# Patient Record
Sex: MALE | Race: AMERICAN INDIAN OR ALASKA NATIVE | NOT HISPANIC OR LATINO
[De-identification: names, ages, dates, MRNs, and addresses within clinical notes are randomized per-mention and may not be internally consistent; named-entity substitution may affect disease eponyms.]

---

## 2019-11-07 ENCOUNTER — TRANSCRIPTION ENCOUNTER (OUTPATIENT)
Age: 24
End: 2019-11-07

## 2020-01-07 ENCOUNTER — TRANSCRIPTION ENCOUNTER (OUTPATIENT)
Age: 25
End: 2020-01-07

## 2020-04-26 ENCOUNTER — MESSAGE (OUTPATIENT)
Age: 25
End: 2020-04-26

## 2020-05-03 ENCOUNTER — APPOINTMENT (OUTPATIENT)
Dept: DISASTER EMERGENCY | Facility: HOSPITAL | Age: 25
End: 2020-05-03

## 2020-05-04 LAB
SARS-COV-2 IGG SERPL IA-ACNC: 0.2 INDEX
SARS-COV-2 IGG SERPL QL IA: NEGATIVE

## 2020-08-24 ENCOUNTER — TRANSCRIPTION ENCOUNTER (OUTPATIENT)
Age: 25
End: 2020-08-24

## 2020-12-01 ENCOUNTER — TRANSCRIPTION ENCOUNTER (OUTPATIENT)
Age: 25
End: 2020-12-01

## 2021-07-09 ENCOUNTER — EMERGENCY (EMERGENCY)
Facility: HOSPITAL | Age: 26
LOS: 0 days | Discharge: HOME | End: 2021-07-09
Attending: EMERGENCY MEDICINE | Admitting: EMERGENCY MEDICINE
Payer: COMMERCIAL

## 2021-07-09 VITALS
WEIGHT: 186.07 LBS | HEART RATE: 82 BPM | DIASTOLIC BLOOD PRESSURE: 79 MMHG | SYSTOLIC BLOOD PRESSURE: 148 MMHG | HEIGHT: 71 IN | RESPIRATION RATE: 18 BRPM | TEMPERATURE: 99 F | OXYGEN SATURATION: 99 %

## 2021-07-09 DIAGNOSIS — R68.83 CHILLS (WITHOUT FEVER): ICD-10-CM

## 2021-07-09 DIAGNOSIS — R11.2 NAUSEA WITH VOMITING, UNSPECIFIED: ICD-10-CM

## 2021-07-09 DIAGNOSIS — R10.9 UNSPECIFIED ABDOMINAL PAIN: ICD-10-CM

## 2021-07-09 LAB
ALBUMIN SERPL ELPH-MCNC: 4.8 G/DL — SIGNIFICANT CHANGE UP (ref 3.5–5.2)
ALP SERPL-CCNC: 110 U/L — SIGNIFICANT CHANGE UP (ref 30–115)
ALT FLD-CCNC: 16 U/L — SIGNIFICANT CHANGE UP (ref 0–41)
ANION GAP SERPL CALC-SCNC: 11 MMOL/L — SIGNIFICANT CHANGE UP (ref 7–14)
APPEARANCE UR: CLEAR — SIGNIFICANT CHANGE UP
AST SERPL-CCNC: 23 U/L — SIGNIFICANT CHANGE UP (ref 0–41)
BASOPHILS # BLD AUTO: 0.06 K/UL — SIGNIFICANT CHANGE UP (ref 0–0.2)
BASOPHILS NFR BLD AUTO: 0.6 % — SIGNIFICANT CHANGE UP (ref 0–1)
BILIRUB SERPL-MCNC: 0.2 MG/DL — SIGNIFICANT CHANGE UP (ref 0.2–1.2)
BILIRUB UR-MCNC: NEGATIVE — SIGNIFICANT CHANGE UP
BUN SERPL-MCNC: 18 MG/DL — SIGNIFICANT CHANGE UP (ref 10–20)
CALCIUM SERPL-MCNC: 9.3 MG/DL — SIGNIFICANT CHANGE UP (ref 8.5–10.1)
CHLORIDE SERPL-SCNC: 102 MMOL/L — SIGNIFICANT CHANGE UP (ref 98–110)
CO2 SERPL-SCNC: 28 MMOL/L — SIGNIFICANT CHANGE UP (ref 17–32)
COLOR SPEC: COLORLESS — SIGNIFICANT CHANGE UP
CREAT SERPL-MCNC: 1.1 MG/DL — SIGNIFICANT CHANGE UP (ref 0.7–1.5)
DIFF PNL FLD: NEGATIVE — SIGNIFICANT CHANGE UP
EOSINOPHIL # BLD AUTO: 0.2 K/UL — SIGNIFICANT CHANGE UP (ref 0–0.7)
EOSINOPHIL NFR BLD AUTO: 1.9 % — SIGNIFICANT CHANGE UP (ref 0–8)
GLUCOSE SERPL-MCNC: 93 MG/DL — SIGNIFICANT CHANGE UP (ref 70–99)
GLUCOSE UR QL: NEGATIVE — SIGNIFICANT CHANGE UP
HCT VFR BLD CALC: 45.7 % — SIGNIFICANT CHANGE UP (ref 42–52)
HGB BLD-MCNC: 14.8 G/DL — SIGNIFICANT CHANGE UP (ref 14–18)
IMM GRANULOCYTES NFR BLD AUTO: 0.3 % — SIGNIFICANT CHANGE UP (ref 0.1–0.3)
KETONES UR-MCNC: NEGATIVE — SIGNIFICANT CHANGE UP
LACTATE SERPL-SCNC: 1.4 MMOL/L — SIGNIFICANT CHANGE UP (ref 0.7–2)
LEUKOCYTE ESTERASE UR-ACNC: NEGATIVE — SIGNIFICANT CHANGE UP
LIDOCAIN IGE QN: 39 U/L — SIGNIFICANT CHANGE UP (ref 7–60)
LYMPHOCYTES # BLD AUTO: 2.46 K/UL — SIGNIFICANT CHANGE UP (ref 1.2–3.4)
LYMPHOCYTES # BLD AUTO: 23.6 % — SIGNIFICANT CHANGE UP (ref 20.5–51.1)
MCHC RBC-ENTMCNC: 28.2 PG — SIGNIFICANT CHANGE UP (ref 27–31)
MCHC RBC-ENTMCNC: 32.4 G/DL — SIGNIFICANT CHANGE UP (ref 32–37)
MCV RBC AUTO: 87 FL — SIGNIFICANT CHANGE UP (ref 80–94)
MONOCYTES # BLD AUTO: 0.86 K/UL — HIGH (ref 0.1–0.6)
MONOCYTES NFR BLD AUTO: 8.2 % — SIGNIFICANT CHANGE UP (ref 1.7–9.3)
NEUTROPHILS # BLD AUTO: 6.83 K/UL — HIGH (ref 1.4–6.5)
NEUTROPHILS NFR BLD AUTO: 65.4 % — SIGNIFICANT CHANGE UP (ref 42.2–75.2)
NITRITE UR-MCNC: NEGATIVE — SIGNIFICANT CHANGE UP
NRBC # BLD: 0 /100 WBCS — SIGNIFICANT CHANGE UP (ref 0–0)
PH UR: 6.5 — SIGNIFICANT CHANGE UP (ref 5–8)
PLATELET # BLD AUTO: 248 K/UL — SIGNIFICANT CHANGE UP (ref 130–400)
POTASSIUM SERPL-MCNC: 4.4 MMOL/L — SIGNIFICANT CHANGE UP (ref 3.5–5)
POTASSIUM SERPL-SCNC: 4.4 MMOL/L — SIGNIFICANT CHANGE UP (ref 3.5–5)
PROT SERPL-MCNC: 7 G/DL — SIGNIFICANT CHANGE UP (ref 6–8)
PROT UR-MCNC: NEGATIVE — SIGNIFICANT CHANGE UP
RBC # BLD: 5.25 M/UL — SIGNIFICANT CHANGE UP (ref 4.7–6.1)
RBC # FLD: 12.7 % — SIGNIFICANT CHANGE UP (ref 11.5–14.5)
SODIUM SERPL-SCNC: 141 MMOL/L — SIGNIFICANT CHANGE UP (ref 135–146)
SP GR SPEC: 1 — LOW (ref 1.01–1.03)
UROBILINOGEN FLD QL: SIGNIFICANT CHANGE UP
WBC # BLD: 10.44 K/UL — SIGNIFICANT CHANGE UP (ref 4.8–10.8)
WBC # FLD AUTO: 10.44 K/UL — SIGNIFICANT CHANGE UP (ref 4.8–10.8)

## 2021-07-09 PROCEDURE — 99285 EMERGENCY DEPT VISIT HI MDM: CPT

## 2021-07-09 PROCEDURE — 74177 CT ABD & PELVIS W/CONTRAST: CPT | Mod: 26,MA

## 2021-07-09 RX ORDER — SODIUM CHLORIDE 9 MG/ML
2600 INJECTION, SOLUTION INTRAVENOUS ONCE
Refills: 0 | Status: COMPLETED | OUTPATIENT
Start: 2021-07-09 | End: 2021-07-09

## 2021-07-09 RX ADMIN — SODIUM CHLORIDE 2600 MILLILITER(S): 9 INJECTION, SOLUTION INTRAVENOUS at 03:37

## 2021-07-09 NOTE — ED PROVIDER NOTE - CARE PROVIDER_API CALL
Kamille Breen)  Urology  [address] [place] [address] [address] [place] [id number]  Phone: [phone]  Fax: [phone]  Established Patient  Follow Up Time: 1-3 Days

## 2021-07-09 NOTE — ED PROVIDER NOTE - PROGRESS NOTE DETAILS
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

## 2021-07-09 NOTE — ED PROVIDER NOTE - OBJECTIVE STATEMENT
25 yold male to Ed with no signif medical hx presents to Ed with Left flank pain described as dull constant started initially 1 week ago worse x 3 days; tonight pt with n/v x2, chills/cold sweats; pt denies urinary sx - freq, urgency, dysuria, stranguria, hematuria; pt denies prior hx of kidney stones but father had uric acid stones started in 20s; pt admits to using creatine for workouts;

## 2021-07-09 NOTE — ED PROVIDER NOTE - PATIENT PORTAL LINK FT
You can access the FollowMyHealth Patient Portal offered by [facility] by registering at the following website: [email or web address]. By joining [employer]’s FollowMyHealth portal, you will also be able to view your health information using other applications (apps) compatible with our system.

## 2021-07-09 NOTE — ED ADULT NURSE NOTE - NSIMPLEMENTINTERV_GEN_ALL_ED
Implemented All Universal Safety Interventions:  [place] to call system. Call bell, personal items and telephone within reach. Instruct patient to call for assistance. Room bathroom lighting operational. Non-slip footwear when patient is off stretcher. Physically safe environment: no spills, clutter or unnecessary equipment. Stretcher in lowest position, wheels locked, appropriate side rails in place.

## 2021-07-10 LAB
CULTURE RESULTS: NO GROWTH — SIGNIFICANT CHANGE UP
SPECIMEN SOURCE: SIGNIFICANT CHANGE UP

## 2021-07-27 ENCOUNTER — APPOINTMENT (OUTPATIENT)
Dept: HEMATOLOGY ONCOLOGY | Facility: CLINIC | Age: 26
End: 2021-07-27
Payer: COMMERCIAL

## 2021-07-27 ENCOUNTER — RESULT REVIEW (OUTPATIENT)
Age: 26
End: 2021-07-27

## 2021-07-27 ENCOUNTER — APPOINTMENT (OUTPATIENT)
Dept: PULMONOLOGY | Facility: CLINIC | Age: 26
End: 2021-07-27
Payer: COMMERCIAL

## 2021-07-27 VITALS
OXYGEN SATURATION: 99 % | HEART RATE: 84 BPM | BODY MASS INDEX: 27.86 KG/M2 | RESPIRATION RATE: 18 BRPM | TEMPERATURE: 98.7 F | HEIGHT: 70 IN | WEIGHT: 194.6 LBS | DIASTOLIC BLOOD PRESSURE: 80 MMHG | SYSTOLIC BLOOD PRESSURE: 124 MMHG

## 2021-07-27 DIAGNOSIS — Z00.00 ENCOUNTER FOR GENERAL ADULT MEDICAL EXAMINATION W/OUT ABNORMAL FINDINGS: ICD-10-CM

## 2021-07-27 DIAGNOSIS — R93.5 ABNORMAL FINDINGS ON DIAGNOSTIC IMAGING OF OTHER ABDOMINAL REGIONS, INCLUDING RETROPERITONEUM: ICD-10-CM

## 2021-07-27 DIAGNOSIS — Z72.89 OTHER PROBLEMS RELATED TO LIFESTYLE: ICD-10-CM

## 2021-07-27 PROCEDURE — 94060 EVALUATION OF WHEEZING: CPT

## 2021-07-27 PROCEDURE — 99072 ADDL SUPL MATRL&STAF TM PHE: CPT

## 2021-07-27 PROCEDURE — 99205 OFFICE O/P NEW HI 60 MIN: CPT

## 2021-07-27 PROCEDURE — 94727 GAS DIL/WSHOT DETER LNG VOL: CPT

## 2021-07-27 PROCEDURE — 94729 DIFFUSING CAPACITY: CPT

## 2021-07-27 NOTE — HISTORY OF PRESENT ILLNESS
[de-identified] : Mr. 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 Borrego), no adjuvant therapy given. He was non-compliant with surveillance. He presents to clinic today with large periaortic lymphadenopathy concerning for seminoma recurrent seminoma (3 years out). Here  today to discuss about further management. Reportedly BHCG, AFP and LDH nl [place] [facility] [place] \par \par ONC Hx:\par Mr. Noyola was diagnosed with Stage IB (pT2pNx) Seminoma in 2018 and underwent Left inguinal radical orchiectomy with Dr. Borrego on 7/6/2018 at [facility]. He presented to the ED at [facility] on 7/9/21 with Left flank pain x 1 week associated with nausea and vomiting. He had CT AP performed on 7/9/21, results showing two enlarged left periaortic soft tissue lymph nodes measuring up to 4.1 x 2.9 x 2.8 cm surrounding the proximal ROSSANA. Findings concerning for metastatic lymphadenopathy.\par \par Had cryptorchid testes, never a smoking. \par \par 6/28/2018 MRI Visceral Pelvis\par Enlargement and replacement of the left testis by an enhancing mass 5.8x4.0x3.2cm, consistent with a testicular malignancy.  \par \par 7/3/2018 CT AP at [facility]\par 1.Splenic hypodensity. Etiology uncertain. \par 2.There are 2 midly enlarged upper mesenteric lymph nodes. Significance uncertain.\par 3.Evidence of chronic pyelonephritis left kidney. Right renal cyst.\par 4.Enlarged and midly fatty liver\par 5.No significant adenopathy in the para-aortic region particularly in the region of the renal lolita. No inguinla lymph nodes\par \par 7/6/2018 Left inguinal radical orchiectomy. meidum size Coloplast 16ml testicular implant placement and plastic closure of inguinial incision with Tenzin Escobar at [facility]\par Pathology report\par -Left testicle and spermatic cord: Seminoma, 6.5cm\par -Specimen laterality : Left\par -Tumor focality:Multifocal\par -Tumor size\par Greatest dimension of main tumor mass : [id number]\par Greatest dimensions of additional tumor nodules: 1.1cm & 1cm\par -Intratubular Germ Cell Neoplasia : Intratubular seminoma \par -Seminoma :Seminoma \par -Timor extension\par Tumor invades rete testis\par Tumor invades into tunica albuginea very close to tunica vaginalis, involvement of vaginalis cannot be excluded\par -Spermatic cord margin : Uninvolved by tumor\par -Lymphovascular invasion : Present\par -Regional Lymph nodes : no lymph nodes submitted or found\par -Pathologic stage : pT2pNx\par \par 7/9/21 CT AP at [facility]\par Two enlarged left periaortic soft tissue lymph nodes measuring up to 4.1 x 2.9 x 2.8 cm surrounding the proximal ROSSANA. Findings concerning for metastatic lymphadenopathy.\par Nonspecific 1.6 cm splenic hypodensity.\par Left renal mid pole 3.5 cm cyst contains enhancing septations. Consider further evaluation with MRI as clinically warranted. [de-identified] : No nausea/vomiting, flank pain, nausea/vomiting, hematuria., fever, chills, night sweats, shortness of breath, cough, headache/dizziness/vision changes. Had been actively losing weight unintentionally; 21 lbs (205 --> 186 lb).

## 2021-07-27 NOTE — ASSESSMENT
[FreeTextEntry1] : Mr. 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 Borrego), no adjuvant therapy given. He was non-compliant with surveillance. He presents to clinic today with large periaortic lymphadenopathy concerning for seminoma recurrent seminoma (3 years out). Here  today to discuss about further management. Reportedly BHCG, AFP and LDH nl (obtain reports from [facility] [place]). \par \par 6/28/2018 MRI Visceral Pelvis\par Enlargement and replacement of the left testis by an enhancing mass 5.8x4.0x3.2cm, consistent with a testicular malignancy.  \par \par 7/3/2018 CT AP at [facility]\par 1.Splenic hypodensity. Etiology uncertain. \par 2.There are 2 midly enlarged upper mesenteric lymph nodes. Significance uncertain.\par 3.Evidence of chronic pyelonephritis left kidney. Right renal cyst.\par 4.Enlarged and midly fatty liver\par 5.No significant adenopathy in the para-aortic region particularly in the region of the renal lolita. No inguinla lymph nodes\par \par 7/6/2018 Left inguinal radical orchiectomy. meidum size Coloplast 16ml testicular implant placement and plastic closure of inguinal incision with Tenzin Escobar at [facility]\par Pathology report\par -Left testicle and spermatic cord: Seminoma, 6.5cm\par -Specimen laterality : Left\par -Tumor focality:Multifocal\par -Tumor size\par Greatest dimension of main tumor mass : 6.0h6e1js\par Greatest dimensions of additional tumor nodules: 1.1cm & 1cm\par -Intratubular Germ Cell Neoplasia : Intratubular seminoma \par -Seminoma :Seminoma \par -Timor extension\par Tumor invades rete testis\par Tumor invades into tunica albuginea very close to tunica vaginalis, involvement of vaginalis cannot be excluded\par -Spermatic cord margin : Uninvolved by tumor\par -Lymphovascular invasion : Present\par -Regional Lymph nodes : no lymph nodes submitted or found\par -Pathologic stage : pT2pNx\par \par 7/9/21 CT AP at [facility]\par Two enlarged left periaortic soft tissue lymph nodes measuring up to 4.1 x 2.9 x 2.8 cm surrounding the proximal ROSSANA. Findings concerning for metastatic lymphadenopathy.\par Nonspecific 1.6 cm splenic hypodensity.\par Left renal mid pole 3.5 cm cyst contains enhancing septations. Consider further evaluation with MRI as clinically warranted.\par \par #) Suspected recurrent seminoma/retroperitoneal LN/abnl scans\par - Was diagnosed in 07/2018, no adjuvant therapy given, was not compliant with active surveillance. \par - CT A/P (07/09): Two enlarged left periaortic lymph nodes. Larger measures 4.1 x 2.9 x 2.8 cm. Pattern of spread is very consistent with recurrent seminoma. \par - Reportedly LDH, AFP, and Beta-HCG results from [facility] (07/19) were WNL. ctDNA and FoundationOne Liquid Bx to be done today. With repeat LDH, AFP and B-HCG. \par - CT Chest with contrast, PET scan \par - PFT testing in preparation for bleomycin therapy. \par - Will need to go to sperm bank for fertility preservation \par -Patient to go to [facility] [place] for a second opinion due to rarity of disease and toxicity of chemotherapy treatment\par -will risk stratify once w/u complete\par - COVID vaccine completed x 2 (Pfizer), will send COVID antibody \par - Had Urology appointment who said he was not a surgical candidate (Dr. Schoenfeld from [facility])\par \par Follow-up in 2 weeks\par

## 2021-07-28 LAB
AFP-TM SERPL-MCNC: <1.8 NG/ML
ALBUMIN SERPL ELPH-MCNC: 4.8 G/DL
ALP BLD-CCNC: 103 U/L
ALT SERPL-CCNC: 14 U/L
ANION GAP SERPL CALC-SCNC: 14 MMOL/L
APTT BLD: 34.1 SEC
AST SERPL-CCNC: 22 U/L
BASOPHILS # BLD AUTO: 0.04 K/UL
BASOPHILS NFR BLD AUTO: 0.5 %
BILIRUB SERPL-MCNC: 0.3 MG/DL
BUN SERPL-MCNC: 14 MG/DL
CALCIUM SERPL-MCNC: 9.9 MG/DL
CHLORIDE SERPL-SCNC: 101 MMOL/L
CO2 SERPL-SCNC: 25 MMOL/L
COVID-19 SPIKE DOMAIN ANTIBODY INTERPRETATION: POSITIVE
CREAT SERPL-MCNC: 1.02 MG/DL
EOSINOPHIL # BLD AUTO: 0.08 K/UL
EOSINOPHIL NFR BLD AUTO: 1.1 %
GLUCOSE SERPL-MCNC: 67 MG/DL
HCG SERPL-MCNC: 1 MIU/ML
HCT VFR BLD CALC: 49 %
HGB BLD-MCNC: 16.1 G/DL
IMM GRANULOCYTES NFR BLD AUTO: 0.4 %
INR PPP: 0.97 RATIO
LDH SERPL-CCNC: 327 U/L
LYMPHOCYTES # BLD AUTO: 1.39 K/UL
LYMPHOCYTES NFR BLD AUTO: 18.8 %
MAN DIFF?: NORMAL
MCHC RBC-ENTMCNC: 29.2 PG
MCHC RBC-ENTMCNC: 32.9 GM/DL
MCV RBC AUTO: 88.9 FL
MONOCYTES # BLD AUTO: 0.65 K/UL
MONOCYTES NFR BLD AUTO: 8.8 %
NEUTROPHILS # BLD AUTO: 5.22 K/UL
NEUTROPHILS NFR BLD AUTO: 70.4 %
PLATELET # BLD AUTO: 240 K/UL
POTASSIUM SERPL-SCNC: 4.4 MMOL/L
PROT SERPL-MCNC: 7.8 G/DL
PT BLD: 11.5 SEC
RBC # BLD: 5.51 M/UL
RBC # FLD: 13 %
SARS-COV-2 AB SERPL IA-ACNC: >250 U/ML
SODIUM SERPL-SCNC: 139 MMOL/L
WBC # FLD AUTO: 7.41 K/UL

## 2021-07-30 ENCOUNTER — RESULT REVIEW (OUTPATIENT)
Age: 26
End: 2021-07-30

## 2021-07-30 ENCOUNTER — OUTPATIENT (OUTPATIENT)
Dept: OUTPATIENT SERVICES | Facility: HOSPITAL | Age: 26
LOS: 1 days | End: 2021-07-30
Payer: COMMERCIAL

## 2021-07-30 ENCOUNTER — TRANSCRIPTION ENCOUNTER (OUTPATIENT)
Age: 26
End: 2021-07-30

## 2021-07-30 ENCOUNTER — APPOINTMENT (OUTPATIENT)
Dept: CT IMAGING | Facility: HOSPITAL | Age: 26
End: 2021-07-30
Payer: COMMERCIAL

## 2021-07-30 LAB
GLUCOSE BLDC GLUCOMTR-MCNC: 94 MG/DL — SIGNIFICANT CHANGE UP (ref 70–99)
TESTOST BND SERPL-MCNC: 19 PG/ML
TESTOSTERONE TOTAL S: 615 NG/DL

## 2021-07-30 PROCEDURE — 71260 CT THORAX DX C+: CPT

## 2021-07-30 PROCEDURE — 78815 PET IMAGE W/CT SKULL-THIGH: CPT

## 2021-07-30 PROCEDURE — 71260 CT THORAX DX C+: CPT | Mod: 26

## 2021-07-30 PROCEDURE — 78815 PET IMAGE W/CT SKULL-THIGH: CPT | Mod: 26

## 2021-07-30 PROCEDURE — A9552: CPT

## 2021-07-30 PROCEDURE — 82962 GLUCOSE BLOOD TEST: CPT

## 2021-08-06 ENCOUNTER — APPOINTMENT (OUTPATIENT)
Dept: HEMATOLOGY ONCOLOGY | Facility: CLINIC | Age: 26
End: 2021-08-06

## 2021-08-07 ENCOUNTER — LABORATORY RESULT (OUTPATIENT)
Age: 26
End: 2021-08-07

## 2021-08-10 ENCOUNTER — APPOINTMENT (OUTPATIENT)
Dept: HEMATOLOGY ONCOLOGY | Facility: CLINIC | Age: 26
End: 2021-08-10
Payer: COMMERCIAL

## 2021-08-10 VITALS
BODY MASS INDEX: 27.77 KG/M2 | SYSTOLIC BLOOD PRESSURE: 115 MMHG | RESPIRATION RATE: 18 BRPM | DIASTOLIC BLOOD PRESSURE: 62 MMHG | HEIGHT: 70 IN | OXYGEN SATURATION: 98 % | WEIGHT: 194 LBS | TEMPERATURE: 98.7 F | HEART RATE: 70 BPM

## 2021-08-10 PROCEDURE — 99214 OFFICE O/P EST MOD 30 MIN: CPT

## 2021-08-11 ENCOUNTER — OUTPATIENT (OUTPATIENT)
Dept: OUTPATIENT SERVICES | Facility: HOSPITAL | Age: 26
LOS: 1 days | Discharge: HOME | End: 2021-08-11

## 2021-08-11 DIAGNOSIS — C62.90 MALIGNANT NEOPLASM OF UNSPECIFIED TESTIS, UNSPECIFIED WHETHER DESCENDED OR UNDESCENDED: ICD-10-CM

## 2021-08-11 NOTE — ASSESSMENT
[FreeTextEntry1] : Mr. 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 Borrego), no adjuvant therapy given. He was non-compliant with surveillance. He presents to clinic today with large periaortic lymphadenopathy concerning for seminoma recurrent seminoma (3 years out). Here  today to discuss about further management. \par \par 6/28/2018 MRI Visceral Pelvis\par Enlargement and replacement of the left testis by an enhancing mass 5.8x4.0x3.2cm, consistent with a testicular malignancy.  \par \par 7/3/2018 CT AP at [facility]\par 1.Splenic hypodensity. Etiology uncertain. \par 2.There are 2 midly enlarged upper mesenteric lymph nodes. Significance uncertain.\par 3.Evidence of chronic pyelonephritis left kidney. Right renal cyst.\par 4.Enlarged and midly fatty liver\par 5.No significant adenopathy in the para-aortic region particularly in the region of the renal lolita. No inguinla lymph nodes\par \par 7/6/2018 Left inguinal radical orchiectomy. meidum size Coloplast 16ml testicular implant placement and plastic closure of inguinal incision with Tenzin Escobar at [facility]\par Pathology report\par -Left testicle and spermatic cord: Seminoma, 6.5cm\par -Specimen laterality : Left\par -Tumor focality:Multifocal\par -Tumor size\par Greatest dimension of main tumor mass : 6.3o2p6db\par Greatest dimensions of additional tumor nodules: 1.1cm & 1cm\par -Intratubular Germ Cell Neoplasia : Intratubular seminoma \par -Seminoma :Seminoma \par -Timor extension\par Tumor invades rete testis\par Tumor invades into tunica albuginea very close to tunica vaginalis, involvement of vaginalis cannot be excluded\par -Spermatic cord margin : Uninvolved by tumor\par -Lymphovascular invasion : Present\par -Regional Lymph nodes : no lymph nodes submitted or found\par -Pathologic stage : pT2pNx\par \par 7/9/21 CT AP at [facility]\par Two enlarged left periaortic soft tissue lymph nodes measuring up to 4.1 x 2.9 x 2.8 cm surrounding the proximal ROSSANA. Findings concerning for metastatic lymphadenopathy.\par Nonspecific 1.6 cm splenic hypodensity.\par Left renal mid pole 3.5 cm cyst contains enhancing septations. Consider further evaluation with MRI as clinically warranted.\par \par 7/30/2021 CT CHEST:\par 1.  No thoracic adenopathy or metastases.\par 2.  Unchanged splenic hypodensity, not FDG avid on concurrent PET/CT, indeterminate.\par 3.  Incidental probable few left renal calyceal diverticuli. Urogram could be helpful for confirmation.\par \par 7/30/2021 PET/CT:\par - Two enlarged FDG avid para-aortic lymph nodes are suspicious for shane metastasis.\par - No additional sites of pathologic FDG uptake to suggest biologic tumor activity.\par \par #) Suspected recurrent seminoma/retroperitoneal LN/abnl scans\par - Was diagnosed in 07/2018, no adjuvant therapy given, was not compliant with active surveillance. \par - CT A/P (07/09/21): Two enlarged left periaortic lymph nodes. Larger measures 4.1 x 2.9 x 2.8 cm. Pattern of spread is very consistent with recurrent seminoma. These LNs are PET-avid. \par -ctDNA pending since 7/27/21 and will need to resend FoundationOne Liquid Bx d/t insufficient cell free DNA.  With LDH elevated at 372, AFP -ve, lab performed wrong HCG (will repeat HCG-TM). \par - CT Chest with contrast-no LN, PET scan as above\par - PFT testing in preparation for bleomycin therapy (done, nl 7/27/21). \par - Had consult with sperm bank for fertility preservation \par -Patient to seek a second opinion due to rarity of disease and toxicity of chemotherapy treatment\par -will risk stratify as w/u complete, good risk (no organ involvement and LDH is < 2.5 times ULN)\par - COVID vaccine completed x 2 (Pfizer)\par - Had Urology appointment who said he was not a surgical candidate (Dr. Schoenfeld from [facility] [place])\par -FMLA and disability forms to be filled\par -Will give 3 cycles of BEP for recurrent seminoma (await liq bx results)\par -Bleomycin, etoposide, and cisplatin — For men with good-risk GCTs, suggest three cycles of standard BEP (bleomycin 30 units on days 1, 8, and 15; etoposide 100 mg/m2 on days 1 to 5; cisplatin 20 mg/m2 on days 1 to 5) administered on a 21-day cycle. This regimen of BEP is associated with an excellent survival outcome with limited toxicity\par -He was provided with patient educational materials. Risks, benefits and alternatives were discussed. He was also provided with the opportunity to asks questions. \par -Requests starting after labor day, will need a portacath\par -Liq biopsy inconclusive, will schedule a CT guided bx of LN\par Follow-up 9/7/21\par

## 2021-08-11 NOTE — HISTORY OF PRESENT ILLNESS
[de-identified] : Mr. 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 Borrego), no adjuvant therapy given. He was non-compliant with surveillance. He presents to clinic today with large periaortic lymphadenopathy concerning for seminoma recurrent seminoma (3 years out). Here  today to discuss about further management.\par \par ONC Hx:\par Mr. Noyola was diagnosed with Stage IB (pT2pNx) Seminoma in 2018 and underwent Left inguinal radical orchiectomy with Dr. Borrego on 7/6/2018 at [facility]. He presented to the ED at [facility] on 7/9/21 with Left flank pain x 1 week associated with nausea and vomiting. He had CT AP performed on 7/9/21, results showing two enlarged left periaortic soft tissue lymph nodes measuring up to 4.1 x 2.9 x 2.8 cm surrounding the proximal ROSSANA. Findings concerning for metastatic lymphadenopathy.\par \par Had cryptorchid testes, never a smoking. \par \par 6/28/2018 MRI Visceral Pelvis\par Enlargement and replacement of the left testis by an enhancing mass 5.8x4.0x3.2cm, consistent with a testicular malignancy.  \par \par 7/3/2018 CT AP at [facility]\par 1.Splenic hypodensity. Etiology uncertain. \par 2.There are 2 midly enlarged upper mesenteric lymph nodes. Significance uncertain.\par 3.Evidence of chronic pyelonephritis left kidney. Right renal cyst.\par 4.Enlarged and midly fatty liver\par 5.No significant adenopathy in the para-aortic region particularly in the region of the renal lolita. No inguinla lymph nodes\par \par 7/6/2018 Left inguinal radical orchiectomy. meidum size Coloplast 16ml testicular implant placement and plastic closure of inguinial incision with Tenzin Escobar at [facility]\par Pathology report\par -Left testicle and spermatic cord: Seminoma, 6.5cm\par -Specimen laterality : Left\par -Tumor focality:Multifocal\par -Tumor size\par Greatest dimension of main tumor mass : 6.7g7u5yd\par Greatest dimensions of additional tumor nodules: 1.1cm & 1cm\par -Intratubular Germ Cell Neoplasia : Intratubular seminoma \par -Seminoma :Seminoma \par -Timor extension\par Tumor invades rete testis\par Tumor invades into tunica albuginea very close to tunica vaginalis, involvement of vaginalis cannot be excluded\par -Spermatic cord margin : Uninvolved by tumor\par -Lymphovascular invasion : Present\par -Regional Lymph nodes : no lymph nodes submitted or found\par -Pathologic stage : pT2pNx\par \par 7/9/21 CT AP at [facility]\par Two enlarged left periaortic soft tissue lymph nodes measuring up to 4.1 x 2.9 x 2.8 cm surrounding the proximal ROSSANA. Findings concerning for metastatic lymphadenopathy.\par Nonspecific 1.6 cm splenic hypodensity.\par Left renal mid pole 3.5 cm cyst contains enhancing septations. Consider further evaluation with MRI as clinically warranted.\par \par 7/30/2021 CT CHEST:\par 1.  No thoracic adenopathy or metastases.\par 2.  Unchanged splenic hypodensity, not FDG avid on concurrent PET/CT, indeterminate.\par 3.  Incidental probable few left renal calyceal diverticuli. Urogram could be helpful for confirmation.\par \par 7/30/2021 PET/CT:\par - Two enlarged FDG avid para-aortic lymph nodes are suspicious for shane metastasis.\par - No additional sites of pathologic FDG uptake to suggest biologic tumor activity.\par  [de-identified] : No nausea/vomiting, flank pain, nausea/vomiting, hematuria., fever, chills, night sweats, shortness of breath, cough, headache/dizziness/vision changes. Had been actively losing weight unintentionally; 21 lbs (205 --> 186 lb).

## 2021-08-12 ENCOUNTER — APPOINTMENT (OUTPATIENT)
Dept: HEMATOLOGY ONCOLOGY | Facility: CLINIC | Age: 26
End: 2021-08-12
Payer: COMMERCIAL

## 2021-08-12 DIAGNOSIS — Z80.8 FAMILY HISTORY OF MALIGNANT NEOPLASM OF OTHER ORGANS OR SYSTEMS: ICD-10-CM

## 2021-08-12 PROCEDURE — 99215 OFFICE O/P EST HI 40 MIN: CPT | Mod: 95

## 2021-08-12 NOTE — HISTORY OF PRESENT ILLNESS
[de-identified] : Mr. 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 Borrego), no adjuvant therapy given. He was non-compliant with surveillance. He presents to clinic today with large periaortic lymphadenopathy concerning for seminoma recurrent seminoma (3 years out). Here  today to discuss about further management.\par \par ONC Hx:\par Mr. Noyola was diagnosed with Stage IB (pT2pNx) Seminoma in 2018 and underwent Left inguinal radical orchiectomy with Dr. Borrego on 7/6/2018 at [facility]. He presented to the ED at [facility] on 7/9/21 with Left flank pain x 1 week associated with nausea and vomiting. He had CT AP performed on 7/9/21, results showing two enlarged left periaortic soft tissue lymph nodes measuring up to 4.1 x 2.9 x 2.8 cm surrounding the proximal ROSSANA. 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 \par \par 6/28/2018 MRI Visceral Pelvis\par Enlargement and replacement of the left testis by an enhancing mass 5.8x4.0x3.2cm, consistent with a testicular malignancy.  \par \par 7/3/2018 CT AP at [facility]\par 1.Splenic hypodensity. Etiology uncertain. \par 2.There are 2 midly enlarged upper mesenteric lymph nodes. Significance uncertain.\par 3.Evidence of chronic pyelonephritis left kidney. Right renal cyst.\par 4.Enlarged and midly fatty liver\par 5.No significant adenopathy in the para-aortic region particularly in the region of the renal lolita. No inguinla lymph nodes\par \par 7/6/2018 Left inguinal radical orchiectomy. meidum size Coloplast 16ml testicular implant placement and plastic closure of inguinial incision with Tenzin Escobar at [facility]\par Pathology report\par -Left testicle and spermatic cord: Seminoma, 6.5cm\par -Specimen laterality : Left\par -Tumor focality:Multifocal\par -Tumor size\par Greatest dimension of main tumor mass : 6.0u3j8uy\par Greatest dimensions of additional tumor nodules: 1.1cm & 1cm\par -Intratubular Germ Cell Neoplasia : Intratubular seminoma \par -Seminoma :Seminoma \par -Timor extension\par Tumor invades rete testis\par Tumor invades into tunica albuginea very close to tunica vaginalis, involvement of vaginalis cannot be excluded\par -Spermatic cord margin : Uninvolved by tumor\par -Lymphovascular invasion : Present\par -Regional Lymph nodes : no lymph nodes submitted or found\par -Pathologic stage : pT2pNx\par \par 7/9/21 CT AP at [facility]\par Two enlarged left periaortic soft tissue lymph nodes measuring up to 4.1 x 2.9 x 2.8 cm surrounding the proximal ROSSANA. Findings concerning for metastatic lymphadenopathy.\par Nonspecific 1.6 cm splenic hypodensity.\par Left renal mid pole 3.5 cm cyst contains enhancing septations. Consider further evaluation with MRI as clinically warranted.\par \par 7/30/2021 CT CHEST:\par 1.  No thoracic adenopathy or metastases.\par 2.  Unchanged splenic hypodensity, not FDG avid on concurrent PET/CT, indeterminate.\par 3.  Incidental probable few left renal calyceal diverticuli. Urogram could be helpful for confirmation.\par \par 7/30/2021 PET/CT:\par - Two enlarged FDG avid para-aortic lymph nodes are suspicious for shane metastasis.\par - No additional sites of pathologic FDG uptake to suggest biologic tumor activity.\par  [de-identified] : Had an episode of food poisoning and went to the ER, had a CT scan, revealed RP LAD. No back pains, no cough no FELIX, no fatigue. Libido is normal, Erections are full. APpetite is good, had some intentional weight loss since February, dropped 25 pounds. Gained about 10 pounds back recently out of anxiety. Goes to the gym daily. 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 \par \par

## 2021-08-12 NOTE — REASON FOR VISIT
[Initial Consultation] : an initial consultation [Home] : at home, [unfilled] , at the time of the visit. [Medical Office: ([facility])___] : at the medical office located in  [Verbal consent obtained from patient] : the patient, [unfilled] [FreeTextEntry2] : recurrent seminoma

## 2021-08-12 NOTE — REVIEW OF SYSTEMS
[Negative] : Genitourinary [Abdominal Pain] : no abdominal pain [Vomiting] : no vomiting [Constipation] : no constipation [Diarrhea] : no diarrhea [Joint Pain] : no joint pain [Joint Stiffness] : no joint stiffness [Muscle Pain] : no muscle pain [Skin Rash] : no skin rash [Dizziness] : no dizziness [Anxiety] : no anxiety [Depression] : no depression [Muscle Weakness] : no muscle weakness [Easy Bleeding] : no tendency for easy bleeding [Easy Bruising] : no tendency for easy bruising [FreeTextEntry9] : no cramps [de-identified] : No HA,no paresthesias

## 2021-08-12 NOTE — ASSESSMENT
[Palliative Care Plan] : not applicable at this time [FreeTextEntry1] : Hanna Noyola is seen via telehealth services today as a second opinion consultation.  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  He says that he had his initial CT scan and there was nothing present.  He then admits to not following up.  Ironically, he works as a urology PA.  He had an episode of apparent food poisoning and he went to the emergency room.  A CT scan was performed and this revealed the presence of periaortic adenopathy consistent with recurrence of his seminoma.  He was having some left flank pain at that time.  He denies any back pain or flank pain at this time.  He has a history of a cryptorchid testicle which was not operated upon.\par \par His appetite has been good.  He says he lost considerable weight recently, consisting of 25 pound decrease since February.  He is going to the gym on a daily basis and he was intentionally losing weight.  The stress from his recent diagnosis of recurrence has resulted in him eating a lot of food and gaining about 10 pounds back as a result of the anxiety.  He has no back pains at this time.  There is no cough or shortness of breath.  There is no fatigue.  His libido is normal and erections are full.\par \par A comprehensive history was obtained.  He appears well and in no acute distress.  Documents were reviewed.  His most recent imaging studies were reviewed as well.\par \par Dr. Castillo is in favor of performing a biopsy of the retroperitoneal adenopathy.  He was seen in consultation at [facility] by Bahman Dalal who apparently did not recommend a biopsy but suggested that he receive systemic therapy.  I spoke with Dr. Castillo shortly after his visit today, and we discussed the need for a biopsy.  I think her argument in favor of a biopsy is reasonable and the believe that he should proceed with this.  It is set up for next week.\par \par He is already started to cryopreserved sperm.  He is unmarried and has no children.  He dropped off a specimen today.\par \par We discussed chemotherapy for good risk recurrent disease.  His prognosis is excellent.  We discussed the management of stage I seminoma's.  The traditional estimated risk of recurrence is 15 to 20%.  And a large database published in 2014 in the Journal of Clinical Oncology, more than 1300 patients were reported upon, with a recurrence rate of 13%.  Of the patients are recurred, most went on to receive chemotherapy and some went on to receive radiation.  There were 7 recurrences amongst the group after treatment.  One occurred in the chemotherapy group, and 6 occurred within the radiation therapy group, outside of the radiation field.  All patients did well with the exception of one patient who  of a chemotherapy related complication.  Other patients who  in this cohort were unrelated to the underlying diagnosis.\par \par As he is a urology physicians assistant, I sent him a copy of this article by email.  He returned the email to me later telling me that he read the article and he was thankful for sharing this information that he felt somewhat more comfortable.\par \par We discussed the adverse effects that occur with BEP chemotherapy in detail as well as the logistics of treatment.  He is a good risk patient and would have the alternative of receiving 4 cycles of EP, but this gives him further exposure to etoposide and I think will be  better for him to receive the shorter course of therapy with BEP for 3 cycles.\par \par All questions were answered to the best of my ability and to his apparent satisfaction.  He will go forward with the biopsy and he will receive his treatment at [facility] under the guidance of Dr. Castillo.\par \par We also have a clinical trial for a new marker in testicular cancer, and this is micro .  This trial is sponsored by the [employer] through the [employer] [place].  This is a validation of previously presented material where this marker has approximately 90% sensitivity and specificity for cell tumor relapse versus the 40 to 50% sensitivity of the current markers utilized.  Of note, it also appears that micro  may also be predictive for patients on surveillance who may have a higher risk of recurrence and even for patients with teratoma whereas markers are ineffective in follow-up.  His LDH was elevated, but this is not an important marker in the case of seminoma.  His beta-hCG was 1, and seminomas are known to have the possibility of beta-hCG elevations.  The alpha-fetoprotein was normal.

## 2021-08-16 ENCOUNTER — NON-APPOINTMENT (OUTPATIENT)
Age: 26
End: 2021-08-16

## 2021-08-16 ENCOUNTER — APPOINTMENT (OUTPATIENT)
Dept: HEMATOLOGY ONCOLOGY | Facility: CLINIC | Age: 26
End: 2021-08-16

## 2021-08-17 ENCOUNTER — OUTPATIENT (OUTPATIENT)
Dept: OUTPATIENT SERVICES | Facility: HOSPITAL | Age: 26
LOS: 1 days | End: 2021-08-17

## 2021-08-17 LAB
ALBUMIN SERPL ELPH-MCNC: 4.6 G/DL
ALP BLD-CCNC: 90 U/L
ALT SERPL-CCNC: 14 U/L
ANION GAP SERPL CALC-SCNC: 22 MMOL/L
AST SERPL-CCNC: 24 U/L
BASOPHILS # BLD AUTO: 0.03 K/UL
BASOPHILS NFR BLD AUTO: 0.5 %
BILIRUB SERPL-MCNC: 0.4 MG/DL
BUN SERPL-MCNC: 10 MG/DL
CALCIUM SERPL-MCNC: 9.8 MG/DL
CHLORIDE SERPL-SCNC: 101 MMOL/L
CO2 SERPL-SCNC: 20 MMOL/L
CREAT SERPL-MCNC: 0.93 MG/DL
EOSINOPHIL # BLD AUTO: 0.1 K/UL
EOSINOPHIL NFR BLD AUTO: 1.8 %
GLUCOSE SERPL-MCNC: 47 MG/DL
HCG-TM SERPL-MCNC: 1 MIU/ML
HCT VFR BLD CALC: 48.4 %
HGB BLD-MCNC: 15 G/DL
IMM GRANULOCYTES NFR BLD AUTO: 0.4 %
LDH SERPL-CCNC: 297 U/L
LYMPHOCYTES # BLD AUTO: 1.11 K/UL
LYMPHOCYTES NFR BLD AUTO: 19.9 %
MAN DIFF?: NORMAL
MCHC RBC-ENTMCNC: 29 PG
MCHC RBC-ENTMCNC: 31 GM/DL
MCV RBC AUTO: 93.6 FL
MONOCYTES # BLD AUTO: 0.57 K/UL
MONOCYTES NFR BLD AUTO: 10.2 %
NEUTROPHILS # BLD AUTO: 3.74 K/UL
NEUTROPHILS NFR BLD AUTO: 67.2 %
PLATELET # BLD AUTO: 211 K/UL
POTASSIUM SERPL-SCNC: 4 MMOL/L
PROT SERPL-MCNC: 7.6 G/DL
RBC # BLD: 5.17 M/UL
RBC # FLD: 13.2 %
SODIUM SERPL-SCNC: 144 MMOL/L
WBC # FLD AUTO: 5.57 K/UL

## 2021-08-18 ENCOUNTER — RESULT REVIEW (OUTPATIENT)
Age: 26
End: 2021-08-18

## 2021-08-18 ENCOUNTER — APPOINTMENT (OUTPATIENT)
Dept: INTERVENTIONAL RADIOLOGY/VASCULAR | Facility: HOSPITAL | Age: 26
End: 2021-08-18

## 2021-08-18 ENCOUNTER — OUTPATIENT (OUTPATIENT)
Dept: OUTPATIENT SERVICES | Facility: HOSPITAL | Age: 26
LOS: 1 days | End: 2021-08-18
Payer: COMMERCIAL

## 2021-08-18 ENCOUNTER — APPOINTMENT (OUTPATIENT)
Dept: CT IMAGING | Facility: HOSPITAL | Age: 26
End: 2021-08-18

## 2021-08-18 PROCEDURE — 88305 TISSUE EXAM BY PATHOLOGIST: CPT | Mod: 26,59

## 2021-08-18 PROCEDURE — 77012 CT SCAN FOR NEEDLE BIOPSY: CPT

## 2021-08-18 PROCEDURE — 88173 CYTOPATH EVAL FNA REPORT: CPT | Mod: 26

## 2021-08-18 PROCEDURE — C1769: CPT

## 2021-08-18 PROCEDURE — 88173 CYTOPATH EVAL FNA REPORT: CPT

## 2021-08-18 PROCEDURE — 38505 NEEDLE BIOPSY LYMPH NODES: CPT

## 2021-08-18 PROCEDURE — 77012 CT SCAN FOR NEEDLE BIOPSY: CPT | Mod: 26

## 2021-08-18 PROCEDURE — 88305 TISSUE EXAM BY PATHOLOGIST: CPT

## 2021-08-19 DIAGNOSIS — C62.90 MALIGNANT NEOPLASM OF UNSPECIFIED TESTIS, UNSPECIFIED WHETHER DESCENDED OR UNDESCENDED: ICD-10-CM

## 2021-08-20 LAB — SURGICAL PATHOLOGY STUDY: SIGNIFICANT CHANGE UP

## 2021-08-23 ENCOUNTER — NON-APPOINTMENT (OUTPATIENT)
Age: 26
End: 2021-08-23

## 2021-08-27 ENCOUNTER — NON-APPOINTMENT (OUTPATIENT)
Age: 26
End: 2021-08-27

## 2021-09-02 ENCOUNTER — OUTPATIENT (OUTPATIENT)
Dept: OUTPATIENT SERVICES | Facility: HOSPITAL | Age: 26
LOS: 1 days | End: 2021-09-02
Payer: COMMERCIAL

## 2021-09-02 ENCOUNTER — APPOINTMENT (OUTPATIENT)
Dept: INTERVENTIONAL RADIOLOGY/VASCULAR | Facility: HOSPITAL | Age: 26
End: 2021-09-02

## 2021-09-02 ENCOUNTER — RESULT REVIEW (OUTPATIENT)
Age: 26
End: 2021-09-02

## 2021-09-02 PROCEDURE — 76937 US GUIDE VASCULAR ACCESS: CPT | Mod: 26

## 2021-09-02 PROCEDURE — 36561 INSERT TUNNELED CV CATH: CPT

## 2021-09-02 PROCEDURE — 99152 MOD SED SAME PHYS/QHP 5/>YRS: CPT

## 2021-09-03 RX ORDER — SODIUM CHLORIDE 9 MG/ML
1000 INJECTION INTRAMUSCULAR; INTRAVENOUS; SUBCUTANEOUS ONCE
Refills: 0 | Status: COMPLETED | OUTPATIENT
Start: 2021-09-07 | End: 2021-09-07

## 2021-09-03 RX ORDER — FOSAPREPITANT DIMEGLUMINE 150 MG/5ML
150 INJECTION, POWDER, LYOPHILIZED, FOR SOLUTION INTRAVENOUS ONCE
Refills: 0 | Status: COMPLETED | OUTPATIENT
Start: 2021-09-07 | End: 2021-09-07

## 2021-09-03 RX ORDER — SODIUM CHLORIDE 9 MG/ML
1000 INJECTION, SOLUTION INTRAVENOUS
Refills: 0 | Status: COMPLETED | OUTPATIENT
Start: 2021-09-08 | End: 2021-09-08

## 2021-09-03 RX ORDER — ONDANSETRON 8 MG/1
16 TABLET, FILM COATED ORAL ONCE
Refills: 0 | Status: COMPLETED | OUTPATIENT
Start: 2021-09-07 | End: 2021-09-07

## 2021-09-03 RX ORDER — DEXAMETHASONE 0.5 MG/5ML
10 ELIXIR ORAL ONCE
Refills: 0 | Status: COMPLETED | OUTPATIENT
Start: 2021-09-07 | End: 2021-09-07

## 2021-09-03 RX ORDER — SODIUM CHLORIDE 9 MG/ML
1000 INJECTION, SOLUTION INTRAVENOUS
Refills: 0 | Status: COMPLETED | OUTPATIENT
Start: 2021-09-13 | End: 2021-09-13

## 2021-09-03 RX ORDER — SODIUM CHLORIDE 9 MG/ML
1000 INJECTION INTRAMUSCULAR; INTRAVENOUS; SUBCUTANEOUS ONCE
Refills: 0 | Status: COMPLETED | OUTPATIENT
Start: 2021-09-08 | End: 2021-09-08

## 2021-09-03 RX ORDER — SODIUM CHLORIDE 9 MG/ML
1000 INJECTION, SOLUTION INTRAVENOUS
Refills: 0 | Status: COMPLETED | OUTPATIENT
Start: 2021-09-07 | End: 2021-09-07

## 2021-09-04 PROCEDURE — 76937 US GUIDE VASCULAR ACCESS: CPT

## 2021-09-04 PROCEDURE — 36561 INSERT TUNNELED CV CATH: CPT

## 2021-09-04 PROCEDURE — C1769: CPT

## 2021-09-04 PROCEDURE — C1788: CPT

## 2021-09-04 PROCEDURE — 99152 MOD SED SAME PHYS/QHP 5/>YRS: CPT

## 2021-09-04 PROCEDURE — 99153 MOD SED SAME PHYS/QHP EA: CPT | Mod: 59

## 2021-09-04 PROCEDURE — 36415 COLL VENOUS BLD VENIPUNCTURE: CPT

## 2021-09-04 PROCEDURE — 80053 COMPREHEN METABOLIC PANEL: CPT

## 2021-09-07 ENCOUNTER — APPOINTMENT (OUTPATIENT)
Dept: HEMATOLOGY ONCOLOGY | Facility: CLINIC | Age: 26
End: 2021-09-07
Payer: COMMERCIAL

## 2021-09-07 ENCOUNTER — APPOINTMENT (OUTPATIENT)
Age: 26
End: 2021-09-07

## 2021-09-07 ENCOUNTER — OUTPATIENT (OUTPATIENT)
Dept: OUTPATIENT SERVICES | Facility: HOSPITAL | Age: 26
LOS: 1 days | End: 2021-09-07
Payer: COMMERCIAL

## 2021-09-07 VITALS
TEMPERATURE: 98 F | DIASTOLIC BLOOD PRESSURE: 74 MMHG | OXYGEN SATURATION: 99 % | RESPIRATION RATE: 20 BRPM | HEART RATE: 78 BPM | SYSTOLIC BLOOD PRESSURE: 110 MMHG

## 2021-09-07 VITALS
RESPIRATION RATE: 18 BRPM | BODY MASS INDEX: 27.92 KG/M2 | OXYGEN SATURATION: 98 % | DIASTOLIC BLOOD PRESSURE: 75 MMHG | SYSTOLIC BLOOD PRESSURE: 118 MMHG | HEIGHT: 70 IN | HEART RATE: 76 BPM | WEIGHT: 195 LBS | TEMPERATURE: 98.2 F

## 2021-09-07 DIAGNOSIS — C62.90 MALIGNANT NEOPLASM OF UNSPECIFIED TESTIS, UNSPECIFIED WHETHER DESCENDED OR UNDESCENDED: ICD-10-CM

## 2021-09-07 PROCEDURE — 99214 OFFICE O/P EST MOD 30 MIN: CPT

## 2021-09-07 RX ORDER — ETOPOSIDE 20 MG/ML
200 VIAL (ML) INTRAVENOUS ONCE
Refills: 0 | Status: COMPLETED | OUTPATIENT
Start: 2021-09-07 | End: 2021-09-07

## 2021-09-07 RX ORDER — CISPLATIN 1 MG/ML
40 INJECTION, SOLUTION INTRAVENOUS ONCE
Refills: 0 | Status: COMPLETED | OUTPATIENT
Start: 2021-09-07 | End: 2021-09-07

## 2021-09-07 RX ORDER — BLEOMYCIN SULFATE 30 UNIT
30 VIAL (EA) INJECTION ONCE
Refills: 0 | Status: COMPLETED | OUTPATIENT
Start: 2021-09-07 | End: 2021-09-07

## 2021-09-07 RX ADMIN — Medication 30 UNIT(S): at 13:40

## 2021-09-07 RX ADMIN — CISPLATIN 40 MILLIGRAM(S): 1 INJECTION, SOLUTION INTRAVENOUS at 15:40

## 2021-09-07 RX ADMIN — SODIUM CHLORIDE 1000 MILLILITER(S): 9 INJECTION INTRAMUSCULAR; INTRAVENOUS; SUBCUTANEOUS at 12:25

## 2021-09-07 RX ADMIN — Medication 10 MILLIGRAM(S): at 12:40

## 2021-09-07 RX ADMIN — Medication 510 MILLIGRAM(S): at 13:40

## 2021-09-07 RX ADMIN — FOSAPREPITANT DIMEGLUMINE 310 MILLIGRAM(S): 150 INJECTION, POWDER, LYOPHILIZED, FOR SOLUTION INTRAVENOUS at 12:40

## 2021-09-07 RX ADMIN — CISPLATIN 290 MILLIGRAM(S): 1 INJECTION, SOLUTION INTRAVENOUS at 14:40

## 2021-09-07 RX ADMIN — FOSAPREPITANT DIMEGLUMINE 150 MILLIGRAM(S): 150 INJECTION, POWDER, LYOPHILIZED, FOR SOLUTION INTRAVENOUS at 13:10

## 2021-09-07 RX ADMIN — SODIUM CHLORIDE 1000 MILLILITER(S): 9 INJECTION, SOLUTION INTRAVENOUS at 17:35

## 2021-09-07 RX ADMIN — Medication 200 MILLIGRAM(S): at 14:40

## 2021-09-07 RX ADMIN — Medication 440 UNIT(S): at 13:25

## 2021-09-07 RX ADMIN — SODIUM CHLORIDE 1000 MILLILITER(S): 9 INJECTION INTRAMUSCULAR; INTRAVENOUS; SUBCUTANEOUS at 11:25

## 2021-09-07 RX ADMIN — SODIUM CHLORIDE 500 MILLILITER(S): 9 INJECTION, SOLUTION INTRAVENOUS at 17:34

## 2021-09-07 RX ADMIN — ONDANSETRON 232 MILLIGRAM(S): 8 TABLET, FILM COATED ORAL at 13:10

## 2021-09-07 RX ADMIN — ONDANSETRON 16 MILLIGRAM(S): 8 TABLET, FILM COATED ORAL at 13:25

## 2021-09-07 RX ADMIN — Medication 204 MILLIGRAM(S): at 12:25

## 2021-09-07 NOTE — HISTORY OF PRESENT ILLNESS
[de-identified] : Mr. 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 Borrego), no adjuvant therapy given. He was non-compliant with surveillance. He presents to clinic today with large periaortic lymphadenopathy bx c/w recurrent seminoma (3 years out). Here  today for C1 BEP chemotherapy. \par \par ONC Hx:\par Mr. Noyola was diagnosed with Stage IB (pT2pNx) Seminoma in 2018 and underwent Left inguinal radical orchiectomy with Dr. Borrego on 7/6/2018 at [facility]. He presented to the ED at [facility] on 7/9/21 with Left flank pain x 1 week associated with nausea and vomiting. He had CT AP performed on 7/9/21, results showing two enlarged left periaortic soft tissue lymph nodes measuring up to 4.1 x 2.9 x 2.8 cm surrounding the proximal ROSSANA. Findings concerning for metastatic lymphadenopathy.\par \par Had cryptorchid testes, never a smoking. \par \par 6/28/2018 MRI Visceral Pelvis\par Enlargement and replacement of the left testis by an enhancing mass 5.8x4.0x3.2cm, consistent with a testicular malignancy.  \par \par 7/3/2018 CT AP at [facility]\par 1.Splenic hypodensity. Etiology uncertain. \par 2.There are 2 midly enlarged upper mesenteric lymph nodes. Significance uncertain.\par 3.Evidence of chronic pyelonephritis left kidney. Right renal cyst.\par 4.Enlarged and midly fatty liver\par 5.No significant adenopathy in the para-aortic region particularly in the region of the renal lolita. No inguinla lymph nodes\par \par 7/6/2018 Left inguinal radical orchiectomy. meidum size Coloplast 16ml testicular implant placement and plastic closure of inguinial incision with Tenzin Escobar at [facility]\par Pathology report\par -Left testicle and spermatic cord: Seminoma, 6.5cm\par -Specimen laterality : Left\par -Tumor focality:Multifocal\par -Tumor size\par Greatest dimension of main tumor mass : [id number]\par Greatest dimensions of additional tumor nodules: 1.1cm & 1cm\par -Intratubular Germ Cell Neoplasia : Intratubular seminoma \par -Seminoma :Seminoma \par -Timor extension\par Tumor invades rete testis\par Tumor invades into tunica albuginea very close to tunica vaginalis, involvement of vaginalis cannot be excluded\par -Spermatic cord margin : Uninvolved by tumor\par -Lymphovascular invasion : Present\par -Regional Lymph nodes : no lymph nodes submitted or found\par -Pathologic stage : pT2pNx\par \par 7/9/21 CT AP at [facility]\par Two enlarged left periaortic soft tissue lymph nodes measuring up to 4.1 x 2.9 x 2.8 cm surrounding the proximal ROSSANA. Findings concerning for metastatic lymphadenopathy.\par Nonspecific 1.6 cm splenic hypodensity.\par Left renal mid pole 3.5 cm cyst contains enhancing septations. Consider further evaluation with MRI as clinically warranted.\par \par 7/30/2021 CT CHEST:\par 1.  No thoracic adenopathy or metastases.\par 2.  Unchanged splenic hypodensity, not FDG avid on concurrent PET/CT, indeterminate.\par 3.  Incidental probable few left renal calyceal diverticuli. Urogram could be helpful for confirmation.\par \par 7/30/2021 PET/CT:\par - Two enlarged FDG avid para-aortic lymph nodes are suspicious for shane metastasis.\par - No additional sites of pathologic FDG uptake to suggest biologic tumor activity.\par \par 8/18/2021 Final Diagnosis\par Left retroperitoneal lymph node, core biopsy:\par - Fragments of lymph node, with minute foci suspicious for metastatic seminoma (see note).\par Addendum\par Immunostains on FNA cell block confirm Metastatic seminoma, highlighting abundant cells positive for  and  OCT 3/4, in background of lymphocytes.\par  [de-identified] : He presents to clinic today for C1 chemotherapy. Feels good and gained some weight. No new symptoms voiced. No nausea/vomiting, flank pain, nausea/vomiting, hematuria., fever, chills, night sweats, shortness of breath, cough, headache/dizziness/vision changes.

## 2021-09-07 NOTE — ASSESSMENT
[FreeTextEntry1] : Mr. 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 Borrego), no adjuvant therapy given. He was non-compliant with surveillance. He presents to clinic today with large periaortic lymphadenopathy bx c/w recurrent seminoma (3 years out). Here  today for C1 BEP chemotherapy.\par \par 6/28/2018 MRI Visceral Pelvis\par Enlargement and replacement of the left testis by an enhancing mass 5.8x4.0x3.2cm, consistent with a testicular malignancy.  \par \par 7/3/2018 CT AP at [facility]\par 1.Splenic hypodensity. Etiology uncertain. \par 2.There are 2 midly enlarged upper mesenteric lymph nodes. Significance uncertain.\par 3.Evidence of chronic pyelonephritis left kidney. Right renal cyst.\par 4.Enlarged and midly fatty liver\par 5.No significant adenopathy in the para-aortic region particularly in the region of the renal lolita. No inguinla lymph nodes\par \par 7/6/2018 Left inguinal radical orchiectomy. meidum size Coloplast 16ml testicular implant placement and plastic closure of inguinal incision with Tenzin Escobar at [facility]\par Pathology report\par -Left testicle and spermatic cord: Seminoma, 6.5cm\par -Specimen laterality : Left\par -Tumor focality:Multifocal\par -Tumor size\par Greatest dimension of main tumor mass : 6.5f9p3mm\par Greatest dimensions of additional tumor nodules: 1.1cm & 1cm\par -Intratubular Germ Cell Neoplasia : Intratubular seminoma \par -Seminoma :Seminoma \par -Timor extension\par Tumor invades rete testis\par Tumor invades into tunica albuginea very close to tunica vaginalis, involvement of vaginalis cannot be excluded\par -Spermatic cord margin : Uninvolved by tumor\par -Lymphovascular invasion : Present\par -Regional Lymph nodes : no lymph nodes submitted or found\par -Pathologic stage : pT2pNx\par \par 7/9/21 CT AP at [facility]\par Two enlarged left periaortic soft tissue lymph nodes measuring up to 4.1 x 2.9 x 2.8 cm surrounding the proximal ROSSANA. Findings concerning for metastatic lymphadenopathy.\par Nonspecific 1.6 cm splenic hypodensity.\par Left renal mid pole 3.5 cm cyst contains enhancing septations. Consider further evaluation with MRI as clinically warranted.\par \par 7/30/2021 CT CHEST:\par 1.  No thoracic adenopathy or metastases.\par 2.  Unchanged splenic hypodensity, not FDG avid on concurrent PET/CT, indeterminate.\par 3.  Incidental probable few left renal calyceal diverticuli. Urogram could be helpful for confirmation.\par \par 7/30/2021 PET/CT:\par - Two enlarged FDG avid para-aortic lymph nodes are suspicious for shane metastasis.\par - No additional sites of pathologic FDG uptake to suggest biologic tumor activity.\par \par 8/18/2021 Final Diagnosis\par Left retroperitoneal lymph node, core biopsy:\par - Fragments of lymph node, with minute foci suspicious for metastatic seminoma (see note).\par Addendum\par Immunostains on FNA cell block confirm Metastatic seminoma, highlighting abundant cells positive for  and  OCT 3/4, in background of lymphocytes.\par \par #) Suspected recurrent seminoma/retroperitoneal LN/abnl scans\par - Was diagnosed in 07/2018, no adjuvant therapy given, was not compliant with active surveillance. \par - CT A/P (07/09/21): Two enlarged left periaortic lymph nodes. Larger measures 4.1 x 2.9 x 2.8 cm. Pattern of spread is very consistent with recurrent seminoma. These LNs are PET-avid. \par -ctDNA pending since 7/27/21 and canceled [employer] Liquid Bx d/t insufficient cell free DNA.  With LDH elevated at 372, AFP -ve, HCG-TM 1 on 8/16/21 \par - CT Chest with contrast-no LN, PET scan as above\par - PFT testing in preparation for bleomycin therapy (done, nl 7/27/21). \par - Sperm banking for fertility preservation completed\par -Patient to seek a second opinion due to rarity of disease and toxicity of chemotherapy treatment\par -will risk stratify as w/u complete, good risk (no organ involvement and LDH is < 2.5 times ULN)\par - COVID vaccine completed x 2 (Pfizer),  booster dose taken last week\par - Had Urology appointment who said he was not a surgical candidate [place] [facility] [place] -Will give 3 cycles of BEP for recurrent seminoma (await liq bx results)\par -Bleomycin, etoposide, and cisplatin — For men with good-risk GCTs, suggest three cycles of standard BEP (bleomycin 30 units on days 1, 8, and 15; etoposide 100 mg/m2 on days 1 to 5; cisplatin 20 mg/m2 on days 1 to 5) administered on a 21-day cycle. This regimen of BEP is associated with an excellent survival outcome with limited toxicity\par -Will proceed with C1 BEP today. \par -Liq biopsy inconclusive, CT guided bx of LN on 8/18/21 with suspicious for metastatic seminoma\par -He was provided with patient educational materials. Risks, benefits and alternatives were discussed. He was also provided with the opportunity to asks questions. He signed consent for the treatment.\par \par \par Follow-up 9/7/21\par

## 2021-09-08 ENCOUNTER — APPOINTMENT (OUTPATIENT)
Age: 26
End: 2021-09-08

## 2021-09-08 ENCOUNTER — OUTPATIENT (OUTPATIENT)
Dept: OUTPATIENT SERVICES | Facility: HOSPITAL | Age: 26
LOS: 1 days | End: 2021-09-08
Payer: COMMERCIAL

## 2021-09-08 VITALS
DIASTOLIC BLOOD PRESSURE: 68 MMHG | HEART RATE: 76 BPM | OXYGEN SATURATION: 98 % | TEMPERATURE: 97 F | RESPIRATION RATE: 18 BRPM | SYSTOLIC BLOOD PRESSURE: 107 MMHG

## 2021-09-08 VITALS
HEIGHT: 70 IN | HEART RATE: 75 BPM | SYSTOLIC BLOOD PRESSURE: 122 MMHG | RESPIRATION RATE: 18 BRPM | TEMPERATURE: 98 F | OXYGEN SATURATION: 100 % | WEIGHT: 193.79 LBS | DIASTOLIC BLOOD PRESSURE: 73 MMHG

## 2021-09-08 DIAGNOSIS — C62.90 MALIGNANT NEOPLASM OF UNSPECIFIED TESTIS, UNSPECIFIED WHETHER DESCENDED OR UNDESCENDED: ICD-10-CM

## 2021-09-08 PROCEDURE — 96375 TX/PRO/DX INJ NEW DRUG ADDON: CPT

## 2021-09-08 PROCEDURE — 96361 HYDRATE IV INFUSION ADD-ON: CPT

## 2021-09-08 PROCEDURE — 96413 CHEMO IV INFUSION 1 HR: CPT

## 2021-09-08 PROCEDURE — 96417 CHEMO IV INFUS EACH ADDL SEQ: CPT

## 2021-09-08 RX ORDER — DEXAMETHASONE 0.5 MG/5ML
10 ELIXIR ORAL ONCE
Refills: 0 | Status: COMPLETED | OUTPATIENT
Start: 2021-09-08 | End: 2021-09-08

## 2021-09-08 RX ORDER — CISPLATIN 1 MG/ML
40 INJECTION, SOLUTION INTRAVENOUS ONCE
Refills: 0 | Status: COMPLETED | OUTPATIENT
Start: 2021-09-08 | End: 2021-09-08

## 2021-09-08 RX ORDER — ONDANSETRON 8 MG/1
16 TABLET, FILM COATED ORAL ONCE
Refills: 0 | Status: COMPLETED | OUTPATIENT
Start: 2021-09-08 | End: 2021-09-08

## 2021-09-08 RX ORDER — ETOPOSIDE 20 MG/ML
200 VIAL (ML) INTRAVENOUS ONCE
Refills: 0 | Status: COMPLETED | OUTPATIENT
Start: 2021-09-08 | End: 2021-09-08

## 2021-09-08 RX ADMIN — ONDANSETRON 232 MILLIGRAM(S): 8 TABLET, FILM COATED ORAL at 10:05

## 2021-09-08 RX ADMIN — Medication 200 MILLIGRAM(S): at 13:15

## 2021-09-08 RX ADMIN — SODIUM CHLORIDE 1000 MILLILITER(S): 9 INJECTION INTRAMUSCULAR; INTRAVENOUS; SUBCUTANEOUS at 14:00

## 2021-09-08 RX ADMIN — Medication 204 MILLIGRAM(S): at 09:46

## 2021-09-08 RX ADMIN — SODIUM CHLORIDE 1000 MILLILITER(S): 9 INJECTION, SOLUTION INTRAVENOUS at 15:36

## 2021-09-08 RX ADMIN — Medication 510 MILLIGRAM(S): at 12:15

## 2021-09-08 RX ADMIN — SODIUM CHLORIDE 500 MILLILITER(S): 9 INJECTION, SOLUTION INTRAVENOUS at 16:35

## 2021-09-08 RX ADMIN — Medication 10 MILLIGRAM(S): at 10:05

## 2021-09-08 RX ADMIN — CISPLATIN 290 MILLIGRAM(S): 1 INJECTION, SOLUTION INTRAVENOUS at 11:05

## 2021-09-08 RX ADMIN — CISPLATIN 40 MILLIGRAM(S): 1 INJECTION, SOLUTION INTRAVENOUS at 16:29

## 2021-09-08 RX ADMIN — ONDANSETRON 16 MILLIGRAM(S): 8 TABLET, FILM COATED ORAL at 10:20

## 2021-09-08 RX ADMIN — SODIUM CHLORIDE 1000 MILLILITER(S): 9 INJECTION INTRAMUSCULAR; INTRAVENOUS; SUBCUTANEOUS at 09:00

## 2021-09-09 ENCOUNTER — APPOINTMENT (OUTPATIENT)
Dept: INTERVENTIONAL RADIOLOGY/VASCULAR | Facility: HOSPITAL | Age: 26
End: 2021-09-09

## 2021-09-09 ENCOUNTER — OUTPATIENT (OUTPATIENT)
Dept: OUTPATIENT SERVICES | Facility: HOSPITAL | Age: 26
LOS: 1 days | End: 2021-09-09
Payer: COMMERCIAL

## 2021-09-09 ENCOUNTER — APPOINTMENT (OUTPATIENT)
Age: 26
End: 2021-09-09

## 2021-09-09 ENCOUNTER — NON-APPOINTMENT (OUTPATIENT)
Age: 26
End: 2021-09-09

## 2021-09-09 VITALS
RESPIRATION RATE: 18 BRPM | HEART RATE: 83 BPM | TEMPERATURE: 96 F | SYSTOLIC BLOOD PRESSURE: 121 MMHG | OXYGEN SATURATION: 99 % | DIASTOLIC BLOOD PRESSURE: 82 MMHG

## 2021-09-09 VITALS
OXYGEN SATURATION: 99 % | SYSTOLIC BLOOD PRESSURE: 121 MMHG | TEMPERATURE: 96 F | HEART RATE: 73 BPM | RESPIRATION RATE: 18 BRPM | DIASTOLIC BLOOD PRESSURE: 82 MMHG

## 2021-09-09 DIAGNOSIS — C62.90 MALIGNANT NEOPLASM OF UNSPECIFIED TESTIS, UNSPECIFIED WHETHER DESCENDED OR UNDESCENDED: ICD-10-CM

## 2021-09-09 DIAGNOSIS — R06.6 HICCOUGH: ICD-10-CM

## 2021-09-09 PROCEDURE — 96417 CHEMO IV INFUS EACH ADDL SEQ: CPT

## 2021-09-09 PROCEDURE — 96375 TX/PRO/DX INJ NEW DRUG ADDON: CPT

## 2021-09-09 PROCEDURE — 96413 CHEMO IV INFUSION 1 HR: CPT

## 2021-09-09 PROCEDURE — 96361 HYDRATE IV INFUSION ADD-ON: CPT

## 2021-09-09 RX ORDER — DEXAMETHASONE 0.5 MG/5ML
10 ELIXIR ORAL ONCE
Refills: 0 | Status: COMPLETED | OUTPATIENT
Start: 2021-09-09 | End: 2021-09-09

## 2021-09-09 RX ORDER — SODIUM CHLORIDE 9 MG/ML
1000 INJECTION INTRAMUSCULAR; INTRAVENOUS; SUBCUTANEOUS ONCE
Refills: 0 | Status: COMPLETED | OUTPATIENT
Start: 2021-09-09 | End: 2021-09-09

## 2021-09-09 RX ORDER — ONDANSETRON 8 MG/1
16 TABLET, FILM COATED ORAL ONCE
Refills: 0 | Status: COMPLETED | OUTPATIENT
Start: 2021-09-09 | End: 2021-09-09

## 2021-09-09 RX ORDER — ETOPOSIDE 20 MG/ML
200 VIAL (ML) INTRAVENOUS ONCE
Refills: 0 | Status: COMPLETED | OUTPATIENT
Start: 2021-09-09 | End: 2021-09-09

## 2021-09-09 RX ORDER — CISPLATIN 1 MG/ML
40 INJECTION, SOLUTION INTRAVENOUS ONCE
Refills: 0 | Status: COMPLETED | OUTPATIENT
Start: 2021-09-09 | End: 2021-09-09

## 2021-09-09 RX ORDER — SODIUM CHLORIDE 9 MG/ML
1000 INJECTION, SOLUTION INTRAVENOUS
Refills: 0 | Status: COMPLETED | OUTPATIENT
Start: 2021-09-09 | End: 2021-09-09

## 2021-09-09 RX ADMIN — CISPLATIN 40 MILLIGRAM(S): 1 INJECTION, SOLUTION INTRAVENOUS at 12:00

## 2021-09-09 RX ADMIN — CISPLATIN 290 MILLIGRAM(S): 1 INJECTION, SOLUTION INTRAVENOUS at 11:03

## 2021-09-09 RX ADMIN — Medication 204 MILLIGRAM(S): at 09:22

## 2021-09-09 RX ADMIN — SODIUM CHLORIDE 1000 MILLILITER(S): 9 INJECTION INTRAMUSCULAR; INTRAVENOUS; SUBCUTANEOUS at 09:00

## 2021-09-09 RX ADMIN — SODIUM CHLORIDE 1000 MILLILITER(S): 9 INJECTION, SOLUTION INTRAVENOUS at 14:05

## 2021-09-09 RX ADMIN — SODIUM CHLORIDE 506 MILLILITER(S): 9 INJECTION, SOLUTION INTRAVENOUS at 14:08

## 2021-09-09 RX ADMIN — Medication 510 MILLIGRAM(S): at 10:01

## 2021-09-09 RX ADMIN — Medication 10 MILLIGRAM(S): at 09:35

## 2021-09-09 RX ADMIN — ONDANSETRON 232 MILLIGRAM(S): 8 TABLET, FILM COATED ORAL at 09:35

## 2021-09-09 RX ADMIN — Medication 200 MILLIGRAM(S): at 11:00

## 2021-09-09 RX ADMIN — SODIUM CHLORIDE 1000 MILLILITER(S): 9 INJECTION INTRAMUSCULAR; INTRAVENOUS; SUBCUTANEOUS at 09:50

## 2021-09-09 RX ADMIN — ONDANSETRON 16 MILLIGRAM(S): 8 TABLET, FILM COATED ORAL at 09:45

## 2021-09-10 ENCOUNTER — OUTPATIENT (OUTPATIENT)
Dept: OUTPATIENT SERVICES | Facility: HOSPITAL | Age: 26
LOS: 1 days | End: 2021-09-10
Payer: COMMERCIAL

## 2021-09-10 ENCOUNTER — APPOINTMENT (OUTPATIENT)
Age: 26
End: 2021-09-10

## 2021-09-10 VITALS
HEART RATE: 65 BPM | RESPIRATION RATE: 18 BRPM | DIASTOLIC BLOOD PRESSURE: 74 MMHG | TEMPERATURE: 97 F | SYSTOLIC BLOOD PRESSURE: 126 MMHG | OXYGEN SATURATION: 98 %

## 2021-09-10 VITALS
DIASTOLIC BLOOD PRESSURE: 75 MMHG | RESPIRATION RATE: 18 BRPM | TEMPERATURE: 96 F | HEART RATE: 71 BPM | OXYGEN SATURATION: 99 % | SYSTOLIC BLOOD PRESSURE: 119 MMHG

## 2021-09-10 DIAGNOSIS — C62.90 MALIGNANT NEOPLASM OF UNSPECIFIED TESTIS, UNSPECIFIED WHETHER DESCENDED OR UNDESCENDED: ICD-10-CM

## 2021-09-10 LAB
BASOPHILS # BLD AUTO: 0.03 K/UL
BASOPHILS NFR BLD AUTO: 0.4 %
EOSINOPHIL # BLD AUTO: 0.24 K/UL
EOSINOPHIL NFR BLD AUTO: 3.4 %
HCT VFR BLD CALC: 47 %
HGB BLD-MCNC: 15.3 G/DL
IMM GRANULOCYTES NFR BLD AUTO: 0.3 %
LYMPHOCYTES # BLD AUTO: 1.48 K/UL
LYMPHOCYTES NFR BLD AUTO: 20.9 %
MAN DIFF?: NORMAL
MCHC RBC-ENTMCNC: 29.4 PG
MCHC RBC-ENTMCNC: 32.6 GM/DL
MCV RBC AUTO: 90.4 FL
MONOCYTES # BLD AUTO: 0.95 K/UL
MONOCYTES NFR BLD AUTO: 13.4 %
NEUTROPHILS # BLD AUTO: 4.37 K/UL
NEUTROPHILS NFR BLD AUTO: 61.6 %
PLATELET # BLD AUTO: 201 K/UL
RBC # BLD: 5.2 M/UL
RBC # FLD: 12.6 %
WBC # FLD AUTO: 7.09 K/UL

## 2021-09-10 PROCEDURE — 96413 CHEMO IV INFUSION 1 HR: CPT

## 2021-09-10 PROCEDURE — 96375 TX/PRO/DX INJ NEW DRUG ADDON: CPT

## 2021-09-10 PROCEDURE — 96417 CHEMO IV INFUS EACH ADDL SEQ: CPT

## 2021-09-10 PROCEDURE — 96361 HYDRATE IV INFUSION ADD-ON: CPT

## 2021-09-10 RX ORDER — SODIUM CHLORIDE 9 MG/ML
1000 INJECTION, SOLUTION INTRAVENOUS
Refills: 0 | Status: COMPLETED | OUTPATIENT
Start: 2021-09-10 | End: 2021-09-10

## 2021-09-10 RX ORDER — ONDANSETRON 8 MG/1
16 TABLET, FILM COATED ORAL ONCE
Refills: 0 | Status: COMPLETED | OUTPATIENT
Start: 2021-09-10 | End: 2021-09-10

## 2021-09-10 RX ORDER — SODIUM CHLORIDE 9 MG/ML
1000 INJECTION INTRAMUSCULAR; INTRAVENOUS; SUBCUTANEOUS ONCE
Refills: 0 | Status: COMPLETED | OUTPATIENT
Start: 2021-09-10 | End: 2021-09-10

## 2021-09-10 RX ORDER — ETOPOSIDE 20 MG/ML
200 VIAL (ML) INTRAVENOUS ONCE
Refills: 0 | Status: COMPLETED | OUTPATIENT
Start: 2021-09-10 | End: 2021-09-10

## 2021-09-10 RX ORDER — CISPLATIN 1 MG/ML
40 INJECTION, SOLUTION INTRAVENOUS ONCE
Refills: 0 | Status: COMPLETED | OUTPATIENT
Start: 2021-09-10 | End: 2021-09-10

## 2021-09-10 RX ORDER — DEXAMETHASONE 0.5 MG/5ML
10 ELIXIR ORAL ONCE
Refills: 0 | Status: COMPLETED | OUTPATIENT
Start: 2021-09-10 | End: 2021-09-10

## 2021-09-10 RX ADMIN — CISPLATIN 40 MILLIGRAM(S): 1 INJECTION, SOLUTION INTRAVENOUS at 11:40

## 2021-09-10 RX ADMIN — SODIUM CHLORIDE 1000 MILLILITER(S): 9 INJECTION INTRAMUSCULAR; INTRAVENOUS; SUBCUTANEOUS at 10:25

## 2021-09-10 RX ADMIN — Medication 510 MILLIGRAM(S): at 11:40

## 2021-09-10 RX ADMIN — ONDANSETRON 16 MILLIGRAM(S): 8 TABLET, FILM COATED ORAL at 09:50

## 2021-09-10 RX ADMIN — SODIUM CHLORIDE 1000 MILLILITER(S): 9 INJECTION INTRAMUSCULAR; INTRAVENOUS; SUBCUTANEOUS at 09:10

## 2021-09-10 RX ADMIN — SODIUM CHLORIDE 1000 MILLILITER(S): 9 INJECTION, SOLUTION INTRAVENOUS at 14:50

## 2021-09-10 RX ADMIN — Medication 204 MILLIGRAM(S): at 09:27

## 2021-09-10 RX ADMIN — Medication 200 MILLIGRAM(S): at 12:45

## 2021-09-10 RX ADMIN — CISPLATIN 290 MILLIGRAM(S): 1 INJECTION, SOLUTION INTRAVENOUS at 10:30

## 2021-09-10 RX ADMIN — Medication 10 MILLIGRAM(S): at 09:37

## 2021-09-10 RX ADMIN — ONDANSETRON 232 MILLIGRAM(S): 8 TABLET, FILM COATED ORAL at 09:40

## 2021-09-10 RX ADMIN — SODIUM CHLORIDE 506 MILLILITER(S): 9 INJECTION, SOLUTION INTRAVENOUS at 09:27

## 2021-09-13 ENCOUNTER — APPOINTMENT (OUTPATIENT)
Dept: HEMATOLOGY ONCOLOGY | Facility: CLINIC | Age: 26
End: 2021-09-13
Payer: COMMERCIAL

## 2021-09-13 ENCOUNTER — OUTPATIENT (OUTPATIENT)
Dept: OUTPATIENT SERVICES | Facility: HOSPITAL | Age: 26
LOS: 1 days | End: 2021-09-13
Payer: COMMERCIAL

## 2021-09-13 ENCOUNTER — APPOINTMENT (OUTPATIENT)
Age: 26
End: 2021-09-13

## 2021-09-13 VITALS
DIASTOLIC BLOOD PRESSURE: 75 MMHG | SYSTOLIC BLOOD PRESSURE: 123 MMHG | OXYGEN SATURATION: 99 % | HEART RATE: 64 BPM | TEMPERATURE: 96 F | RESPIRATION RATE: 18 BRPM

## 2021-09-13 VITALS
SYSTOLIC BLOOD PRESSURE: 114 MMHG | TEMPERATURE: 96 F | RESPIRATION RATE: 18 BRPM | DIASTOLIC BLOOD PRESSURE: 74 MMHG | OXYGEN SATURATION: 99 % | HEART RATE: 69 BPM

## 2021-09-13 DIAGNOSIS — C62.90 MALIGNANT NEOPLASM OF UNSPECIFIED TESTIS, UNSPECIFIED WHETHER DESCENDED OR UNDESCENDED: ICD-10-CM

## 2021-09-13 PROCEDURE — 96361 HYDRATE IV INFUSION ADD-ON: CPT

## 2021-09-13 PROCEDURE — 99214 OFFICE O/P EST MOD 30 MIN: CPT

## 2021-09-13 PROCEDURE — 96375 TX/PRO/DX INJ NEW DRUG ADDON: CPT

## 2021-09-13 PROCEDURE — 96417 CHEMO IV INFUS EACH ADDL SEQ: CPT

## 2021-09-13 PROCEDURE — 96413 CHEMO IV INFUSION 1 HR: CPT

## 2021-09-13 RX ORDER — ONDANSETRON 8 MG/1
16 TABLET, FILM COATED ORAL ONCE
Refills: 0 | Status: COMPLETED | OUTPATIENT
Start: 2021-09-13 | End: 2021-09-13

## 2021-09-13 RX ORDER — CISPLATIN 1 MG/ML
40 INJECTION, SOLUTION INTRAVENOUS ONCE
Refills: 0 | Status: COMPLETED | OUTPATIENT
Start: 2021-09-13 | End: 2021-09-13

## 2021-09-13 RX ORDER — ETOPOSIDE 20 MG/ML
200 VIAL (ML) INTRAVENOUS ONCE
Refills: 0 | Status: COMPLETED | OUTPATIENT
Start: 2021-09-13 | End: 2021-09-13

## 2021-09-13 RX ORDER — SODIUM CHLORIDE 9 MG/ML
1000 INJECTION INTRAMUSCULAR; INTRAVENOUS; SUBCUTANEOUS ONCE
Refills: 0 | Status: COMPLETED | OUTPATIENT
Start: 2021-09-13 | End: 2021-09-13

## 2021-09-13 RX ORDER — DEXAMETHASONE 0.5 MG/5ML
10 ELIXIR ORAL ONCE
Refills: 0 | Status: COMPLETED | OUTPATIENT
Start: 2021-09-13 | End: 2021-09-13

## 2021-09-13 RX ADMIN — ONDANSETRON 16 MILLIGRAM(S): 8 TABLET, FILM COATED ORAL at 09:45

## 2021-09-13 RX ADMIN — Medication 200 MILLIGRAM(S): at 12:15

## 2021-09-13 RX ADMIN — Medication 510 MILLIGRAM(S): at 11:10

## 2021-09-13 RX ADMIN — SODIUM CHLORIDE 1000 MILLILITER(S): 9 INJECTION, SOLUTION INTRAVENOUS at 15:25

## 2021-09-13 RX ADMIN — Medication 300 UNIT(S): at 15:27

## 2021-09-13 RX ADMIN — Medication 204 MILLIGRAM(S): at 09:10

## 2021-09-13 RX ADMIN — SODIUM CHLORIDE 1000 MILLILITER(S): 9 INJECTION INTRAMUSCULAR; INTRAVENOUS; SUBCUTANEOUS at 09:10

## 2021-09-13 RX ADMIN — CISPLATIN 290 MILLIGRAM(S): 1 INJECTION, SOLUTION INTRAVENOUS at 12:20

## 2021-09-13 RX ADMIN — ONDANSETRON 232 MILLIGRAM(S): 8 TABLET, FILM COATED ORAL at 09:25

## 2021-09-13 RX ADMIN — SODIUM CHLORIDE 500 MILLILITER(S): 9 INJECTION, SOLUTION INTRAVENOUS at 15:28

## 2021-09-13 RX ADMIN — Medication 10 MILLIGRAM(S): at 09:20

## 2021-09-13 RX ADMIN — CISPLATIN 40 MILLIGRAM(S): 1 INJECTION, SOLUTION INTRAVENOUS at 13:22

## 2021-09-13 RX ADMIN — SODIUM CHLORIDE 1000 MILLILITER(S): 9 INJECTION INTRAMUSCULAR; INTRAVENOUS; SUBCUTANEOUS at 10:44

## 2021-09-13 NOTE — ASSESSMENT
[FreeTextEntry1] : Mr. 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 Borrego), no adjuvant therapy given. He was non-compliant with surveillance. He presents to clinic today with large periaortic lymphadenopathy bx c/w recurrent seminoma (3 years out). Here  today for C1D5 BEP chemotherapy.\par \par 6/28/2018 MRI Visceral Pelvis\par Enlargement and replacement of the left testis by an enhancing mass 5.8x4.0x3.2cm, consistent with a testicular malignancy.  \par \par 7/3/2018 CT AP at [facility]\par 1.Splenic hypodensity. Etiology uncertain. \par 2.There are 2 midly enlarged upper mesenteric lymph nodes. Significance uncertain.\par 3.Evidence of chronic pyelonephritis left kidney. Right renal cyst.\par 4.Enlarged and midly fatty liver\par 5.No significant adenopathy in the para-aortic region particularly in the region of the renal lolita. No inguinla lymph nodes\par \par 7/6/2018 Left inguinal radical orchiectomy. meidum size Coloplast 16ml testicular implant placement and plastic closure of inguinal incision with Tenzin Escobar at [facility]\par Pathology report\par -Left testicle and spermatic cord: Seminoma, 6.5cm\par -Specimen laterality : Left\par -Tumor focality:Multifocal\par -Tumor size\par Greatest dimension of main tumor mass : 6.3j4z7hv\par Greatest dimensions of additional tumor nodules: 1.1cm & 1cm\par -Intratubular Germ Cell Neoplasia : Intratubular seminoma \par -Seminoma :Seminoma \par -Timor extension\par Tumor invades rete testis\par Tumor invades into tunica albuginea very close to tunica vaginalis, involvement of vaginalis cannot be excluded\par -Spermatic cord margin : Uninvolved by tumor\par -Lymphovascular invasion : Present\par -Regional Lymph nodes : no lymph nodes submitted or found\par -Pathologic stage : pT2pNx\par \par 7/9/21 CT AP at [facility]\par Two enlarged left periaortic soft tissue lymph nodes measuring up to 4.1 x 2.9 x 2.8 cm surrounding the proximal ROSSANA. Findings concerning for metastatic lymphadenopathy.\par Nonspecific 1.6 cm splenic hypodensity.\par Left renal mid pole 3.5 cm cyst contains enhancing septations. Consider further evaluation with MRI as clinically warranted.\par \par 7/30/2021 CT CHEST:\par 1.  No thoracic adenopathy or metastases.\par 2.  Unchanged splenic hypodensity, not FDG avid on concurrent PET/CT, indeterminate.\par 3.  Incidental probable few left renal calyceal diverticuli. Urogram could be helpful for confirmation.\par \par 7/30/2021 PET/CT:\par - Two enlarged FDG avid para-aortic lymph nodes are suspicious for shane metastasis.\par - No additional sites of pathologic FDG uptake to suggest biologic tumor activity.\par \par 8/18/2021 Final Diagnosis\par Left retroperitoneal lymph node, core biopsy:\par - Fragments of lymph node, with minute foci suspicious for metastatic seminoma (see note).\par Addendum\par Immunostains on FNA cell block confirm Metastatic seminoma, highlighting abundant cells positive for  and  OCT 3/4, in background of lymphocytes.\par \par #) Seminoma/retroperitoneal LN/abnl scans\par - Was diagnosed in 07/2018, no adjuvant therapy given, was not compliant with active surveillance. \par - CT A/P (07/09/21): Two enlarged left periaortic lymph nodes. Larger measures 4.1 x 2.9 x 2.8 cm. Pattern of spread is very consistent with recurrent seminoma. These LNs are PET-avid. \par -ctDNA 7/27/21 detected, and canceled FoundationOne Liquid Bx d/t insufficient cell free DNA.  With LDH elevated at 372, AFP -ve, HCG-TM 1 on 8/16/21 \par - CT Chest with contrast-no LN, PET scan as above\par - PFT testing in preparation for bleomycin therapy (done, nl 7/27/21). \par - Sperm banking for fertility preservation completed\par -Patient to seek a second opinion due to rarity of disease and toxicity of chemotherapy treatment\par -will risk stratify as w/u complete, good risk (no organ involvement and LDH is < 2.5 times ULN)\par - COVID vaccine completed x 2 (Pfizer),  booster dose taken last week\par - Had Urology appointment who said he was not a surgical candidate (Dr. Schoenfeld from [facility] [place]par -Will give 3 cycles of BEP for recurrent seminoma (await liq bx results)\par -Bleomycin, etoposide, and cisplatin — For men with good-risk GCTs, suggest three cycles of standard BEP (bleomycin 30 units on days 1, 8, and 15; etoposide 100 mg/m2 on days 1 to 5; cisplatin 20 mg/m2 on days 1 to 5) administered on a 21-day cycle. This regimen of BEP is associated with an excellent survival outcome with limited toxicity\par -Will proceed with C1D5 BEP today. \par -Liq biopsy inconclusive, CT guided bx of LN on 8/18/21 with suspicious for metastatic seminoma\par -Has nausea that responds to compazine/zofran\par -No neulasta unless ANC<1000\par \par Follow-up 1 week [place]\par

## 2021-09-13 NOTE — HISTORY OF PRESENT ILLNESS
[de-identified] : Mr. 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 Borrego), no adjuvant therapy given. He was non-compliant with surveillance. He presents to clinic today with large periaortic lymphadenopathy bx c/w recurrent seminoma (3 years out). Here  today for C1D5 BEP chemotherapy. \par \par ONC Hx:\par Mr. Noyola was diagnosed with Stage IB (pT2pNx) Seminoma in 2018 and underwent Left inguinal radical orchiectomy with Dr. Borrego on 7/6/2018 at [facility]. He presented to the ED at [facility] on 7/9/21 with Left flank pain x 1 week associated with nausea and vomiting. He had CT AP performed on 7/9/21, results showing two enlarged left periaortic soft tissue lymph nodes measuring up to 4.1 x 2.9 x 2.8 cm surrounding the proximal ROSSANA. Findings concerning for metastatic lymphadenopathy.\par \par Had cryptorchid testes, never a smoking. \par \par 6/28/2018 MRI Visceral Pelvis\par Enlargement and replacement of the left testis by an enhancing mass 5.8x4.0x3.2cm, consistent with a testicular malignancy.  \par \par 7/3/2018 CT AP at [facility]\par 1.Splenic hypodensity. Etiology uncertain. \par 2.There are 2 midly enlarged upper mesenteric lymph nodes. Significance uncertain.\par 3.Evidence of chronic pyelonephritis left kidney. Right renal cyst.\par 4.Enlarged and midly fatty liver\par 5.No significant adenopathy in the para-aortic region particularly in the region of the renal lolita. No inguinla lymph nodes\par \par 7/6/2018 Left inguinal radical orchiectomy. meidum size Coloplast 16ml testicular implant placement and plastic closure of inguinial incision with Tenzin Escobar at [facility]\par Pathology report\par -Left testicle and spermatic cord: Seminoma, 6.5cm\par -Specimen laterality : Left\par -Tumor focality:Multifocal\par -Tumor size\par Greatest dimension of main tumor mass : 6.7k1m2ki\par Greatest dimensions of additional tumor nodules: 1.1cm & 1cm\par -Intratubular Germ Cell Neoplasia : Intratubular seminoma \par -Seminoma :Seminoma \par -Timor extension\par Tumor invades rete testis\par Tumor invades into tunica albuginea very close to tunica vaginalis, involvement of vaginalis cannot be excluded\par -Spermatic cord margin : Uninvolved by tumor\par -Lymphovascular invasion : Present\par -Regional Lymph nodes : no lymph nodes submitted or found\par -Pathologic stage : pT2pNx\par \par 7/9/21 CT AP at [facility]\par Two enlarged left periaortic soft tissue lymph nodes measuring up to 4.1 x 2.9 x 2.8 cm surrounding the proximal ROSSANA. Findings concerning for metastatic lymphadenopathy.\par Nonspecific 1.6 cm splenic hypodensity.\par Left renal mid pole 3.5 cm cyst contains enhancing septations. Consider further evaluation with MRI as clinically warranted.\par \par 7/30/2021 CT CHEST:\par 1.  No thoracic adenopathy or metastases.\par 2.  Unchanged splenic hypodensity, not FDG avid on concurrent PET/CT, indeterminate.\par 3.  Incidental probable few left renal calyceal diverticuli. Urogram could be helpful for confirmation.\par \par 7/30/2021 PET/CT:\par - Two enlarged FDG avid para-aortic lymph nodes are suspicious for shane metastasis.\par - No additional sites of pathologic FDG uptake to suggest biologic tumor activity.\par \par 8/18/2021 Final Diagnosis\par Left retroperitoneal lymph node, core biopsy:\par - Fragments of lymph node, with minute foci suspicious for metastatic seminoma (see note).\par Addendum\par Immunostains on FNA cell block confirm Metastatic seminoma, highlighting abundant cells positive for  and  OCT 3/4, in background of lymphocytes.\par  [de-identified] : He presents to clinic today for C1D5 chemotherapy. Has nausea responds to compazine/zofran, no /vomiting, flank pain, nausea/vomiting, hematuria., fever, chills, night sweats, shortness of breath, cough, headache/dizziness/vision changes.

## 2021-09-14 ENCOUNTER — APPOINTMENT (OUTPATIENT)
Age: 26
End: 2021-09-14

## 2021-09-14 ENCOUNTER — APPOINTMENT (OUTPATIENT)
Dept: HEMATOLOGY ONCOLOGY | Facility: CLINIC | Age: 26
End: 2021-09-14
Payer: COMMERCIAL

## 2021-09-15 ENCOUNTER — APPOINTMENT (OUTPATIENT)
Age: 26
End: 2021-09-15

## 2021-09-16 ENCOUNTER — OUTPATIENT (OUTPATIENT)
Dept: OUTPATIENT SERVICES | Facility: HOSPITAL | Age: 26
LOS: 1 days | End: 2021-09-16
Payer: COMMERCIAL

## 2021-09-16 VITALS
RESPIRATION RATE: 17 BRPM | OXYGEN SATURATION: 100 % | WEIGHT: 186.07 LBS | HEART RATE: 68 BPM | HEIGHT: 71 IN | SYSTOLIC BLOOD PRESSURE: 120 MMHG | DIASTOLIC BLOOD PRESSURE: 80 MMHG | TEMPERATURE: 97 F

## 2021-09-16 VITALS
RESPIRATION RATE: 16 BRPM | HEART RATE: 70 BPM | DIASTOLIC BLOOD PRESSURE: 76 MMHG | SYSTOLIC BLOOD PRESSURE: 122 MMHG | TEMPERATURE: 98 F | OXYGEN SATURATION: 99 %

## 2021-09-16 DIAGNOSIS — C62.90 MALIGNANT NEOPLASM OF UNSPECIFIED TESTIS, UNSPECIFIED WHETHER DESCENDED OR UNDESCENDED: ICD-10-CM

## 2021-09-16 LAB
ALBUMIN SERPL ELPH-MCNC: 3.8 G/DL — SIGNIFICANT CHANGE UP (ref 3.3–5)
ALP SERPL-CCNC: 80 U/L — SIGNIFICANT CHANGE UP (ref 40–120)
ALT FLD-CCNC: 19 U/L — SIGNIFICANT CHANGE UP (ref 10–45)
ANION GAP SERPL CALC-SCNC: 5 MMOL/L — SIGNIFICANT CHANGE UP (ref 5–17)
AST SERPL-CCNC: 20 U/L — SIGNIFICANT CHANGE UP (ref 10–40)
BILIRUB SERPL-MCNC: 0.8 MG/DL — SIGNIFICANT CHANGE UP (ref 0.2–1.2)
BUN SERPL-MCNC: 17 MG/DL — SIGNIFICANT CHANGE UP (ref 7–23)
CALCIUM SERPL-MCNC: 9.4 MG/DL — SIGNIFICANT CHANGE UP (ref 8.4–10.5)
CHLORIDE SERPL-SCNC: 103 MMOL/L — SIGNIFICANT CHANGE UP (ref 96–108)
CO2 SERPL-SCNC: 28 MMOL/L — SIGNIFICANT CHANGE UP (ref 22–31)
CREAT SERPL-MCNC: 1.1 MG/DL — SIGNIFICANT CHANGE UP (ref 0.5–1.3)
GLUCOSE SERPL-MCNC: 106 MG/DL — HIGH (ref 70–99)
HCT VFR BLD CALC: 44.1 % — SIGNIFICANT CHANGE UP (ref 39–50)
HGB BLD-MCNC: 14.9 G/DL — SIGNIFICANT CHANGE UP (ref 13–17)
LDH SERPL L TO P-CCNC: SIGNIFICANT CHANGE UP (ref 50–242)
LYMPHOCYTES # BLD AUTO: 0.9 K/UL — LOW (ref 1–3.3)
LYMPHOCYTES # BLD AUTO: 22.4 % — SIGNIFICANT CHANGE UP (ref 13–44)
MCHC RBC-ENTMCNC: 30 PG — SIGNIFICANT CHANGE UP (ref 27–34)
MCHC RBC-ENTMCNC: 33.8 GM/DL — SIGNIFICANT CHANGE UP (ref 32–36)
MCV RBC AUTO: 88.9 FL — SIGNIFICANT CHANGE UP (ref 80–100)
NEUTROPHILS # BLD AUTO: 3.3 K/UL — SIGNIFICANT CHANGE UP (ref 1.8–7.4)
NEUTROPHILS NFR BLD AUTO: 76.7 % — SIGNIFICANT CHANGE UP (ref 43–77)
PLATELET # BLD AUTO: 153 K/UL — SIGNIFICANT CHANGE UP (ref 150–400)
POTASSIUM SERPL-MCNC: 4 MMOL/L — SIGNIFICANT CHANGE UP (ref 3.5–5.3)
POTASSIUM SERPL-SCNC: 4 MMOL/L — SIGNIFICANT CHANGE UP (ref 3.5–5.3)
PROT SERPL-MCNC: 6.9 G/DL — SIGNIFICANT CHANGE UP (ref 6–8.3)
RBC # BLD: 4.96 M/UL — SIGNIFICANT CHANGE UP (ref 4.2–5.8)
RBC # FLD: 12.4 % — SIGNIFICANT CHANGE UP (ref 10.3–14.5)
SODIUM SERPL-SCNC: 136 MMOL/L — SIGNIFICANT CHANGE UP (ref 135–145)
WBC # BLD: 4.2 K/UL — SIGNIFICANT CHANGE UP (ref 3.8–10.5)
WBC # FLD AUTO: 4.2 K/UL — SIGNIFICANT CHANGE UP (ref 3.8–10.5)

## 2021-09-16 PROCEDURE — 96409 CHEMO IV PUSH SNGL DRUG: CPT

## 2021-09-16 PROCEDURE — 85025 COMPLETE CBC W/AUTO DIFF WBC: CPT

## 2021-09-16 PROCEDURE — 36415 COLL VENOUS BLD VENIPUNCTURE: CPT

## 2021-09-16 PROCEDURE — 82105 ALPHA-FETOPROTEIN SERUM: CPT

## 2021-09-16 PROCEDURE — 83615 LACTATE (LD) (LDH) ENZYME: CPT

## 2021-09-16 PROCEDURE — 80053 COMPREHEN METABOLIC PANEL: CPT

## 2021-09-16 RX ORDER — BLEOMYCIN SULFATE 30 UNIT
30 VIAL (EA) INJECTION ONCE
Refills: 0 | Status: COMPLETED | OUTPATIENT
Start: 2021-09-16 | End: 2021-09-16

## 2021-09-16 RX ADMIN — Medication 440 UNIT(S): at 10:46

## 2021-09-16 RX ADMIN — Medication 30 UNIT(S): at 11:04

## 2021-09-17 ENCOUNTER — APPOINTMENT (OUTPATIENT)
Age: 26
End: 2021-09-17

## 2021-09-17 LAB — AFP-TM SERPL-MCNC: <1.8 NG/ML — SIGNIFICANT CHANGE UP

## 2021-09-23 ENCOUNTER — OUTPATIENT (OUTPATIENT)
Dept: OUTPATIENT SERVICES | Facility: HOSPITAL | Age: 26
LOS: 1 days | End: 2021-09-23
Payer: COMMERCIAL

## 2021-09-23 ENCOUNTER — APPOINTMENT (OUTPATIENT)
Age: 26
End: 2021-09-23

## 2021-09-23 VITALS
OXYGEN SATURATION: 98 % | SYSTOLIC BLOOD PRESSURE: 118 MMHG | HEART RATE: 90 BPM | RESPIRATION RATE: 18 BRPM | DIASTOLIC BLOOD PRESSURE: 73 MMHG | TEMPERATURE: 98 F

## 2021-09-23 DIAGNOSIS — T45.1X5A AGRANULOCYTOSIS SECONDARY TO CANCER CHEMOTHERAPY: ICD-10-CM

## 2021-09-23 DIAGNOSIS — D70.1 AGRANULOCYTOSIS SECONDARY TO CANCER CHEMOTHERAPY: ICD-10-CM

## 2021-09-23 DIAGNOSIS — C62.90 MALIGNANT NEOPLASM OF UNSPECIFIED TESTIS, UNSPECIFIED WHETHER DESCENDED OR UNDESCENDED: ICD-10-CM

## 2021-09-23 LAB
AFP-TM SERPL-MCNC: <1.8 NG/ML — SIGNIFICANT CHANGE UP
ALBUMIN SERPL ELPH-MCNC: 3.7 G/DL — SIGNIFICANT CHANGE UP (ref 3.3–5)
ALP SERPL-CCNC: 86 U/L — SIGNIFICANT CHANGE UP (ref 40–120)
ALT FLD-CCNC: 23 U/L — SIGNIFICANT CHANGE UP (ref 10–45)
ANION GAP SERPL CALC-SCNC: 11 MMOL/L — SIGNIFICANT CHANGE UP (ref 5–17)
AST SERPL-CCNC: 27 U/L — SIGNIFICANT CHANGE UP (ref 10–40)
BILIRUB SERPL-MCNC: 0.4 MG/DL — SIGNIFICANT CHANGE UP (ref 0.2–1.2)
BUN SERPL-MCNC: 12 MG/DL — SIGNIFICANT CHANGE UP (ref 7–23)
CALCIUM SERPL-MCNC: 9.9 MG/DL — SIGNIFICANT CHANGE UP (ref 8.4–10.5)
CHLORIDE SERPL-SCNC: 104 MMOL/L — SIGNIFICANT CHANGE UP (ref 96–108)
CO2 SERPL-SCNC: 27 MMOL/L — SIGNIFICANT CHANGE UP (ref 22–31)
CREAT SERPL-MCNC: 0.7 MG/DL — SIGNIFICANT CHANGE UP (ref 0.5–1.3)
GLUCOSE SERPL-MCNC: 121 MG/DL — HIGH (ref 70–99)
HCG-TM SERPL-ACNC: <1 MIU/ML — SIGNIFICANT CHANGE UP
HCT VFR BLD CALC: 42.5 % — SIGNIFICANT CHANGE UP (ref 39–50)
HGB BLD-MCNC: 14.3 G/DL — SIGNIFICANT CHANGE UP (ref 13–17)
LDH SERPL L TO P-CCNC: 184 U/L — SIGNIFICANT CHANGE UP (ref 50–242)
LYMPHOCYTES # BLD AUTO: 1.1 K/UL — SIGNIFICANT CHANGE UP (ref 1–3.3)
LYMPHOCYTES # BLD AUTO: 66.5 % — HIGH (ref 13–44)
MCHC RBC-ENTMCNC: 29.6 PG — SIGNIFICANT CHANGE UP (ref 27–34)
MCHC RBC-ENTMCNC: 33.6 GM/DL — SIGNIFICANT CHANGE UP (ref 32–36)
MCV RBC AUTO: 88 FL — SIGNIFICANT CHANGE UP (ref 80–100)
NEUTROPHILS # BLD AUTO: 0.3 K/UL — LOW (ref 1.8–7.4)
NEUTROPHILS NFR BLD AUTO: 17.5 % — LOW (ref 43–77)
PLATELET # BLD AUTO: 161 K/UL — SIGNIFICANT CHANGE UP (ref 150–400)
POTASSIUM SERPL-MCNC: 4.1 MMOL/L — SIGNIFICANT CHANGE UP (ref 3.5–5.3)
POTASSIUM SERPL-SCNC: 4.1 MMOL/L — SIGNIFICANT CHANGE UP (ref 3.5–5.3)
PROT SERPL-MCNC: 7.1 G/DL — SIGNIFICANT CHANGE UP (ref 6–8.3)
RBC # BLD: 4.83 M/UL — SIGNIFICANT CHANGE UP (ref 4.2–5.8)
RBC # FLD: 12.7 % — SIGNIFICANT CHANGE UP (ref 10.3–14.5)
SODIUM SERPL-SCNC: 142 MMOL/L — SIGNIFICANT CHANGE UP (ref 135–145)
WBC # BLD: 1.7 K/UL — LOW (ref 3.8–10.5)
WBC # FLD AUTO: 1.7 K/UL — LOW (ref 3.8–10.5)

## 2021-09-23 PROCEDURE — 36415 COLL VENOUS BLD VENIPUNCTURE: CPT

## 2021-09-23 PROCEDURE — 84704 HCG FREE BETACHAIN TEST: CPT

## 2021-09-23 PROCEDURE — 82105 ALPHA-FETOPROTEIN SERUM: CPT

## 2021-09-23 PROCEDURE — 85025 COMPLETE CBC W/AUTO DIFF WBC: CPT

## 2021-09-23 PROCEDURE — 80053 COMPREHEN METABOLIC PANEL: CPT

## 2021-09-23 PROCEDURE — 83615 LACTATE (LD) (LDH) ENZYME: CPT

## 2021-09-23 RX ORDER — PEGFILGRASTIM-CBQV 6 MG/.6ML
6 INJECTION, SOLUTION SUBCUTANEOUS ONCE
Refills: 0 | Status: DISCONTINUED | OUTPATIENT
Start: 2021-09-23 | End: 2021-09-23

## 2021-09-27 ENCOUNTER — LABORATORY RESULT (OUTPATIENT)
Age: 26
End: 2021-09-27

## 2021-09-27 ENCOUNTER — OUTPATIENT (OUTPATIENT)
Dept: OUTPATIENT SERVICES | Facility: HOSPITAL | Age: 26
LOS: 1 days | End: 2021-09-27
Payer: COMMERCIAL

## 2021-09-27 ENCOUNTER — APPOINTMENT (OUTPATIENT)
Age: 26
End: 2021-09-27

## 2021-09-27 ENCOUNTER — APPOINTMENT (OUTPATIENT)
Dept: HEMATOLOGY ONCOLOGY | Facility: CLINIC | Age: 26
End: 2021-09-27
Payer: COMMERCIAL

## 2021-09-27 VITALS
TEMPERATURE: 98 F | DIASTOLIC BLOOD PRESSURE: 72 MMHG | SYSTOLIC BLOOD PRESSURE: 116 MMHG | RESPIRATION RATE: 18 BRPM | HEART RATE: 76 BPM | OXYGEN SATURATION: 99 %

## 2021-09-27 DIAGNOSIS — C62.90 MALIGNANT NEOPLASM OF UNSPECIFIED TESTIS, UNSPECIFIED WHETHER DESCENDED OR UNDESCENDED: ICD-10-CM

## 2021-09-27 PROCEDURE — 36415 COLL VENOUS BLD VENIPUNCTURE: CPT

## 2021-09-27 PROCEDURE — 96409 CHEMO IV PUSH SNGL DRUG: CPT

## 2021-09-27 RX ORDER — ONDANSETRON 8 MG/1
16 TABLET, FILM COATED ORAL ONCE
Refills: 0 | Status: DISCONTINUED | OUTPATIENT
Start: 2021-09-27 | End: 2021-09-27

## 2021-09-27 RX ORDER — BLEOMYCIN SULFATE 30 UNIT
30 VIAL (EA) INJECTION ONCE
Refills: 0 | Status: COMPLETED | OUTPATIENT
Start: 2021-09-27 | End: 2021-09-27

## 2021-09-27 RX ORDER — DEXAMETHASONE 0.5 MG/5ML
10 ELIXIR ORAL ONCE
Refills: 0 | Status: DISCONTINUED | OUTPATIENT
Start: 2021-09-27 | End: 2021-09-27

## 2021-09-27 RX ORDER — FOSAPREPITANT DIMEGLUMINE 150 MG/5ML
150 INJECTION, POWDER, LYOPHILIZED, FOR SOLUTION INTRAVENOUS ONCE
Refills: 0 | Status: DISCONTINUED | OUTPATIENT
Start: 2021-09-27 | End: 2021-09-27

## 2021-09-27 RX ADMIN — Medication 440 UNIT(S): at 14:45

## 2021-09-27 RX ADMIN — Medication 30 UNIT(S): at 15:15

## 2021-09-30 ENCOUNTER — APPOINTMENT (OUTPATIENT)
Dept: PULMONOLOGY | Facility: CLINIC | Age: 26
End: 2021-09-30

## 2021-10-04 ENCOUNTER — OUTPATIENT (OUTPATIENT)
Dept: OUTPATIENT SERVICES | Facility: HOSPITAL | Age: 26
LOS: 1 days | End: 2021-10-04
Payer: COMMERCIAL

## 2021-10-04 ENCOUNTER — APPOINTMENT (OUTPATIENT)
Age: 26
End: 2021-10-04

## 2021-10-04 ENCOUNTER — APPOINTMENT (OUTPATIENT)
Dept: HEMATOLOGY ONCOLOGY | Facility: CLINIC | Age: 26
End: 2021-10-04
Payer: COMMERCIAL

## 2021-10-04 VITALS
SYSTOLIC BLOOD PRESSURE: 109 MMHG | DIASTOLIC BLOOD PRESSURE: 63 MMHG | HEIGHT: 71 IN | TEMPERATURE: 98 F | RESPIRATION RATE: 18 BRPM | HEART RATE: 88 BPM | WEIGHT: 194.01 LBS | OXYGEN SATURATION: 99 %

## 2021-10-04 VITALS
SYSTOLIC BLOOD PRESSURE: 109 MMHG | HEIGHT: 70 IN | RESPIRATION RATE: 18 BRPM | HEART RATE: 88 BPM | OXYGEN SATURATION: 99 % | WEIGHT: 192 LBS | DIASTOLIC BLOOD PRESSURE: 63 MMHG | TEMPERATURE: 98.2 F | BODY MASS INDEX: 27.49 KG/M2

## 2021-10-04 VITALS
SYSTOLIC BLOOD PRESSURE: 114 MMHG | OXYGEN SATURATION: 100 % | DIASTOLIC BLOOD PRESSURE: 72 MMHG | RESPIRATION RATE: 18 BRPM | TEMPERATURE: 98 F | HEART RATE: 74 BPM

## 2021-10-04 LAB
AFP-TM SERPL-MCNC: <1.8 NG/ML — SIGNIFICANT CHANGE UP
ALBUMIN SERPL ELPH-MCNC: 3.6 G/DL — SIGNIFICANT CHANGE UP (ref 3.3–5)
ALP SERPL-CCNC: 78 U/L — SIGNIFICANT CHANGE UP (ref 40–120)
ALT FLD-CCNC: 23 U/L — SIGNIFICANT CHANGE UP (ref 10–45)
ANION GAP SERPL CALC-SCNC: 8 MMOL/L — SIGNIFICANT CHANGE UP (ref 5–17)
AST SERPL-CCNC: 27 U/L — SIGNIFICANT CHANGE UP (ref 10–40)
BILIRUB SERPL-MCNC: 0.5 MG/DL — SIGNIFICANT CHANGE UP (ref 0.2–1.2)
BUN SERPL-MCNC: 13 MG/DL — SIGNIFICANT CHANGE UP (ref 7–23)
CALCIUM SERPL-MCNC: 9.4 MG/DL — SIGNIFICANT CHANGE UP (ref 8.4–10.5)
CHLORIDE SERPL-SCNC: 106 MMOL/L — SIGNIFICANT CHANGE UP (ref 96–108)
CO2 SERPL-SCNC: 26 MMOL/L — SIGNIFICANT CHANGE UP (ref 22–31)
CREAT SERPL-MCNC: 1 MG/DL — SIGNIFICANT CHANGE UP (ref 0.5–1.3)
GLUCOSE SERPL-MCNC: 106 MG/DL — HIGH (ref 70–99)
HCG-TM SERPL-ACNC: <1 MIU/ML — SIGNIFICANT CHANGE UP
HCT VFR BLD CALC: 36.3 % — LOW (ref 39–50)
HGB BLD-MCNC: 13.7 G/DL — SIGNIFICANT CHANGE UP (ref 13–17)
LDH SERPL L TO P-CCNC: 237 U/L — SIGNIFICANT CHANGE UP (ref 50–242)
LYMPHOCYTES # BLD AUTO: 1.6 K/UL — SIGNIFICANT CHANGE UP (ref 1–3.3)
LYMPHOCYTES # BLD AUTO: 24.5 % — SIGNIFICANT CHANGE UP (ref 13–44)
MCHC RBC-ENTMCNC: 33.1 PG — SIGNIFICANT CHANGE UP (ref 27–34)
MCHC RBC-ENTMCNC: 37.7 GM/DL — HIGH (ref 32–36)
MCV RBC AUTO: 87.7 FL — SIGNIFICANT CHANGE UP (ref 80–100)
NEUTROPHILS # BLD AUTO: 3.7 K/UL — SIGNIFICANT CHANGE UP (ref 1.8–7.4)
NEUTROPHILS NFR BLD AUTO: 58.6 % — SIGNIFICANT CHANGE UP (ref 43–77)
PLATELET # BLD AUTO: 185 K/UL — SIGNIFICANT CHANGE UP (ref 150–400)
POTASSIUM SERPL-MCNC: 3.9 MMOL/L — SIGNIFICANT CHANGE UP (ref 3.5–5.3)
POTASSIUM SERPL-SCNC: 3.9 MMOL/L — SIGNIFICANT CHANGE UP (ref 3.5–5.3)
PROT SERPL-MCNC: 6.9 G/DL — SIGNIFICANT CHANGE UP (ref 6–8.3)
RBC # BLD: 4.14 M/UL — LOW (ref 4.2–5.8)
RBC # FLD: 13.4 % — SIGNIFICANT CHANGE UP (ref 10.3–14.5)
SODIUM SERPL-SCNC: 140 MMOL/L — SIGNIFICANT CHANGE UP (ref 135–145)
WBC # BLD: 6.4 K/UL — SIGNIFICANT CHANGE UP (ref 3.8–10.5)
WBC # FLD AUTO: 6.4 K/UL — SIGNIFICANT CHANGE UP (ref 3.8–10.5)

## 2021-10-04 PROCEDURE — 80053 COMPREHEN METABOLIC PANEL: CPT

## 2021-10-04 PROCEDURE — 82105 ALPHA-FETOPROTEIN SERUM: CPT

## 2021-10-04 PROCEDURE — 36415 COLL VENOUS BLD VENIPUNCTURE: CPT

## 2021-10-04 PROCEDURE — 99214 OFFICE O/P EST MOD 30 MIN: CPT

## 2021-10-04 PROCEDURE — 85025 COMPLETE CBC W/AUTO DIFF WBC: CPT

## 2021-10-04 PROCEDURE — 84704 HCG FREE BETACHAIN TEST: CPT

## 2021-10-04 PROCEDURE — 96367 TX/PROPH/DG ADDL SEQ IV INF: CPT

## 2021-10-04 PROCEDURE — 96361 HYDRATE IV INFUSION ADD-ON: CPT

## 2021-10-04 PROCEDURE — 96411 CHEMO IV PUSH ADDL DRUG: CPT

## 2021-10-04 PROCEDURE — 96413 CHEMO IV INFUSION 1 HR: CPT

## 2021-10-04 PROCEDURE — 83615 LACTATE (LD) (LDH) ENZYME: CPT

## 2021-10-04 PROCEDURE — 96417 CHEMO IV INFUS EACH ADDL SEQ: CPT

## 2021-10-04 PROCEDURE — 96375 TX/PRO/DX INJ NEW DRUG ADDON: CPT

## 2021-10-04 RX ORDER — SODIUM CHLORIDE 9 MG/ML
1000 INJECTION, SOLUTION INTRAVENOUS
Refills: 0 | Status: COMPLETED | OUTPATIENT
Start: 2021-10-04 | End: 2021-10-04

## 2021-10-04 RX ORDER — SODIUM CHLORIDE 9 MG/ML
1000 INJECTION, SOLUTION INTRAVENOUS
Refills: 0 | Status: COMPLETED | OUTPATIENT
Start: 2021-10-05 | End: 2021-10-05

## 2021-10-04 RX ORDER — BLEOMYCIN SULFATE 30 UNIT
30 VIAL (EA) INJECTION ONCE
Refills: 0 | Status: COMPLETED | OUTPATIENT
Start: 2021-10-04 | End: 2021-10-04

## 2021-10-04 RX ORDER — FOSAPREPITANT DIMEGLUMINE 150 MG/5ML
150 INJECTION, POWDER, LYOPHILIZED, FOR SOLUTION INTRAVENOUS ONCE
Refills: 0 | Status: COMPLETED | OUTPATIENT
Start: 2021-10-04 | End: 2021-10-04

## 2021-10-04 RX ORDER — CISPLATIN 1 MG/ML
40 INJECTION, SOLUTION INTRAVENOUS ONCE
Refills: 0 | Status: COMPLETED | OUTPATIENT
Start: 2021-10-04 | End: 2021-10-04

## 2021-10-04 RX ORDER — DEXAMETHASONE 0.5 MG/5ML
10 ELIXIR ORAL ONCE
Refills: 0 | Status: COMPLETED | OUTPATIENT
Start: 2021-10-04 | End: 2021-10-04

## 2021-10-04 RX ORDER — SODIUM CHLORIDE 9 MG/ML
1000 INJECTION INTRAMUSCULAR; INTRAVENOUS; SUBCUTANEOUS ONCE
Refills: 0 | Status: COMPLETED | OUTPATIENT
Start: 2021-10-04 | End: 2021-10-04

## 2021-10-04 RX ORDER — ONDANSETRON 8 MG/1
16 TABLET, FILM COATED ORAL ONCE
Refills: 0 | Status: COMPLETED | OUTPATIENT
Start: 2021-10-04 | End: 2021-10-04

## 2021-10-04 RX ORDER — ETOPOSIDE 20 MG/ML
200 VIAL (ML) INTRAVENOUS ONCE
Refills: 0 | Status: COMPLETED | OUTPATIENT
Start: 2021-10-04 | End: 2021-10-04

## 2021-10-04 RX ADMIN — SODIUM CHLORIDE 1000 MILLILITER(S): 9 INJECTION INTRAMUSCULAR; INTRAVENOUS; SUBCUTANEOUS at 10:35

## 2021-10-04 RX ADMIN — CISPLATIN 290 MILLIGRAM(S): 1 INJECTION, SOLUTION INTRAVENOUS at 12:57

## 2021-10-04 RX ADMIN — FOSAPREPITANT DIMEGLUMINE 310 MILLIGRAM(S): 150 INJECTION, POWDER, LYOPHILIZED, FOR SOLUTION INTRAVENOUS at 11:01

## 2021-10-04 RX ADMIN — SODIUM CHLORIDE 506 MILLILITER(S): 9 INJECTION, SOLUTION INTRAVENOUS at 14:01

## 2021-10-04 RX ADMIN — FOSAPREPITANT DIMEGLUMINE 150 MILLIGRAM(S): 150 INJECTION, POWDER, LYOPHILIZED, FOR SOLUTION INTRAVENOUS at 11:32

## 2021-10-04 RX ADMIN — Medication 10 MILLIGRAM(S): at 10:45

## 2021-10-04 RX ADMIN — Medication 440 UNIT(S): at 11:40

## 2021-10-04 RX ADMIN — SODIUM CHLORIDE 1000 MILLILITER(S): 9 INJECTION, SOLUTION INTRAVENOUS at 15:53

## 2021-10-04 RX ADMIN — Medication 30 UNIT(S): at 11:52

## 2021-10-04 RX ADMIN — Medication 200 MILLIGRAM(S): at 12:55

## 2021-10-04 RX ADMIN — ONDANSETRON 16 MILLIGRAM(S): 8 TABLET, FILM COATED ORAL at 11:00

## 2021-10-04 RX ADMIN — SODIUM CHLORIDE 1000 MILLILITER(S): 9 INJECTION INTRAMUSCULAR; INTRAVENOUS; SUBCUTANEOUS at 11:34

## 2021-10-04 RX ADMIN — ONDANSETRON 232 MILLIGRAM(S): 8 TABLET, FILM COATED ORAL at 10:45

## 2021-10-04 RX ADMIN — Medication 204 MILLIGRAM(S): at 10:32

## 2021-10-04 RX ADMIN — Medication 510 MILLIGRAM(S): at 11:55

## 2021-10-04 RX ADMIN — CISPLATIN 40 MILLIGRAM(S): 1 INJECTION, SOLUTION INTRAVENOUS at 14:00

## 2021-10-04 NOTE — HISTORY OF PRESENT ILLNESS
[de-identified] : Mr. 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 Borrego), no adjuvant therapy given. He was non-compliant with surveillance. He presents to clinic today with large periaortic lymphadenopathy bx c/w recurrent seminoma (3 years out). Started C1 BEP on 9/7/21. C1D15 of bleomycin was delayed by 3-4 days d/t afebrile neutropenia. Today here for C2D1 BEP.\par \par ONC Hx:\par Mr. Noyola was diagnosed with Stage IB (pT2pNx) Seminoma in 2018 and underwent Left inguinal radical orchiectomy with Dr. Borrego on 7/6/2018 at [facility]. He presented to the ED at [facility] on 7/9/21 with Left flank pain x 1 week associated with nausea and vomiting. He had CT AP performed on 7/9/21, results showing two enlarged left periaortic soft tissue lymph nodes measuring up to 4.1 x 2.9 x 2.8 cm surrounding the proximal ROSSANA. Findings concerning for metastatic lymphadenopathy.\par \par Had cryptorchid testes, never a smoking. \par \par 6/28/2018 MRI Visceral Pelvis\par Enlargement and replacement of the left testis by an enhancing mass 5.8x4.0x3.2cm, consistent with a testicular malignancy.  \par \par 7/3/2018 CT AP at [facility]\par 1.Splenic hypodensity. Etiology uncertain. \par 2.There are 2 midly enlarged upper mesenteric lymph nodes. Significance uncertain.\par 3.Evidence of chronic pyelonephritis left kidney. Right renal cyst.\par 4.Enlarged and midly fatty liver\par 5.No significant adenopathy in the para-aortic region particularly in the region of the renal lolita. No inguinla lymph nodes\par \par 7/6/2018 Left inguinal radical orchiectomy. meidum size Coloplast 16ml testicular implant placement and plastic closure of inguinial incision with Tenzin Escobar at [facility]\par Pathology report\par -Left testicle and spermatic cord: Seminoma, 6.5cm\par -Specimen laterality : Left\par -Tumor focality:Multifocal\par -Tumor size\par Greatest dimension of main tumor mass : [id number]\par Greatest dimensions of additional tumor nodules: 1.1cm & 1cm\par -Intratubular Germ Cell Neoplasia : Intratubular seminoma \par -Seminoma :Seminoma \par -Timor extension\par Tumor invades rete testis\par Tumor invades into tunica albuginea very close to tunica vaginalis, involvement of vaginalis cannot be excluded\par -Spermatic cord margin : Uninvolved by tumor\par -Lymphovascular invasion : Present\par -Regional Lymph nodes : no lymph nodes submitted or found\par -Pathologic stage : pT2pNx\par \par 7/9/21 CT AP at [facility]\par Two enlarged left periaortic soft tissue lymph nodes measuring up to 4.1 x 2.9 x 2.8 cm surrounding the proximal ROSSANA. Findings concerning for metastatic lymphadenopathy.\par Nonspecific 1.6 cm splenic hypodensity.\par Left renal mid pole 3.5 cm cyst contains enhancing septations. Consider further evaluation with MRI as clinically warranted.\par \par 7/30/2021 CT CHEST:\par 1.  No thoracic adenopathy or metastases.\par 2.  Unchanged splenic hypodensity, not FDG avid on concurrent PET/CT, indeterminate.\par 3.  Incidental probable few left renal calyceal diverticuli. Urogram could be helpful for confirmation.\par \par 7/30/2021 PET/CT:\par - Two enlarged FDG avid para-aortic lymph nodes are suspicious for shane metastasis.\par - No additional sites of pathologic FDG uptake to suggest biologic tumor activity.\par \par 8/18/2021 Final Diagnosis\par Left retroperitoneal lymph node, core biopsy:\par - Fragments of lymph node, with minute foci suspicious for metastatic seminoma (see note).\par Addendum\par Immunostains on FNA cell block confirm Metastatic seminoma, highlighting abundant cells positive for  and  OCT 3/4, in background of lymphocytes.\par \par ct DNA 3.17(7/27/21) [de-identified] : He presents to clinic today for C2D1.  He missed PFT on 9/30 due to no covid test prior to procedure. He had some nausea/ vomiting after the Bleomycin which now resolved with compazine/zofran. Otherwise he feels fine. Denies fever, cough, SOB,  nausea/vomiting, hematuria, or headache/dizziness/vision changes.

## 2021-10-04 NOTE — REVIEW OF SYSTEMS
[Recent Change In Weight] : ~T recent weight change [Negative] : Gastrointestinal [Fever] : no fever [Chills] : no chills [Night Sweats] : no night sweats [Fatigue] : no fatigue [FreeTextEntry2] : 3 lbs weight loss since last visit

## 2021-10-04 NOTE — ASSESSMENT
[FreeTextEntry1] : Mr. 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 Borrego), no adjuvant therapy given. He was non-compliant with surveillance. He presents to clinic today with large periaortic lymphadenopathy bx c/w recurrent seminoma (3 years out). C1D15 of bleomycin was delayed d/t afebrile neutropenia. Tolerating tx well. Today here for C2D1 BEP.\par \par 6/28/2018 MRI Visceral Pelvis\par Enlargement and replacement of the left testis by an enhancing mass 5.8x4.0x3.2cm, consistent with a testicular malignancy.  \par \par 7/3/2018 CT AP at [facility]\par 1.Splenic hypodensity. Etiology uncertain. \par 2.There are 2 midly enlarged upper mesenteric lymph nodes. Significance uncertain.\par 3.Evidence of chronic pyelonephritis left kidney. Right renal cyst.\par 4.Enlarged and midly fatty liver\par 5.No significant adenopathy in the para-aortic region particularly in the region of the renal lolita. No inguinla lymph nodes\par \par 7/6/2018 Left inguinal radical orchiectomy. meidum size Coloplast 16ml testicular implant placement and plastic closure of inguinal incision with Tenzin Escobar at [facility]\par Pathology report\par -Left testicle and spermatic cord: Seminoma, 6.5cm\par -Specimen laterality : Left\par -Tumor focality:Multifocal\par -Tumor size\par Greatest dimension of main tumor mass : [id number]\par Greatest dimensions of additional tumor nodules: 1.1cm & 1cm\par -Intratubular Germ Cell Neoplasia : Intratubular seminoma \par -Seminoma :Seminoma \par -Timor extension\par Tumor invades rete testis\par Tumor invades into tunica albuginea very close to tunica vaginalis, involvement of vaginalis cannot be excluded\par -Spermatic cord margin : Uninvolved by tumor\par -Lymphovascular invasion : Present\par -Regional Lymph nodes : no lymph nodes submitted or found\par -Pathologic stage : pT2pNx\par \par 7/9/21 CT AP at [facility]\par Two enlarged left periaortic soft tissue lymph nodes measuring up to 4.1 x 2.9 x 2.8 cm surrounding the proximal ROSSANA. Findings concerning for metastatic lymphadenopathy.\par Nonspecific 1.6 cm splenic hypodensity.\par Left renal mid pole 3.5 cm cyst contains enhancing septations. Consider further evaluation with MRI as clinically warranted.\par \par 7/30/2021 CT CHEST:\par 1.  No thoracic adenopathy or metastases.\par 2.  Unchanged splenic hypodensity, not FDG avid on concurrent PET/CT, indeterminate.\par 3.  Incidental probable few left renal calyceal diverticuli. Urogram could be helpful for confirmation.\par \par 7/30/2021 PET/CT:\par - Two enlarged FDG avid para-aortic lymph nodes are suspicious for shane metastasis.\par - No additional sites of pathologic FDG uptake to suggest biologic tumor activity.\par \par 8/18/2021 Final Diagnosis\par Left retroperitoneal lymph node, core biopsy:\par - Fragments of lymph node, with minute foci suspicious for metastatic seminoma (see note).\par Addendum\par Immunostains on FNA cell block confirm Metastatic seminoma, highlighting abundant cells positive for  and  OCT 3/4, in background of lymphocytes.\par \par ct DNA 3.17(7/27/21)\par \par #) Seminoma/retroperitoneal LN/abnl scans\par - Was diagnosed in 07/2018, no adjuvant therapy given, was not compliant with active surveillance. \par - CT A/P (07/09/21): Two enlarged left periaortic lymph nodes. Larger measures 4.1 x 2.9 x 2.8 cm. Pattern of spread is very consistent with recurrent seminoma. These LNs are PET-avid. \par -ctDNA 7/27/21 detected with 3.17, and canceled FoundationOne Liquid Bx d/t insufficient cell free DNA.  With LDH elevated at 372, AFP -ve, HCG-TM 1 on 8/16/21 \par - CT Chest with contrast-no LN, PET scan as above\par - PFT testing in preparation for bleomycin therapy (done, nl 7/27/21). Missed repeat PFT on 9/30. Will reschedule ASAP.  \par - Sperm banking for fertility preservation completed\par -Patient saw Dr. Braxton for a second opinion due to rarity of disease and toxicity of chemotherapy treatment\par -will risk stratify as w/u complete, good risk (no organ involvement and LDH is < 2.5 times ULN)\par - COVID vaccine completed x 3 (Pfizer)\par - Had Urology appointment who said he was not a surgical candidate (Dr. Schoenfeld from [facility] [place])\par -Will give 3 cycles of BEP for recurrent seminoma (await liq bx results)\par -Bleomycin, etoposide, and cisplatin — For men with good-risk GCTs, suggest three cycles of standard BEP (bleomycin 30 units on days 1, 8, and 15; etoposide 100 mg/m2 on days 1 to 5; cisplatin 20 mg/m2 on days 1 to 5) administered on a 21-day cycle. This regimen of BEP is associated with an excellent survival outcome with limited toxicity\par -Started C1 BEP on 9/7/21. Delayed C1D15 was on 9/27 d/t afebrile neutropenia.  Will proceed with C2D1 today\par -Liq biopsy inconclusive, CT guided bx of LN on 8/18/21 with suspicious for metastatic seminoma\par -No neulasta given increased risk for pul toxicity with Bleomycin\par \par # CINV\par - Continue compazine/zofran as needed\par \par #Acid reflex/ Hiccup\par -Pantoprazole and chlorpromazine as needed\par \par Follow-up 1 week with D8\par  Pt presents to the ED with c/o right eye redness and nose bleed since this morning. Pt denies any trauma or injury. Pt denies cp/sob. No bleeding noted at this time.

## 2021-10-05 ENCOUNTER — OUTPATIENT (OUTPATIENT)
Dept: OUTPATIENT SERVICES | Facility: HOSPITAL | Age: 26
LOS: 1 days | End: 2021-10-05
Payer: COMMERCIAL

## 2021-10-05 ENCOUNTER — APPOINTMENT (OUTPATIENT)
Age: 26
End: 2021-10-05

## 2021-10-05 VITALS
SYSTOLIC BLOOD PRESSURE: 111 MMHG | OXYGEN SATURATION: 100 % | RESPIRATION RATE: 18 BRPM | TEMPERATURE: 97 F | HEART RATE: 73 BPM | DIASTOLIC BLOOD PRESSURE: 69 MMHG

## 2021-10-05 VITALS
HEART RATE: 82 BPM | TEMPERATURE: 97 F | SYSTOLIC BLOOD PRESSURE: 117 MMHG | WEIGHT: 194.01 LBS | RESPIRATION RATE: 18 BRPM | OXYGEN SATURATION: 99 % | DIASTOLIC BLOOD PRESSURE: 74 MMHG | HEIGHT: 71 IN

## 2021-10-05 DIAGNOSIS — C62.90 MALIGNANT NEOPLASM OF UNSPECIFIED TESTIS, UNSPECIFIED WHETHER DESCENDED OR UNDESCENDED: ICD-10-CM

## 2021-10-05 PROCEDURE — 96413 CHEMO IV INFUSION 1 HR: CPT

## 2021-10-05 PROCEDURE — 96361 HYDRATE IV INFUSION ADD-ON: CPT

## 2021-10-05 PROCEDURE — 96375 TX/PRO/DX INJ NEW DRUG ADDON: CPT

## 2021-10-05 PROCEDURE — 96417 CHEMO IV INFUS EACH ADDL SEQ: CPT

## 2021-10-05 RX ORDER — SODIUM CHLORIDE 9 MG/ML
1000 INJECTION INTRAMUSCULAR; INTRAVENOUS; SUBCUTANEOUS ONCE
Refills: 0 | Status: COMPLETED | OUTPATIENT
Start: 2021-10-05 | End: 2021-10-05

## 2021-10-05 RX ORDER — CISPLATIN 1 MG/ML
40 INJECTION, SOLUTION INTRAVENOUS ONCE
Refills: 0 | Status: COMPLETED | OUTPATIENT
Start: 2021-10-05 | End: 2021-10-05

## 2021-10-05 RX ORDER — ETOPOSIDE 20 MG/ML
200 VIAL (ML) INTRAVENOUS ONCE
Refills: 0 | Status: COMPLETED | OUTPATIENT
Start: 2021-10-05 | End: 2021-10-05

## 2021-10-05 RX ORDER — ONDANSETRON 8 MG/1
16 TABLET, FILM COATED ORAL ONCE
Refills: 0 | Status: COMPLETED | OUTPATIENT
Start: 2021-10-05 | End: 2021-10-05

## 2021-10-05 RX ORDER — DEXAMETHASONE 0.5 MG/5ML
10 ELIXIR ORAL ONCE
Refills: 0 | Status: COMPLETED | OUTPATIENT
Start: 2021-10-05 | End: 2021-10-05

## 2021-10-05 RX ADMIN — ONDANSETRON 232 MILLIGRAM(S): 8 TABLET, FILM COATED ORAL at 10:25

## 2021-10-05 RX ADMIN — SODIUM CHLORIDE 1000 MILLILITER(S): 9 INJECTION, SOLUTION INTRAVENOUS at 15:20

## 2021-10-05 RX ADMIN — SODIUM CHLORIDE 500 MILLILITER(S): 9 INJECTION, SOLUTION INTRAVENOUS at 13:35

## 2021-10-05 RX ADMIN — Medication 204 MILLIGRAM(S): at 10:10

## 2021-10-05 RX ADMIN — CISPLATIN 40 MILLIGRAM(S): 1 INJECTION, SOLUTION INTRAVENOUS at 13:35

## 2021-10-05 RX ADMIN — CISPLATIN 290 MILLIGRAM(S): 1 INJECTION, SOLUTION INTRAVENOUS at 12:35

## 2021-10-05 RX ADMIN — Medication 510 MILLIGRAM(S): at 11:32

## 2021-10-05 RX ADMIN — SODIUM CHLORIDE 1000 MILLILITER(S): 9 INJECTION INTRAMUSCULAR; INTRAVENOUS; SUBCUTANEOUS at 10:03

## 2021-10-05 RX ADMIN — Medication 200 MILLIGRAM(S): at 12:33

## 2021-10-05 RX ADMIN — ONDANSETRON 16 MILLIGRAM(S): 8 TABLET, FILM COATED ORAL at 10:40

## 2021-10-05 RX ADMIN — Medication 10 MILLIGRAM(S): at 10:25

## 2021-10-05 RX ADMIN — SODIUM CHLORIDE 1000 MILLILITER(S): 9 INJECTION INTRAMUSCULAR; INTRAVENOUS; SUBCUTANEOUS at 11:03

## 2021-10-06 ENCOUNTER — APPOINTMENT (OUTPATIENT)
Dept: PULMONOLOGY | Facility: CLINIC | Age: 26
End: 2021-10-06
Payer: COMMERCIAL

## 2021-10-06 ENCOUNTER — APPOINTMENT (OUTPATIENT)
Age: 26
End: 2021-10-06

## 2021-10-06 ENCOUNTER — OUTPATIENT (OUTPATIENT)
Dept: OUTPATIENT SERVICES | Facility: HOSPITAL | Age: 26
LOS: 1 days | End: 2021-10-06
Payer: COMMERCIAL

## 2021-10-06 VITALS
RESPIRATION RATE: 18 BRPM | DIASTOLIC BLOOD PRESSURE: 79 MMHG | HEIGHT: 71 IN | WEIGHT: 194.01 LBS | SYSTOLIC BLOOD PRESSURE: 126 MMHG | HEART RATE: 84 BPM | TEMPERATURE: 98 F | OXYGEN SATURATION: 98 %

## 2021-10-06 VITALS
TEMPERATURE: 98 F | SYSTOLIC BLOOD PRESSURE: 112 MMHG | DIASTOLIC BLOOD PRESSURE: 69 MMHG | OXYGEN SATURATION: 99 % | RESPIRATION RATE: 18 BRPM | HEART RATE: 68 BPM

## 2021-10-06 DIAGNOSIS — C62.90 MALIGNANT NEOPLASM OF UNSPECIFIED TESTIS, UNSPECIFIED WHETHER DESCENDED OR UNDESCENDED: ICD-10-CM

## 2021-10-06 PROCEDURE — 96361 HYDRATE IV INFUSION ADD-ON: CPT

## 2021-10-06 PROCEDURE — 96375 TX/PRO/DX INJ NEW DRUG ADDON: CPT

## 2021-10-06 PROCEDURE — 94729 DIFFUSING CAPACITY: CPT

## 2021-10-06 PROCEDURE — 96417 CHEMO IV INFUS EACH ADDL SEQ: CPT

## 2021-10-06 PROCEDURE — 94727 GAS DIL/WSHOT DETER LNG VOL: CPT

## 2021-10-06 PROCEDURE — 94060 EVALUATION OF WHEEZING: CPT

## 2021-10-06 PROCEDURE — 96413 CHEMO IV INFUSION 1 HR: CPT

## 2021-10-06 RX ORDER — SODIUM CHLORIDE 9 MG/ML
1000 INJECTION INTRAMUSCULAR; INTRAVENOUS; SUBCUTANEOUS ONCE
Refills: 0 | Status: COMPLETED | OUTPATIENT
Start: 2021-10-06 | End: 2021-10-06

## 2021-10-06 RX ORDER — CISPLATIN 1 MG/ML
40 INJECTION, SOLUTION INTRAVENOUS ONCE
Refills: 0 | Status: COMPLETED | OUTPATIENT
Start: 2021-10-06 | End: 2021-10-06

## 2021-10-06 RX ORDER — SODIUM CHLORIDE 9 MG/ML
1000 INJECTION, SOLUTION INTRAVENOUS
Refills: 0 | Status: COMPLETED | OUTPATIENT
Start: 2021-10-06 | End: 2021-10-06

## 2021-10-06 RX ORDER — DEXAMETHASONE 0.5 MG/5ML
10 ELIXIR ORAL ONCE
Refills: 0 | Status: COMPLETED | OUTPATIENT
Start: 2021-10-06 | End: 2021-10-06

## 2021-10-06 RX ORDER — ONDANSETRON 8 MG/1
16 TABLET, FILM COATED ORAL ONCE
Refills: 0 | Status: COMPLETED | OUTPATIENT
Start: 2021-10-06 | End: 2021-10-06

## 2021-10-06 RX ORDER — ETOPOSIDE 20 MG/ML
200 VIAL (ML) INTRAVENOUS ONCE
Refills: 0 | Status: COMPLETED | OUTPATIENT
Start: 2021-10-06 | End: 2021-10-06

## 2021-10-06 RX ADMIN — CISPLATIN 290 MILLIGRAM(S): 1 INJECTION, SOLUTION INTRAVENOUS at 10:37

## 2021-10-06 RX ADMIN — SODIUM CHLORIDE 1000 MILLILITER(S): 9 INJECTION INTRAMUSCULAR; INTRAVENOUS; SUBCUTANEOUS at 09:30

## 2021-10-06 RX ADMIN — ONDANSETRON 16 MILLIGRAM(S): 8 TABLET, FILM COATED ORAL at 09:05

## 2021-10-06 RX ADMIN — SODIUM CHLORIDE 1000 MILLILITER(S): 9 INJECTION INTRAMUSCULAR; INTRAVENOUS; SUBCUTANEOUS at 08:30

## 2021-10-06 RX ADMIN — Medication 200 MILLIGRAM(S): at 10:35

## 2021-10-06 RX ADMIN — CISPLATIN 40 MILLIGRAM(S): 1 INJECTION, SOLUTION INTRAVENOUS at 11:37

## 2021-10-06 RX ADMIN — Medication 204 MILLIGRAM(S): at 08:35

## 2021-10-06 RX ADMIN — SODIUM CHLORIDE 1000 MILLILITER(S): 9 INJECTION, SOLUTION INTRAVENOUS at 13:30

## 2021-10-06 RX ADMIN — Medication 510 MILLIGRAM(S): at 09:35

## 2021-10-06 RX ADMIN — ONDANSETRON 232 MILLIGRAM(S): 8 TABLET, FILM COATED ORAL at 08:50

## 2021-10-06 RX ADMIN — SODIUM CHLORIDE 500 MILLILITER(S): 9 INJECTION, SOLUTION INTRAVENOUS at 11:40

## 2021-10-06 RX ADMIN — Medication 10 MILLIGRAM(S): at 08:50

## 2021-10-07 ENCOUNTER — OUTPATIENT (OUTPATIENT)
Dept: OUTPATIENT SERVICES | Facility: HOSPITAL | Age: 26
LOS: 1 days | End: 2021-10-07
Payer: COMMERCIAL

## 2021-10-07 ENCOUNTER — APPOINTMENT (OUTPATIENT)
Age: 26
End: 2021-10-07

## 2021-10-07 VITALS
HEART RATE: 74 BPM | OXYGEN SATURATION: 98 % | TEMPERATURE: 98 F | DIASTOLIC BLOOD PRESSURE: 87 MMHG | SYSTOLIC BLOOD PRESSURE: 132 MMHG | RESPIRATION RATE: 18 BRPM

## 2021-10-07 VITALS
TEMPERATURE: 98 F | WEIGHT: 194.01 LBS | HEIGHT: 71 IN | OXYGEN SATURATION: 99 % | SYSTOLIC BLOOD PRESSURE: 129 MMHG | DIASTOLIC BLOOD PRESSURE: 76 MMHG | RESPIRATION RATE: 18 BRPM | HEART RATE: 76 BPM

## 2021-10-07 PROCEDURE — 96375 TX/PRO/DX INJ NEW DRUG ADDON: CPT

## 2021-10-07 PROCEDURE — 96413 CHEMO IV INFUSION 1 HR: CPT

## 2021-10-07 PROCEDURE — 96417 CHEMO IV INFUS EACH ADDL SEQ: CPT

## 2021-10-07 PROCEDURE — 96361 HYDRATE IV INFUSION ADD-ON: CPT

## 2021-10-07 RX ORDER — DEXAMETHASONE 0.5 MG/5ML
10 ELIXIR ORAL ONCE
Refills: 0 | Status: COMPLETED | OUTPATIENT
Start: 2021-10-07 | End: 2021-10-07

## 2021-10-07 RX ORDER — CISPLATIN 1 MG/ML
40 INJECTION, SOLUTION INTRAVENOUS ONCE
Refills: 0 | Status: COMPLETED | OUTPATIENT
Start: 2021-10-07 | End: 2021-10-07

## 2021-10-07 RX ORDER — ONDANSETRON 8 MG/1
16 TABLET, FILM COATED ORAL ONCE
Refills: 0 | Status: COMPLETED | OUTPATIENT
Start: 2021-10-07 | End: 2021-10-07

## 2021-10-07 RX ORDER — SODIUM CHLORIDE 9 MG/ML
1000 INJECTION, SOLUTION INTRAVENOUS
Refills: 0 | Status: COMPLETED | OUTPATIENT
Start: 2021-10-07 | End: 2021-10-07

## 2021-10-07 RX ORDER — SODIUM CHLORIDE 9 MG/ML
1000 INJECTION INTRAMUSCULAR; INTRAVENOUS; SUBCUTANEOUS ONCE
Refills: 0 | Status: COMPLETED | OUTPATIENT
Start: 2021-10-07 | End: 2021-10-07

## 2021-10-07 RX ORDER — ETOPOSIDE 20 MG/ML
200 VIAL (ML) INTRAVENOUS ONCE
Refills: 0 | Status: COMPLETED | OUTPATIENT
Start: 2021-10-07 | End: 2021-10-07

## 2021-10-07 RX ADMIN — Medication 10 MILLIGRAM(S): at 09:25

## 2021-10-07 RX ADMIN — SODIUM CHLORIDE 506 MILLILITER(S): 9 INJECTION, SOLUTION INTRAVENOUS at 12:06

## 2021-10-07 RX ADMIN — CISPLATIN 40 MILLIGRAM(S): 1 INJECTION, SOLUTION INTRAVENOUS at 12:05

## 2021-10-07 RX ADMIN — ONDANSETRON 232 MILLIGRAM(S): 8 TABLET, FILM COATED ORAL at 09:25

## 2021-10-07 RX ADMIN — SODIUM CHLORIDE 1000 MILLILITER(S): 9 INJECTION, SOLUTION INTRAVENOUS at 13:55

## 2021-10-07 RX ADMIN — SODIUM CHLORIDE 1000 MILLILITER(S): 9 INJECTION INTRAMUSCULAR; INTRAVENOUS; SUBCUTANEOUS at 09:57

## 2021-10-07 RX ADMIN — Medication 200 MILLIGRAM(S): at 11:00

## 2021-10-07 RX ADMIN — Medication 204 MILLIGRAM(S): at 09:10

## 2021-10-07 RX ADMIN — ONDANSETRON 16 MILLIGRAM(S): 8 TABLET, FILM COATED ORAL at 09:40

## 2021-10-07 RX ADMIN — SODIUM CHLORIDE 1000 MILLILITER(S): 9 INJECTION INTRAMUSCULAR; INTRAVENOUS; SUBCUTANEOUS at 08:56

## 2021-10-07 RX ADMIN — Medication 510 MILLIGRAM(S): at 10:00

## 2021-10-07 RX ADMIN — CISPLATIN 290 MILLIGRAM(S): 1 INJECTION, SOLUTION INTRAVENOUS at 11:05

## 2021-10-08 ENCOUNTER — OUTPATIENT (OUTPATIENT)
Dept: OUTPATIENT SERVICES | Facility: HOSPITAL | Age: 26
LOS: 1 days | End: 2021-10-08
Payer: COMMERCIAL

## 2021-10-08 ENCOUNTER — APPOINTMENT (OUTPATIENT)
Age: 26
End: 2021-10-08

## 2021-10-08 VITALS
DIASTOLIC BLOOD PRESSURE: 75 MMHG | RESPIRATION RATE: 18 BRPM | HEART RATE: 77 BPM | TEMPERATURE: 97 F | SYSTOLIC BLOOD PRESSURE: 126 MMHG | OXYGEN SATURATION: 99 %

## 2021-10-08 DIAGNOSIS — C62.90 MALIGNANT NEOPLASM OF UNSPECIFIED TESTIS, UNSPECIFIED WHETHER DESCENDED OR UNDESCENDED: ICD-10-CM

## 2021-10-08 PROCEDURE — 96417 CHEMO IV INFUS EACH ADDL SEQ: CPT

## 2021-10-08 PROCEDURE — 96361 HYDRATE IV INFUSION ADD-ON: CPT

## 2021-10-08 PROCEDURE — 96375 TX/PRO/DX INJ NEW DRUG ADDON: CPT

## 2021-10-08 PROCEDURE — 96413 CHEMO IV INFUSION 1 HR: CPT

## 2021-10-08 RX ORDER — CISPLATIN 1 MG/ML
40 INJECTION, SOLUTION INTRAVENOUS ONCE
Refills: 0 | Status: COMPLETED | OUTPATIENT
Start: 2021-10-08 | End: 2021-10-08

## 2021-10-08 RX ORDER — ETOPOSIDE 20 MG/ML
200 VIAL (ML) INTRAVENOUS ONCE
Refills: 0 | Status: COMPLETED | OUTPATIENT
Start: 2021-10-08 | End: 2021-10-08

## 2021-10-08 RX ORDER — SODIUM CHLORIDE 9 MG/ML
1000 INJECTION, SOLUTION INTRAVENOUS
Refills: 0 | Status: COMPLETED | OUTPATIENT
Start: 2021-10-08 | End: 2021-10-08

## 2021-10-08 RX ORDER — ONDANSETRON 8 MG/1
16 TABLET, FILM COATED ORAL ONCE
Refills: 0 | Status: COMPLETED | OUTPATIENT
Start: 2021-10-08 | End: 2021-10-08

## 2021-10-08 RX ORDER — SODIUM CHLORIDE 9 MG/ML
1000 INJECTION INTRAMUSCULAR; INTRAVENOUS; SUBCUTANEOUS ONCE
Refills: 0 | Status: COMPLETED | OUTPATIENT
Start: 2021-10-08 | End: 2021-10-08

## 2021-10-08 RX ORDER — DEXAMETHASONE 0.5 MG/5ML
10 ELIXIR ORAL ONCE
Refills: 0 | Status: COMPLETED | OUTPATIENT
Start: 2021-10-08 | End: 2021-10-08

## 2021-10-08 RX ADMIN — CISPLATIN 290 MILLIGRAM(S): 1 INJECTION, SOLUTION INTRAVENOUS at 11:25

## 2021-10-08 RX ADMIN — ONDANSETRON 232 MILLIGRAM(S): 8 TABLET, FILM COATED ORAL at 10:00

## 2021-10-08 RX ADMIN — Medication 10 MILLIGRAM(S): at 10:00

## 2021-10-08 RX ADMIN — SODIUM CHLORIDE 1000 MILLILITER(S): 9 INJECTION INTRAMUSCULAR; INTRAVENOUS; SUBCUTANEOUS at 08:45

## 2021-10-08 RX ADMIN — SODIUM CHLORIDE 506 MILLILITER(S): 9 INJECTION, SOLUTION INTRAVENOUS at 14:30

## 2021-10-08 RX ADMIN — ONDANSETRON 16 MILLIGRAM(S): 8 TABLET, FILM COATED ORAL at 10:15

## 2021-10-08 RX ADMIN — CISPLATIN 40 MILLIGRAM(S): 1 INJECTION, SOLUTION INTRAVENOUS at 12:25

## 2021-10-08 RX ADMIN — Medication 510 MILLIGRAM(S): at 10:20

## 2021-10-08 RX ADMIN — SODIUM CHLORIDE 1000 MILLILITER(S): 9 INJECTION INTRAMUSCULAR; INTRAVENOUS; SUBCUTANEOUS at 09:45

## 2021-10-08 RX ADMIN — Medication 204 MILLIGRAM(S): at 09:45

## 2021-10-08 RX ADMIN — Medication 200 MILLIGRAM(S): at 11:20

## 2021-10-08 RX ADMIN — SODIUM CHLORIDE 1000 MILLILITER(S): 9 INJECTION, SOLUTION INTRAVENOUS at 14:30

## 2021-10-11 ENCOUNTER — APPOINTMENT (OUTPATIENT)
Dept: HEMATOLOGY ONCOLOGY | Facility: CLINIC | Age: 26
End: 2021-10-11
Payer: COMMERCIAL

## 2021-10-11 ENCOUNTER — OUTPATIENT (OUTPATIENT)
Dept: OUTPATIENT SERVICES | Facility: HOSPITAL | Age: 26
LOS: 1 days | End: 2021-10-11
Payer: COMMERCIAL

## 2021-10-11 ENCOUNTER — APPOINTMENT (OUTPATIENT)
Age: 26
End: 2021-10-11

## 2021-10-11 VITALS
SYSTOLIC BLOOD PRESSURE: 114 MMHG | DIASTOLIC BLOOD PRESSURE: 73 MMHG | TEMPERATURE: 98 F | HEART RATE: 88 BPM | WEIGHT: 194.01 LBS | RESPIRATION RATE: 18 BRPM | HEIGHT: 71 IN | OXYGEN SATURATION: 98 %

## 2021-10-11 VITALS
HEART RATE: 74 BPM | DIASTOLIC BLOOD PRESSURE: 76 MMHG | TEMPERATURE: 98 F | RESPIRATION RATE: 18 BRPM | OXYGEN SATURATION: 99 % | SYSTOLIC BLOOD PRESSURE: 117 MMHG

## 2021-10-11 DIAGNOSIS — C62.90 MALIGNANT NEOPLASM OF UNSPECIFIED TESTIS, UNSPECIFIED WHETHER DESCENDED OR UNDESCENDED: ICD-10-CM

## 2021-10-11 LAB
ALBUMIN SERPL ELPH-MCNC: 3.9 G/DL — SIGNIFICANT CHANGE UP (ref 3.3–5)
ALP SERPL-CCNC: 70 U/L — SIGNIFICANT CHANGE UP (ref 40–120)
ALT FLD-CCNC: 20 U/L — SIGNIFICANT CHANGE UP (ref 10–45)
ANION GAP SERPL CALC-SCNC: 3 MMOL/L — LOW (ref 5–17)
AST SERPL-CCNC: 20 U/L — SIGNIFICANT CHANGE UP (ref 10–40)
BILIRUB SERPL-MCNC: 1.1 MG/DL — SIGNIFICANT CHANGE UP (ref 0.2–1.2)
BUN SERPL-MCNC: 21 MG/DL — SIGNIFICANT CHANGE UP (ref 7–23)
CALCIUM SERPL-MCNC: 9.6 MG/DL — SIGNIFICANT CHANGE UP (ref 8.4–10.5)
CHLORIDE SERPL-SCNC: 101 MMOL/L — SIGNIFICANT CHANGE UP (ref 96–108)
CO2 SERPL-SCNC: 30 MMOL/L — SIGNIFICANT CHANGE UP (ref 22–31)
CREAT SERPL-MCNC: 1 MG/DL — SIGNIFICANT CHANGE UP (ref 0.5–1.3)
GLUCOSE SERPL-MCNC: 97 MG/DL — SIGNIFICANT CHANGE UP (ref 70–99)
HCT VFR BLD CALC: 43.4 % — SIGNIFICANT CHANGE UP (ref 39–50)
HGB BLD-MCNC: 15.2 G/DL — SIGNIFICANT CHANGE UP (ref 13–17)
LYMPHOCYTES # BLD AUTO: 1 K/UL — SIGNIFICANT CHANGE UP (ref 1–3.3)
LYMPHOCYTES # BLD AUTO: 12.7 % — LOW (ref 13–44)
MCHC RBC-ENTMCNC: 30 PG — SIGNIFICANT CHANGE UP (ref 27–34)
MCHC RBC-ENTMCNC: 35 GM/DL — SIGNIFICANT CHANGE UP (ref 32–36)
MCV RBC AUTO: 85.6 FL — SIGNIFICANT CHANGE UP (ref 80–100)
NEUTROPHILS # BLD AUTO: 6.5 K/UL — SIGNIFICANT CHANGE UP (ref 1.8–7.4)
NEUTROPHILS NFR BLD AUTO: 86.4 % — HIGH (ref 43–77)
PLATELET # BLD AUTO: 224 K/UL — SIGNIFICANT CHANGE UP (ref 150–400)
POTASSIUM SERPL-MCNC: 4.4 MMOL/L — SIGNIFICANT CHANGE UP (ref 3.5–5.3)
POTASSIUM SERPL-SCNC: 4.4 MMOL/L — SIGNIFICANT CHANGE UP (ref 3.5–5.3)
PROT SERPL-MCNC: 6.9 G/DL — SIGNIFICANT CHANGE UP (ref 6–8.3)
RBC # BLD: 5.07 M/UL — SIGNIFICANT CHANGE UP (ref 4.2–5.8)
RBC # FLD: 13.3 % — SIGNIFICANT CHANGE UP (ref 10.3–14.5)
SODIUM SERPL-SCNC: 134 MMOL/L — LOW (ref 135–145)
WBC # BLD: 7.6 K/UL — SIGNIFICANT CHANGE UP (ref 3.8–10.5)
WBC # FLD AUTO: 7.6 K/UL — SIGNIFICANT CHANGE UP (ref 3.8–10.5)

## 2021-10-11 PROCEDURE — 85025 COMPLETE CBC W/AUTO DIFF WBC: CPT

## 2021-10-11 PROCEDURE — 80053 COMPREHEN METABOLIC PANEL: CPT

## 2021-10-11 PROCEDURE — 36415 COLL VENOUS BLD VENIPUNCTURE: CPT

## 2021-10-11 PROCEDURE — 96361 HYDRATE IV INFUSION ADD-ON: CPT

## 2021-10-11 PROCEDURE — 96375 TX/PRO/DX INJ NEW DRUG ADDON: CPT

## 2021-10-11 PROCEDURE — 99214 OFFICE O/P EST MOD 30 MIN: CPT

## 2021-10-11 PROCEDURE — 96409 CHEMO IV PUSH SNGL DRUG: CPT

## 2021-10-11 RX ORDER — ONDANSETRON 8 MG/1
8 TABLET, FILM COATED ORAL ONCE
Refills: 0 | Status: COMPLETED | OUTPATIENT
Start: 2021-10-11 | End: 2021-10-11

## 2021-10-11 RX ORDER — SODIUM CHLORIDE 9 MG/ML
250 INJECTION INTRAMUSCULAR; INTRAVENOUS; SUBCUTANEOUS ONCE
Refills: 0 | Status: COMPLETED | OUTPATIENT
Start: 2021-10-11 | End: 2021-10-11

## 2021-10-11 RX ORDER — BLEOMYCIN SULFATE 30 UNIT
30 VIAL (EA) INJECTION ONCE
Refills: 0 | Status: COMPLETED | OUTPATIENT
Start: 2021-10-11 | End: 2021-10-11

## 2021-10-11 RX ADMIN — ONDANSETRON 8 MILLIGRAM(S): 8 TABLET, FILM COATED ORAL at 10:50

## 2021-10-11 RX ADMIN — ONDANSETRON 216 MILLIGRAM(S): 8 TABLET, FILM COATED ORAL at 10:35

## 2021-10-11 RX ADMIN — Medication 440 UNIT(S): at 11:35

## 2021-10-11 RX ADMIN — Medication 30 UNIT(S): at 11:50

## 2021-10-11 RX ADMIN — SODIUM CHLORIDE 250 MILLILITER(S): 9 INJECTION INTRAMUSCULAR; INTRAVENOUS; SUBCUTANEOUS at 11:32

## 2021-10-11 RX ADMIN — SODIUM CHLORIDE 250 MILLILITER(S): 9 INJECTION INTRAMUSCULAR; INTRAVENOUS; SUBCUTANEOUS at 10:34

## 2021-10-11 RX ADMIN — Medication 300 UNIT(S): at 11:52

## 2021-10-11 NOTE — ASSESSMENT
[FreeTextEntry1] : Mr. 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 Borrego), no adjuvant therapy given. He was non-compliant with surveillance. He presents to clinic today with large periaortic lymphadenopathy bx c/w recurrent seminoma (3 years out). C1D15 of bleomycin was delayed d/t afebrile neutropenia. Today here for C2D8 BEP with complaints of nausea and vomiting. \par \par 6/28/2018 MRI Visceral Pelvis\par Enlargement and replacement of the left testis by an enhancing mass 5.8x4.0x3.2cm, consistent with a testicular malignancy.  \par \par 7/3/2018 CT AP at [facility]\par 1.Splenic hypodensity. Etiology uncertain. \par 2.There are 2 midly enlarged upper mesenteric lymph nodes. Significance uncertain.\par 3.Evidence of chronic pyelonephritis left kidney. Right renal cyst.\par 4.Enlarged and midly fatty liver\par 5.No significant adenopathy in the para-aortic region particularly in the region of the renal lolita. No inguinla lymph nodes\par \par 7/6/2018 Left inguinal radical orchiectomy. meidum size Coloplast 16ml testicular implant placement and plastic closure of inguinal incision with Tenzin Escobar at [facility]\par Pathology report\par -Left testicle and spermatic cord: Seminoma, 6.5cm\par -Specimen laterality : Left\par -Tumor focality:Multifocal\par -Tumor size\par Greatest dimension of main tumor mass : [id number]\par Greatest dimensions of additional tumor nodules: 1.1cm & 1cm\par -Intratubular Germ Cell Neoplasia : Intratubular seminoma \par -Seminoma :Seminoma \par -Timor extension\par Tumor invades rete testis\par Tumor invades into tunica albuginea very close to tunica vaginalis, involvement of vaginalis cannot be excluded\par -Spermatic cord margin : Uninvolved by tumor\par -Lymphovascular invasion : Present\par -Regional Lymph nodes : no lymph nodes submitted or found\par -Pathologic stage : pT2pNx\par \par 7/9/21 CT AP at [facility]\par Two enlarged left periaortic soft tissue lymph nodes measuring up to 4.1 x 2.9 x 2.8 cm surrounding the proximal ROSSANA. Findings concerning for metastatic lymphadenopathy.\par Nonspecific 1.6 cm splenic hypodensity.\par Left renal mid pole 3.5 cm cyst contains enhancing septations. Consider further evaluation with MRI as clinically warranted.\par \par 7/30/2021 CT CHEST:\par 1.  No thoracic adenopathy or metastases.\par 2.  Unchanged splenic hypodensity, not FDG avid on concurrent PET/CT, indeterminate.\par 3.  Incidental probable few left renal calyceal diverticuli. Urogram could be helpful for confirmation.\par \par 7/30/2021 PET/CT:\par - Two enlarged FDG avid para-aortic lymph nodes are suspicious for shane metastasis.\par - No additional sites of pathologic FDG uptake to suggest biologic tumor activity.\par \par 8/18/2021 Final Diagnosis\par Left retroperitoneal lymph node, core biopsy:\par - Fragments of lymph node, with minute foci suspicious for metastatic seminoma (see note).\par Addendum\par Immunostains on FNA cell block confirm Metastatic seminoma, highlighting abundant cells positive for  and  OCT 3/4, in background of lymphocytes.\par \par ct DNA 3.17(7/27/21)\par \par #) Seminoma/retroperitoneal LN/abnl scans\par - Was diagnosed in 07/2018, no adjuvant therapy given, was not compliant with active surveillance. \par - CT A/P (07/09/21): Two enlarged left periaortic lymph nodes. Larger measures 4.1 x 2.9 x 2.8 cm. Pattern of spread is very consistent with recurrent seminoma. These LNs are PET-avid. \par -ctDNA 7/27/21 detected with 3.17, and canceled FoundationOne Liquid Bx d/t insufficient cell free DNA.  With LDH elevated at 372, AFP -ve, HCG-TM 1 on 8/16/21.  Now , AFT <1.8, HCG TM<1 on 10/4/21. \par - CT Chest with contrast-no LN, PET scan as above\par - PFT testing in preparation for bleomycin therapy (done, nl 7/27/21). Repeat PFT on 10/6 with normal lung volume and mildly reduced diffusion capacity . Will repeat one prior to C3, which is scheduled on 10/21. \par - Sperm banking for fertility preservation completed\par -Patient saw Dr. Braxton for a second opinion due to rarity of disease and toxicity of chemotherapy treatment\par -will risk stratify as w/u complete, good risk (no organ involvement and LDH is < 2.5 times ULN)\par - COVID vaccine completed x 3 (Pfizer)\par - Had Urology appointment who said he was not a surgical candidate (Dr. Schoenfeld from [facility] [place])\par -Will give 3 cycles of BEP for recurrent seminoma (await liq bx results)\par -Bleomycin, etoposide, and cisplatin — For men with good-risk GCTs, suggest three cycles of standard BEP (bleomycin 30 units on days 1, 8, and 15; etoposide 100 mg/m2 on days 1 to 5; cisplatin 20 mg/m2 on days 1 to 5) administered on a 21-day cycle. This regimen of BEP is associated with an excellent survival outcome with limited toxicity\par -Started C1 BEP on 9/7/21. Delayed C1D15 was on 9/27 d/t afebrile neutropenia. s/p C2D1 on 10/4. Will proceed with D8 today. \par -Liq biopsy inconclusive, CT guided bx of LN on 8/18/21 with suspicious for metastatic seminoma\par -No neulasta given increased risk for pul toxicity with Bleomycin\par \par # CINV\par -Continue compazine/zofran as needed\par -Will give Zofran 8mg by IVPB at infusion unit. \par -Rx for Reglan and Ativan \par -Encourage oral fluid \par \par #Acid reflex/ Hiccup\par -Pantoprazole and chlorpromazine as needed\par \par Follow-up 1 week with D15\par

## 2021-10-11 NOTE — REVIEW OF SYSTEMS
[Negative] : Allergic/Immunologic [Fever] : no fever [Chills] : no chills [Night Sweats] : no night sweats [Recent Change In Weight] : ~T no recent weight change [Fatigue] : fatigue [Abdominal Pain] : no abdominal pain [Vomiting] : vomiting [Constipation] : no constipation [Diarrhea] : no diarrhea [FreeTextEntry2] : d [FreeTextEntry7] : nausea

## 2021-10-11 NOTE — HISTORY OF PRESENT ILLNESS
[de-identified] : Mr. 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 Borrego), no adjuvant therapy given. He was non-compliant with surveillance. He presents to clinic today with large periaortic lymphadenopathy bx c/w recurrent seminoma (3 years out). Started C1 BEP on 9/7/21. C1D15 of bleomycin was delayed by 3-4 days d/t afebrile neutropenia. Today here for C2D8 BEP with complaints of nausea/ vomiting. \par \par ONC Hx:\par Mr. Noyola was diagnosed with Stage IB (pT2pNx) Seminoma in 2018 and underwent Left inguinal radical orchiectomy with Dr. Borrego on 7/6/2018 at [facility]. He presented to the ED at [facility] on 7/9/21 with Left flank pain x 1 week associated with nausea and vomiting. He had CT AP performed on 7/9/21, results showing two enlarged left periaortic soft tissue lymph nodes measuring up to 4.1 x 2.9 x 2.8 cm surrounding the proximal ROSSANA. Findings concerning for metastatic lymphadenopathy.\par \par Had cryptorchid testes, never a smoking. \par \par 6/28/2018 MRI Visceral Pelvis\par Enlargement and replacement of the left testis by an enhancing mass 5.8x4.0x3.2cm, consistent with a testicular malignancy.  \par \par 7/3/2018 CT AP at [facility]\par 1.Splenic hypodensity. Etiology uncertain. \par 2.There are 2 midly enlarged upper mesenteric lymph nodes. Significance uncertain.\par 3.Evidence of chronic pyelonephritis left kidney. Right renal cyst.\par 4.Enlarged and midly fatty liver\par 5.No significant adenopathy in the para-aortic region particularly in the region of the renal lolita. No inguinla lymph nodes\par \par 7/6/2018 Left inguinal radical orchiectomy. meidum size Coloplast 16ml testicular implant placement and plastic closure of inguinial incision with Tenzin Escobar at [facility]\par Pathology report\par -Left testicle and spermatic cord: Seminoma, 6.5cm\par -Specimen laterality : Left\par -Tumor focality:Multifocal\par -Tumor size\par Greatest dimension of main tumor mass : [id number]\par Greatest dimensions of additional tumor nodules: 1.1cm & 1cm\par -Intratubular Germ Cell Neoplasia : Intratubular seminoma \par -Seminoma :Seminoma \par -Timor extension\par Tumor invades rete testis\par Tumor invades into tunica albuginea very close to tunica vaginalis, involvement of vaginalis cannot be excluded\par -Spermatic cord margin : Uninvolved by tumor\par -Lymphovascular invasion : Present\par -Regional Lymph nodes : no lymph nodes submitted or found\par -Pathologic stage : pT2pNx\par \par 7/9/21 CT AP at [facility]\par Two enlarged left periaortic soft tissue lymph nodes measuring up to 4.1 x 2.9 x 2.8 cm surrounding the proximal ROSSANA. Findings concerning for metastatic lymphadenopathy.\par Nonspecific 1.6 cm splenic hypodensity.\par Left renal mid pole 3.5 cm cyst contains enhancing septations. Consider further evaluation with MRI as clinically warranted.\par \par 7/30/2021 CT CHEST:\par 1.  No thoracic adenopathy or metastases.\par 2.  Unchanged splenic hypodensity, not FDG avid on concurrent PET/CT, indeterminate.\par 3.  Incidental probable few left renal calyceal diverticuli. Urogram could be helpful for confirmation.\par \par 7/30/2021 PET/CT:\par - Two enlarged FDG avid para-aortic lymph nodes are suspicious for shane metastasis.\par - No additional sites of pathologic FDG uptake to suggest biologic tumor activity.\par \par 8/18/2021 Final Diagnosis\par Left retroperitoneal lymph node, core biopsy:\par - Fragments of lymph node, with minute foci suspicious for metastatic seminoma (see note).\par Addendum\par Immunostains on FNA cell block confirm Metastatic seminoma, highlighting abundant cells positive for  and  OCT 3/4, in background of lymphocytes.\par \par ct DNA 3.17(7/27/21) [de-identified] : He presents to clinic today for C2D8. He reports nausea vomiting 3-5 times per day started over the weekend. He has been taking zofran and compazine with mild relief. He repeated PFT on 10/6 with mildly reduced diffusion capacity.  Report feeling tired and decreased appetite d/t nausea. Denies fever, cough, SOB, hematuria, or headache/dizziness/vision changes.

## 2021-10-18 ENCOUNTER — OUTPATIENT (OUTPATIENT)
Dept: OUTPATIENT SERVICES | Facility: HOSPITAL | Age: 26
LOS: 1 days | End: 2021-10-18
Payer: COMMERCIAL

## 2021-10-18 ENCOUNTER — APPOINTMENT (OUTPATIENT)
Dept: HEMATOLOGY ONCOLOGY | Facility: CLINIC | Age: 26
End: 2021-10-18
Payer: COMMERCIAL

## 2021-10-18 ENCOUNTER — APPOINTMENT (OUTPATIENT)
Age: 26
End: 2021-10-18

## 2021-10-18 ENCOUNTER — LABORATORY RESULT (OUTPATIENT)
Age: 26
End: 2021-10-18

## 2021-10-18 VITALS
WEIGHT: 191 LBS | RESPIRATION RATE: 18 BRPM | HEART RATE: 73 BPM | TEMPERATURE: 98.4 F | SYSTOLIC BLOOD PRESSURE: 119 MMHG | OXYGEN SATURATION: 100 % | DIASTOLIC BLOOD PRESSURE: 74 MMHG | BODY MASS INDEX: 27.35 KG/M2 | HEIGHT: 70 IN

## 2021-10-18 VITALS
HEART RATE: 73 BPM | TEMPERATURE: 98 F | OXYGEN SATURATION: 100 % | WEIGHT: 194.01 LBS | SYSTOLIC BLOOD PRESSURE: 119 MMHG | RESPIRATION RATE: 18 BRPM | HEIGHT: 71 IN | DIASTOLIC BLOOD PRESSURE: 74 MMHG

## 2021-10-18 DIAGNOSIS — C62.90 MALIGNANT NEOPLASM OF UNSPECIFIED TESTIS, UNSPECIFIED WHETHER DESCENDED OR UNDESCENDED: ICD-10-CM

## 2021-10-18 LAB
ALBUMIN SERPL ELPH-MCNC: 3.6 G/DL — SIGNIFICANT CHANGE UP (ref 3.3–5)
ALP SERPL-CCNC: 63 U/L — SIGNIFICANT CHANGE UP (ref 40–120)
ALT FLD-CCNC: 20 U/L — SIGNIFICANT CHANGE UP (ref 10–45)
ANION GAP SERPL CALC-SCNC: 6 MMOL/L — SIGNIFICANT CHANGE UP (ref 5–17)
AST SERPL-CCNC: 22 U/L — SIGNIFICANT CHANGE UP (ref 10–40)
BILIRUB SERPL-MCNC: 0.5 MG/DL — SIGNIFICANT CHANGE UP (ref 0.2–1.2)
BUN SERPL-MCNC: 13 MG/DL — SIGNIFICANT CHANGE UP (ref 7–23)
CALCIUM SERPL-MCNC: 9.5 MG/DL — SIGNIFICANT CHANGE UP (ref 8.4–10.5)
CHLORIDE SERPL-SCNC: 104 MMOL/L — SIGNIFICANT CHANGE UP (ref 96–108)
CO2 SERPL-SCNC: 30 MMOL/L — SIGNIFICANT CHANGE UP (ref 22–31)
CREAT SERPL-MCNC: 1 MG/DL — SIGNIFICANT CHANGE UP (ref 0.5–1.3)
GLUCOSE SERPL-MCNC: 100 MG/DL — HIGH (ref 70–99)
HCT VFR BLD CALC: 37.5 % — LOW (ref 39–50)
HGB BLD-MCNC: 12.9 G/DL — LOW (ref 13–17)
LYMPHOCYTES # BLD AUTO: 1 K/UL — SIGNIFICANT CHANGE UP (ref 1–3.3)
LYMPHOCYTES # BLD AUTO: 38.1 % — SIGNIFICANT CHANGE UP (ref 13–44)
MCHC RBC-ENTMCNC: 29.8 PG — SIGNIFICANT CHANGE UP (ref 27–34)
MCHC RBC-ENTMCNC: 34.4 GM/DL — SIGNIFICANT CHANGE UP (ref 32–36)
MCV RBC AUTO: 86.6 FL — SIGNIFICANT CHANGE UP (ref 80–100)
NEUTROPHILS # BLD AUTO: 1.2 K/UL — LOW (ref 1.8–7.4)
NEUTROPHILS NFR BLD AUTO: 49.9 % — SIGNIFICANT CHANGE UP (ref 43–77)
PLATELET # BLD AUTO: 102 K/UL — LOW (ref 150–400)
POTASSIUM SERPL-MCNC: 4.3 MMOL/L — SIGNIFICANT CHANGE UP (ref 3.5–5.3)
POTASSIUM SERPL-SCNC: 4.3 MMOL/L — SIGNIFICANT CHANGE UP (ref 3.5–5.3)
PROT SERPL-MCNC: 6.7 G/DL — SIGNIFICANT CHANGE UP (ref 6–8.3)
RBC # BLD: 4.33 M/UL — SIGNIFICANT CHANGE UP (ref 4.2–5.8)
RBC # FLD: 13.5 % — SIGNIFICANT CHANGE UP (ref 10.3–14.5)
SODIUM SERPL-SCNC: 140 MMOL/L — SIGNIFICANT CHANGE UP (ref 135–145)
WBC # BLD: 2.5 K/UL — LOW (ref 3.8–10.5)
WBC # FLD AUTO: 2.5 K/UL — LOW (ref 3.8–10.5)

## 2021-10-18 PROCEDURE — 96409 CHEMO IV PUSH SNGL DRUG: CPT

## 2021-10-18 PROCEDURE — 85025 COMPLETE CBC W/AUTO DIFF WBC: CPT

## 2021-10-18 PROCEDURE — 99215 OFFICE O/P EST HI 40 MIN: CPT

## 2021-10-18 PROCEDURE — 36415 COLL VENOUS BLD VENIPUNCTURE: CPT

## 2021-10-18 PROCEDURE — 80053 COMPREHEN METABOLIC PANEL: CPT

## 2021-10-18 RX ORDER — BLEOMYCIN SULFATE 30 UNIT
30 VIAL (EA) INJECTION ONCE
Refills: 0 | Status: COMPLETED | OUTPATIENT
Start: 2021-10-18 | End: 2021-10-18

## 2021-10-18 RX ADMIN — Medication 30 UNIT(S): at 11:52

## 2021-10-18 RX ADMIN — Medication 300 UNIT(S): at 11:56

## 2021-10-18 RX ADMIN — Medication 440 UNIT(S): at 11:35

## 2021-10-18 NOTE — ASSESSMENT
[FreeTextEntry1] : Mr. 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 Borrego), no adjuvant therapy given. He was non-compliant with surveillance. He presents to clinic today with large periaortic lymphadenopathy bx c/w recurrent seminoma (3 years out). C1D15 of bleomycin was delayed d/t afebrile G4 neutropenia by 3 days. Today here for C2D15 BEP \par \par 6/28/2018 MRI Visceral Pelvis\par Enlargement and replacement of the left testis by an enhancing mass 5.8x4.0x3.2cm, consistent with a testicular malignancy.  \par \par 7/3/2018 CT AP at [facility]\par 1.Splenic hypodensity. Etiology uncertain. \par 2.There are 2 midly enlarged upper mesenteric lymph nodes. Significance uncertain.\par 3.Evidence of chronic pyelonephritis left kidney. Right renal cyst.\par 4.Enlarged and midly fatty liver\par 5.No significant adenopathy in the para-aortic region particularly in the region of the renal lolita. No inguinla lymph nodes\par \par 7/6/2018 Left inguinal radical orchiectomy. meidum size Coloplast 16ml testicular implant placement and plastic closure of inguinal incision with Tenzin Escobar at [facility]\par Pathology report\par -Left testicle and spermatic cord: Seminoma, 6.5cm\par -Specimen laterality : Left\par -Tumor focality:Multifocal\par -Tumor size\par Greatest dimension of main tumor mass : [id number]\par Greatest dimensions of additional tumor nodules: 1.1cm & 1cm\par -Intratubular Germ Cell Neoplasia : Intratubular seminoma \par -Seminoma :Seminoma \par -Timor extension\par Tumor invades rete testis\par Tumor invades into tunica albuginea very close to tunica vaginalis, involvement of vaginalis cannot be excluded\par -Spermatic cord margin : Uninvolved by tumor\par -Lymphovascular invasion : Present\par -Regional Lymph nodes : no lymph nodes submitted or found\par -Pathologic stage : pT2pNx\par \par 7/9/21 CT AP at [facility]\par Two enlarged left periaortic soft tissue lymph nodes measuring up to 4.1 x 2.9 x 2.8 cm surrounding the proximal ROSSANA. Findings concerning for metastatic lymphadenopathy.\par Nonspecific 1.6 cm splenic hypodensity.\par Left renal mid pole 3.5 cm cyst contains enhancing septations. Consider further evaluation with MRI as clinically warranted.\par \par 7/30/2021 CT CHEST:\par 1.  No thoracic adenopathy or metastases.\par 2.  Unchanged splenic hypodensity, not FDG avid on concurrent PET/CT, indeterminate.\par 3.  Incidental probable few left renal calyceal diverticuli. Urogram could be helpful for confirmation.\par \par 7/30/2021 PET/CT:\par - Two enlarged FDG avid para-aortic lymph nodes are suspicious for shane metastasis.\par - No additional sites of pathologic FDG uptake to suggest biologic tumor activity.\par \par 8/18/2021 Final Diagnosis\par Left retroperitoneal lymph node, core biopsy:\par - Fragments of lymph node, with minute foci suspicious for metastatic seminoma (see note).\par Addendum\par Immunostains on FNA cell block confirm Metastatic seminoma, highlighting abundant cells positive for  and  OCT 3/4, in background of lymphocytes.\par \par ct DNA 3.17(7/27/21)\par \par #) Seminoma/retroperitoneal LN/abnl scans\par - Was diagnosed in 07/2018, no adjuvant therapy given, was not compliant with active surveillance. \par - CT A/P (07/09/21): Two enlarged left periaortic lymph nodes. Larger measures 4.1 x 2.9 x 2.8 cm. Pattern of spread is very consistent with recurrent seminoma. These LNs are PET-avid. \par -ctDNA 7/27/21 detected with 3.17, and canceled FoundationOne Liquid Bx d/t insufficient cell free DNA.  With LDH elevated at 372, AFP -ve, HCG-TM 1 on 8/16/21.  Now , AFT <1.8, HCG TM<1 on 10/4/21. \par - CT Chest with contrast-no LN, PET scan as above\par - PFT testing in preparation for bleomycin therapy (done, nl 7/27/21). Repeat PFT on 10/6 with normal lung volume and mildly reduced diffusion capacity . Will repeat one prior to C3, which is scheduled on 10/21/21\par - Sperm banking for fertility preservation completed\par -Patient saw Dr. Braxton for a second opinion due to rarity of disease and toxicity of chemotherapy treatment\par -will risk stratify as w/u complete, good risk (no organ involvement and LDH is < 2.5 times ULN)\par - COVID vaccine completed x 3 (Pfizer)\par - Had Urology appointment who said he was not a surgical candidate (Dr. Schoenfeld from [facility])\par -Will give 3 cycles of BEP for recurrent seminoma (await liq bx results)\par -Bleomycin, etoposide, and cisplatin — For men with good-risk GCTs, suggest three cycles of standard BEP (bleomycin 30 units on days 1, 8, and 15; etoposide 100 mg/m2 on days 1 to 5; cisplatin 20 mg/m2 on days 1 to 5) administered on a 21-day cycle. This regimen of BEP is associated with an excellent survival outcome with limited toxicity\par -Started C1 BEP on 9/7/21. Delayed C1D15 by 3-4 dayswas on 9/27 d/t afebrile G4 neutropenia. s/p C2D1 on 10/4. Will proceed with D15 today. \par -Liq biopsy inconclusive, CT guided bx of LN on 8/18/21 with suspicious for metastatic seminoma\par -No neulasta given increased risk for pul toxicity with Bleomycin\par \par # CINV\par -Continue compazine/zofran as needed\par -Will give Zofran 8mg by IVPB at infusion unit. \par -Rx for Reglan and Ativan \par -will give dex 8mg po bid x 2 days and then 4mg po bid x 2 days\par -Encourage oral fluid \par \par #Acid reflex/ Hiccup\par -Pantoprazole and chlorpromazine as needed\par \par #Hair loss\par -due to chemotherapy\par \par Follow-up 1 week with C3D1\par

## 2021-10-18 NOTE — REVIEW OF SYSTEMS
[Negative] : Allergic/Immunologic [Fatigue] : fatigue [Vomiting] : vomiting [Fever] : no fever [Chills] : no chills [Night Sweats] : no night sweats [Recent Change In Weight] : ~T no recent weight change [Abdominal Pain] : no abdominal pain [Constipation] : no constipation [Diarrhea] : no diarrhea [FreeTextEntry2] : weight gain [FreeTextEntry7] : nausea [de-identified] : hair loss

## 2021-10-18 NOTE — HISTORY OF PRESENT ILLNESS
[de-identified] : Mr. 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 Borrego), no adjuvant therapy given. He was non-compliant with surveillance. He presents to clinic today with large periaortic lymphadenopathy bx c/w recurrent seminoma (3 years out). Started C1 BEP on 9/7/21. C1D15 of bleomycin was delayed by 3-4 days d/t afebrile G4 neutropenia. Today here for C2D15 BEP. \par \par ONC Hx:\par Mr. Noyola was diagnosed with Stage IB (pT2pNx) Seminoma in 2018 and underwent Left inguinal radical orchiectomy with Dr. Borrego on 7/6/2018 at [facility]. He presented to the ED at [facility] on 7/9/21 with Left flank pain x 1 week associated with nausea and vomiting. He had CT AP performed on 7/9/21, results showing two enlarged left periaortic soft tissue lymph nodes measuring up to 4.1 x 2.9 x 2.8 cm surrounding the proximal ROSSANA. Findings concerning for metastatic lymphadenopathy.\par \par Had cryptorchid testes, never a smoking. \par \par 6/28/2018 MRI Visceral Pelvis\par Enlargement and replacement of the left testis by an enhancing mass 5.8x4.0x3.2cm, consistent with a testicular malignancy.  \par \par 7/3/2018 CT AP at [facility]\par 1.Splenic hypodensity. Etiology uncertain. \par 2.There are 2 midly enlarged upper mesenteric lymph nodes. Significance uncertain.\par 3.Evidence of chronic pyelonephritis left kidney. Right renal cyst.\par 4.Enlarged and midly fatty liver\par 5.No significant adenopathy in the para-aortic region particularly in the region of the renal lolita. No inguinla lymph nodes\par \par 7/6/2018 Left inguinal radical orchiectomy. meidum size Coloplast 16ml testicular implant placement and plastic closure of inguinial incision with Tenzin Escobar at [facility]\par Pathology report\par -Left testicle and spermatic cord: Seminoma, 6.5cm\par -Specimen laterality : Left\par -Tumor focality:Multifocal\par -Tumor size\par Greatest dimension of main tumor mass : 6.4p9q2ri\par Greatest dimensions of additional tumor nodules: 1.1cm & 1cm\par -Intratubular Germ Cell Neoplasia : Intratubular seminoma \par -Seminoma :Seminoma \par -Timor extension\par Tumor invades rete testis\par Tumor invades into tunica albuginea very close to tunica vaginalis, involvement of vaginalis cannot be excluded\par -Spermatic cord margin : Uninvolved by tumor\par -Lymphovascular invasion : Present\par -Regional Lymph nodes : no lymph nodes submitted or found\par -Pathologic stage : pT2pNx\par \par 7/9/21 CT AP at [facility]\par Two enlarged left periaortic soft tissue lymph nodes measuring up to 4.1 x 2.9 x 2.8 cm surrounding the proximal ROSSANA. Findings concerning for metastatic lymphadenopathy.\par Nonspecific 1.6 cm splenic hypodensity.\par Left renal mid pole 3.5 cm cyst contains enhancing septations. Consider further evaluation with MRI as clinically warranted.\par \par 7/30/2021 CT CHEST:\par 1.  No thoracic adenopathy or metastases.\par 2.  Unchanged splenic hypodensity, not FDG avid on concurrent PET/CT, indeterminate.\par 3.  Incidental probable few left renal calyceal diverticuli. Urogram could be helpful for confirmation.\par \par 7/30/2021 PET/CT:\par - Two enlarged FDG avid para-aortic lymph nodes are suspicious for shane metastasis.\par - No additional sites of pathologic FDG uptake to suggest biologic tumor activity.\par \par 8/18/2021 Final Diagnosis\par Left retroperitoneal lymph node, core biopsy:\par - Fragments of lymph node, with minute foci suspicious for metastatic seminoma (see note).\par Addendum\par Immunostains on FNA cell block confirm Metastatic seminoma, highlighting abundant cells positive for  and  OCT 3/4, in background of lymphocytes.\par \par ct DNA 3.17(7/27/21) [de-identified] : Patient has N/V that is fairly severe despite compazine/zofran post week 1. Has lost hair. No fatigue/SOB. Has gained weight with fluids.

## 2021-10-21 ENCOUNTER — APPOINTMENT (OUTPATIENT)
Dept: PULMONOLOGY | Facility: CLINIC | Age: 26
End: 2021-10-21
Payer: COMMERCIAL

## 2021-10-21 PROCEDURE — 94729 DIFFUSING CAPACITY: CPT

## 2021-10-21 PROCEDURE — 94060 EVALUATION OF WHEEZING: CPT

## 2021-10-21 PROCEDURE — 94727 GAS DIL/WSHOT DETER LNG VOL: CPT

## 2021-10-22 RX ORDER — SODIUM CHLORIDE 9 MG/ML
1000 INJECTION, SOLUTION INTRAVENOUS
Refills: 0 | Status: DISCONTINUED | OUTPATIENT
Start: 2021-10-28 | End: 2021-11-11

## 2021-10-22 RX ORDER — CISPLATIN 1 MG/ML
40 INJECTION, SOLUTION INTRAVENOUS ONCE
Refills: 0 | Status: COMPLETED | OUTPATIENT
Start: 2021-10-25 | End: 2021-10-25

## 2021-10-22 RX ORDER — ETOPOSIDE 20 MG/ML
200 VIAL (ML) INTRAVENOUS ONCE
Refills: 0 | Status: COMPLETED | OUTPATIENT
Start: 2021-10-25 | End: 2021-10-25

## 2021-10-22 RX ORDER — ONDANSETRON 8 MG/1
16 TABLET, FILM COATED ORAL ONCE
Refills: 0 | Status: COMPLETED | OUTPATIENT
Start: 2021-10-25 | End: 2021-10-25

## 2021-10-22 RX ORDER — FOSAPREPITANT DIMEGLUMINE 150 MG/5ML
150 INJECTION, POWDER, LYOPHILIZED, FOR SOLUTION INTRAVENOUS ONCE
Refills: 0 | Status: COMPLETED | OUTPATIENT
Start: 2021-10-25 | End: 2021-10-25

## 2021-10-22 RX ORDER — DEXAMETHASONE 0.5 MG/5ML
10 ELIXIR ORAL ONCE
Refills: 0 | Status: DISCONTINUED | OUTPATIENT
Start: 2021-10-28 | End: 2021-11-11

## 2021-10-22 RX ORDER — ONDANSETRON 8 MG/1
16 TABLET, FILM COATED ORAL ONCE
Refills: 0 | Status: DISCONTINUED | OUTPATIENT
Start: 2021-10-28 | End: 2021-11-11

## 2021-10-22 RX ORDER — DEXAMETHASONE 0.5 MG/5ML
10 ELIXIR ORAL ONCE
Refills: 0 | Status: COMPLETED | OUTPATIENT
Start: 2021-10-25 | End: 2021-10-25

## 2021-10-22 RX ORDER — SODIUM CHLORIDE 9 MG/ML
1000 INJECTION, SOLUTION INTRAVENOUS
Refills: 0 | Status: DISCONTINUED | OUTPATIENT
Start: 2021-10-29 | End: 2021-11-12

## 2021-10-22 RX ORDER — SODIUM CHLORIDE 9 MG/ML
1000 INJECTION, SOLUTION INTRAVENOUS
Refills: 0 | Status: COMPLETED | OUTPATIENT
Start: 2021-10-26 | End: 2021-10-26

## 2021-10-22 RX ORDER — BLEOMYCIN SULFATE 30 UNIT
30 VIAL (EA) INJECTION ONCE
Refills: 0 | Status: COMPLETED | OUTPATIENT
Start: 2021-10-25 | End: 2021-10-25

## 2021-10-25 ENCOUNTER — APPOINTMENT (OUTPATIENT)
Dept: HEMATOLOGY ONCOLOGY | Facility: CLINIC | Age: 26
End: 2021-10-25
Payer: COMMERCIAL

## 2021-10-25 ENCOUNTER — OUTPATIENT (OUTPATIENT)
Dept: OUTPATIENT SERVICES | Facility: HOSPITAL | Age: 26
LOS: 1 days | End: 2021-10-25
Payer: COMMERCIAL

## 2021-10-25 ENCOUNTER — APPOINTMENT (OUTPATIENT)
Age: 26
End: 2021-10-25

## 2021-10-25 VITALS
RESPIRATION RATE: 18 BRPM | HEART RATE: 65 BPM | SYSTOLIC BLOOD PRESSURE: 123 MMHG | WEIGHT: 190.04 LBS | DIASTOLIC BLOOD PRESSURE: 78 MMHG | OXYGEN SATURATION: 98 % | TEMPERATURE: 98 F | HEIGHT: 71 IN

## 2021-10-25 VITALS
HEIGHT: 70 IN | BODY MASS INDEX: 27.2 KG/M2 | TEMPERATURE: 98.4 F | RESPIRATION RATE: 18 BRPM | DIASTOLIC BLOOD PRESSURE: 78 MMHG | HEART RATE: 106 BPM | WEIGHT: 190 LBS | SYSTOLIC BLOOD PRESSURE: 123 MMHG | OXYGEN SATURATION: 98 %

## 2021-10-25 VITALS
OXYGEN SATURATION: 99 % | HEART RATE: 66 BPM | SYSTOLIC BLOOD PRESSURE: 125 MMHG | DIASTOLIC BLOOD PRESSURE: 79 MMHG | TEMPERATURE: 98 F | RESPIRATION RATE: 16 BRPM

## 2021-10-25 DIAGNOSIS — C62.90 MALIGNANT NEOPLASM OF UNSPECIFIED TESTIS, UNSPECIFIED WHETHER DESCENDED OR UNDESCENDED: ICD-10-CM

## 2021-10-25 DIAGNOSIS — T45.1X5A NAUSEA WITH VOMITING, UNSPECIFIED: ICD-10-CM

## 2021-10-25 DIAGNOSIS — H93.19 TINNITUS, UNSPECIFIED EAR: ICD-10-CM

## 2021-10-25 DIAGNOSIS — R20.0 ANESTHESIA OF SKIN: ICD-10-CM

## 2021-10-25 DIAGNOSIS — R11.2 NAUSEA WITH VOMITING, UNSPECIFIED: ICD-10-CM

## 2021-10-25 DIAGNOSIS — R94.2 ABNORMAL RESULTS OF PULMONARY FUNCTION STUDIES: ICD-10-CM

## 2021-10-25 DIAGNOSIS — R20.2 ANESTHESIA OF SKIN: ICD-10-CM

## 2021-10-25 LAB
ALBUMIN SERPL ELPH-MCNC: 3.6 G/DL — SIGNIFICANT CHANGE UP (ref 3.3–5)
ALP SERPL-CCNC: 64 U/L — SIGNIFICANT CHANGE UP (ref 40–120)
ALT FLD-CCNC: 16 U/L — SIGNIFICANT CHANGE UP (ref 10–45)
ANION GAP SERPL CALC-SCNC: 6 MMOL/L — SIGNIFICANT CHANGE UP (ref 5–17)
ANISOCYTOSIS BLD QL: SLIGHT — SIGNIFICANT CHANGE UP
AST SERPL-CCNC: 21 U/L — SIGNIFICANT CHANGE UP (ref 10–40)
BASOPHILS # BLD AUTO: 0.07 K/UL — SIGNIFICANT CHANGE UP (ref 0–0.2)
BASOPHILS NFR BLD AUTO: 2.7 % — HIGH (ref 0–2)
BILIRUB SERPL-MCNC: 0.5 MG/DL — SIGNIFICANT CHANGE UP (ref 0.2–1.2)
BUN SERPL-MCNC: 11 MG/DL — SIGNIFICANT CHANGE UP (ref 7–23)
CALCIUM SERPL-MCNC: 9.7 MG/DL — SIGNIFICANT CHANGE UP (ref 8.4–10.5)
CHLORIDE SERPL-SCNC: 102 MMOL/L — SIGNIFICANT CHANGE UP (ref 96–108)
CO2 SERPL-SCNC: 30 MMOL/L — SIGNIFICANT CHANGE UP (ref 22–31)
CREAT SERPL-MCNC: 1 MG/DL — SIGNIFICANT CHANGE UP (ref 0.5–1.3)
EOSINOPHIL # BLD AUTO: 0.34 K/UL — SIGNIFICANT CHANGE UP (ref 0–0.5)
EOSINOPHIL NFR BLD AUTO: 13.4 % — HIGH (ref 0–6)
GIANT PLATELETS BLD QL SMEAR: PRESENT — SIGNIFICANT CHANGE UP
GLUCOSE SERPL-MCNC: 96 MG/DL — SIGNIFICANT CHANGE UP (ref 70–99)
HCT VFR BLD CALC: 38.5 % — LOW (ref 39–50)
HGB BLD-MCNC: 13.2 G/DL — SIGNIFICANT CHANGE UP (ref 13–17)
LDH SERPL L TO P-CCNC: SIGNIFICANT CHANGE UP U/L (ref 50–242)
LYMPHOCYTES # BLD AUTO: 0.9 K/UL — LOW (ref 1–3.3)
LYMPHOCYTES # BLD AUTO: 35.7 % — SIGNIFICANT CHANGE UP (ref 13–44)
MACROCYTES BLD QL: SLIGHT — SIGNIFICANT CHANGE UP
MANUAL SMEAR VERIFICATION: SIGNIFICANT CHANGE UP
MCHC RBC-ENTMCNC: 29.7 PG — SIGNIFICANT CHANGE UP (ref 27–34)
MCHC RBC-ENTMCNC: 34.3 GM/DL — SIGNIFICANT CHANGE UP (ref 32–36)
MCV RBC AUTO: 86.7 FL — SIGNIFICANT CHANGE UP (ref 80–100)
MONOCYTES # BLD AUTO: 0.88 K/UL — SIGNIFICANT CHANGE UP (ref 0–0.9)
MONOCYTES NFR BLD AUTO: 34.8 % — HIGH (ref 2–14)
MYELOCYTES NFR BLD: 1.8 % — HIGH (ref 0–0)
NEUTROPHILS # BLD AUTO: 0.29 K/UL — LOW (ref 1.8–7.4)
NEUTROPHILS NFR BLD AUTO: 11.6 % — LOW (ref 43–77)
PLAT MORPH BLD: ABNORMAL
PLATELET # BLD AUTO: 190 K/UL — SIGNIFICANT CHANGE UP (ref 150–400)
POTASSIUM SERPL-MCNC: 4.2 MMOL/L — SIGNIFICANT CHANGE UP (ref 3.5–5.3)
POTASSIUM SERPL-SCNC: 4.2 MMOL/L — SIGNIFICANT CHANGE UP (ref 3.5–5.3)
PROT SERPL-MCNC: 6.6 G/DL — SIGNIFICANT CHANGE UP (ref 6–8.3)
RBC # BLD: 4.44 M/UL — SIGNIFICANT CHANGE UP (ref 4.2–5.8)
RBC # FLD: 14 % — SIGNIFICANT CHANGE UP (ref 10.3–14.5)
RBC BLD AUTO: ABNORMAL
SMUDGE CELLS # BLD: PRESENT — SIGNIFICANT CHANGE UP
SODIUM SERPL-SCNC: 138 MMOL/L — SIGNIFICANT CHANGE UP (ref 135–145)
SPHEROCYTES BLD QL SMEAR: SLIGHT — SIGNIFICANT CHANGE UP
WBC # BLD: 2.52 K/UL — LOW (ref 3.8–10.5)
WBC # FLD AUTO: 2.52 K/UL — LOW (ref 3.8–10.5)

## 2021-10-25 PROCEDURE — 84704 HCG FREE BETACHAIN TEST: CPT

## 2021-10-25 PROCEDURE — 82105 ALPHA-FETOPROTEIN SERUM: CPT

## 2021-10-25 PROCEDURE — 36415 COLL VENOUS BLD VENIPUNCTURE: CPT

## 2021-10-25 PROCEDURE — 99215 OFFICE O/P EST HI 40 MIN: CPT

## 2021-10-25 PROCEDURE — 96417 CHEMO IV INFUS EACH ADDL SEQ: CPT

## 2021-10-25 PROCEDURE — 85025 COMPLETE CBC W/AUTO DIFF WBC: CPT

## 2021-10-25 PROCEDURE — 80053 COMPREHEN METABOLIC PANEL: CPT

## 2021-10-25 PROCEDURE — 96361 HYDRATE IV INFUSION ADD-ON: CPT

## 2021-10-25 PROCEDURE — 96411 CHEMO IV PUSH ADDL DRUG: CPT

## 2021-10-25 PROCEDURE — 96413 CHEMO IV INFUSION 1 HR: CPT

## 2021-10-25 PROCEDURE — 96375 TX/PRO/DX INJ NEW DRUG ADDON: CPT

## 2021-10-25 PROCEDURE — 83615 LACTATE (LD) (LDH) ENZYME: CPT

## 2021-10-25 RX ORDER — SODIUM CHLORIDE 9 MG/ML
1000 INJECTION INTRAMUSCULAR; INTRAVENOUS; SUBCUTANEOUS ONCE
Refills: 0 | Status: COMPLETED | OUTPATIENT
Start: 2021-10-25 | End: 2021-10-25

## 2021-10-25 RX ORDER — SODIUM CHLORIDE 9 MG/ML
1000 INJECTION, SOLUTION INTRAVENOUS
Refills: 0 | Status: COMPLETED | OUTPATIENT
Start: 2021-10-25 | End: 2021-10-25

## 2021-10-25 RX ADMIN — SODIUM CHLORIDE 1000 MILLILITER(S): 9 INJECTION, SOLUTION INTRAVENOUS at 19:07

## 2021-10-25 RX ADMIN — FOSAPREPITANT DIMEGLUMINE 310 MILLIGRAM(S): 150 INJECTION, POWDER, LYOPHILIZED, FOR SOLUTION INTRAVENOUS at 14:00

## 2021-10-25 RX ADMIN — CISPLATIN 40 MILLIGRAM(S): 1 INJECTION, SOLUTION INTRAVENOUS at 17:25

## 2021-10-25 RX ADMIN — Medication 30 UNIT(S): at 15:20

## 2021-10-25 RX ADMIN — SODIUM CHLORIDE 1000 MILLILITER(S): 9 INJECTION INTRAMUSCULAR; INTRAVENOUS; SUBCUTANEOUS at 12:10

## 2021-10-25 RX ADMIN — CISPLATIN 290 MILLIGRAM(S): 1 INJECTION, SOLUTION INTRAVENOUS at 16:25

## 2021-10-25 RX ADMIN — Medication 510 MILLIGRAM(S): at 15:22

## 2021-10-25 RX ADMIN — Medication 440 UNIT(S): at 15:03

## 2021-10-25 RX ADMIN — SODIUM CHLORIDE 1000 MILLILITER(S): 9 INJECTION INTRAMUSCULAR; INTRAVENOUS; SUBCUTANEOUS at 11:10

## 2021-10-25 RX ADMIN — FOSAPREPITANT DIMEGLUMINE 150 MILLIGRAM(S): 150 INJECTION, POWDER, LYOPHILIZED, FOR SOLUTION INTRAVENOUS at 14:30

## 2021-10-25 RX ADMIN — SODIUM CHLORIDE 506 MILLILITER(S): 9 INJECTION, SOLUTION INTRAVENOUS at 17:25

## 2021-10-25 RX ADMIN — ONDANSETRON 16 MILLIGRAM(S): 8 TABLET, FILM COATED ORAL at 14:00

## 2021-10-25 RX ADMIN — Medication 10 MILLIGRAM(S): at 13:45

## 2021-10-25 RX ADMIN — Medication 204 MILLIGRAM(S): at 13:28

## 2021-10-25 RX ADMIN — ONDANSETRON 232 MILLIGRAM(S): 8 TABLET, FILM COATED ORAL at 13:45

## 2021-10-25 RX ADMIN — Medication 200 MILLIGRAM(S): at 16:22

## 2021-10-25 NOTE — ASSESSMENT
[FreeTextEntry1] : Mr. 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 Borrego), no adjuvant therapy given. He was non-compliant with surveillance. He presents to clinic today with large periaortic lymphadenopathy bx c/w recurrent seminoma (3 years out). C1D15 of bleomycin was delayed d/t afebrile G4 neutropenia by 3 days. Today here for C3D1 BEP \par \par 6/28/2018 MRI Visceral Pelvis\par Enlargement and replacement of the left testis by an enhancing mass 5.8x4.0x3.2cm, consistent with a testicular malignancy.  \par \par 7/3/2018 CT AP at [facility]\par 1.Splenic hypodensity. Etiology uncertain. \par 2.There are 2 midly enlarged upper mesenteric lymph nodes. Significance uncertain.\par 3.Evidence of chronic pyelonephritis left kidney. Right renal cyst.\par 4.Enlarged and midly fatty liver\par 5.No significant adenopathy in the para-aortic region particularly in the region of the renal lolita. No inguinla lymph nodes\par \par 7/6/2018 Left inguinal radical orchiectomy. meidum size Coloplast 16ml testicular implant placement and plastic closure of inguinal incision with Tenzin Escobar at [facility]\par Pathology report\par -Left testicle and spermatic cord: Seminoma, 6.5cm\par -Specimen laterality : Left\par -Tumor focality:Multifocal\par -Tumor size\par Greatest dimension of main tumor mass : 6.4t9u1xu\par Greatest dimensions of additional tumor nodules: 1.1cm & 1cm\par -Intratubular Germ Cell Neoplasia : Intratubular seminoma \par -Seminoma :Seminoma \par -Timor extension\par Tumor invades rete testis\par Tumor invades into tunica albuginea very close to tunica vaginalis, involvement of vaginalis cannot be excluded\par -Spermatic cord margin : Uninvolved by tumor\par -Lymphovascular invasion : Present\par -Regional Lymph nodes : no lymph nodes submitted or found\par -Pathologic stage : pT2pNx\par \par 7/9/21 CT AP at [facility]\par Two enlarged left periaortic soft tissue lymph nodes measuring up to 4.1 x 2.9 x 2.8 cm surrounding the proximal ROSSANA. Findings concerning for metastatic lymphadenopathy.\par Nonspecific 1.6 cm splenic hypodensity.\par Left renal mid pole 3.5 cm cyst contains enhancing septations. Consider further evaluation with MRI as clinically warranted.\par \par 7/30/2021 CT CHEST:\par 1.  No thoracic adenopathy or metastases.\par 2.  Unchanged splenic hypodensity, not FDG avid on concurrent PET/CT, indeterminate.\par 3.  Incidental probable few left renal calyceal diverticuli. Urogram could be helpful for confirmation.\par \par 7/30/2021 PET/CT:\par - Two enlarged FDG avid para-aortic lymph nodes are suspicious for shane metastasis.\par - No additional sites of pathologic FDG uptake to suggest biologic tumor activity.\par \par 8/18/2021 Final Diagnosis\par Left retroperitoneal lymph node, core biopsy:\par - Fragments of lymph node, with minute foci suspicious for metastatic seminoma (see note).\par Addendum\par Immunostains on FNA cell block confirm Metastatic seminoma, highlighting abundant cells positive for  and  OCT 3/4, in background of lymphocytes.\par \par ct DNA 3.17(7/27/21)\par \par #) Seminoma/retroperitoneal LN/abnl scans\par - Was diagnosed in 07/2018, no adjuvant therapy given, was not compliant with active surveillance. \par - CT A/P (07/09/21): Two enlarged left periaortic lymph nodes. Larger measures 4.1 x 2.9 x 2.8 cm. Pattern of spread is very consistent with recurrent seminoma. These LNs are PET-avid. \par -ctDNA 7/27/21 detected with 3.17, and canceled FoundationOne Liquid Bx d/t insufficient cell free DNA.  With LDH elevated at 372, AFP -ve, HCG-TM 1 on 8/16/21.  Now , AFT <1.8, HCG TM<1 on 10/4/21. \par - CT Chest with contrast-no LN, PET scan as above\par - PFT testing in preparation for bleomycin therapy (done, nl 7/27/21). Repeat PFT on 10/6 with normal lung volume and mildly reduced diffusion capacity . Repeated PFT on 10/21/21 (post C2) stable compared to PFTS post C1, less than 25% decline in DLCO compared to baseline (before C1)\par - Sperm banking for fertility preservation completed\par -Risk stratified as good risk (no organ involvement and LDH is < 2.5 times ULN)\par - COVID vaccine completed x 3 (Pfizer)\par - Had Urology appointment who said he was not a surgical candidate (Dr. Schoenfeld from [facility] [place])\par -Will give 3 cycles of BEP for recurrent seminoma (await liq bx results)\par -Bleomycin, etoposide, and cisplatin — For men with good-risk GCTs, suggest three cycles of standard BEP (bleomycin 30 units on days 1, 8, and 15; etoposide 100 mg/m2 on days 1 to 5; cisplatin 20 mg/m2 on days 1 to 5) administered on a 21-day cycle. This regimen of BEP is associated with an excellent survival outcome with limited toxicity\par -Started C1 BEP on 9/7/21. Delayed C1D15 by 3-4 dayswas on 9/27 d/t afebrile G4 neutropenia. s/p C2D1 on 10/4. 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

## 2021-10-25 NOTE — HISTORY OF PRESENT ILLNESS
[de-identified] : Mr. 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 Borrego), no adjuvant therapy given. He was non-compliant with surveillance. He presents to clinic today with large periaortic lymphadenopathy bx c/w recurrent seminoma (3 years out). Started C1 BEP on 9/7/21. C1D15 of bleomycin was delayed by 3-4 days d/t afebrile G4 neutropenia. Today here for C3D1 BEP. \par \par ONC Hx:\par Mr. Noyola was diagnosed with Stage IB (pT2pNx) Seminoma in 2018 and underwent Left inguinal radical orchiectomy with Dr. Borrego on 7/6/2018 at [facility]. He presented to the ED at [facility] on 7/9/21 with Left flank pain x 1 week associated with nausea and vomiting. He had CT AP performed on 7/9/21, results showing two enlarged left periaortic soft tissue lymph nodes measuring up to 4.1 x 2.9 x 2.8 cm surrounding the proximal ROSSANA. Findings concerning for metastatic lymphadenopathy.\par \par Had cryptorchid testes, never a smoking. \par \par 6/28/2018 MRI Visceral Pelvis\par Enlargement and replacement of the left testis by an enhancing mass 5.8x4.0x3.2cm, consistent with a testicular malignancy.  \par \par 7/3/2018 CT AP at [facility]\par 1.Splenic hypodensity. Etiology uncertain. \par 2.There are 2 midly enlarged upper mesenteric lymph nodes. Significance uncertain.\par 3.Evidence of chronic pyelonephritis left kidney. Right renal cyst.\par 4.Enlarged and midly fatty liver\par 5.No significant adenopathy in the para-aortic region particularly in the region of the renal lolita. No inguinla lymph nodes\par \par 7/6/2018 Left inguinal radical orchiectomy. meidum size Coloplast 16ml testicular implant placement and plastic closure of inguinial incision with Tenzin Escobar at [facility]\par Pathology report\par -Left testicle and spermatic cord: Seminoma, 6.5cm\par -Specimen laterality : Left\par -Tumor focality:Multifocal\par -Tumor size\par Greatest dimension of main tumor mass : 6.4i0n0je\par Greatest dimensions of additional tumor nodules: 1.1cm & 1cm\par -Intratubular Germ Cell Neoplasia : Intratubular seminoma \par -Seminoma :Seminoma \par -Timor extension\par Tumor invades rete testis\par Tumor invades into tunica albuginea very close to tunica vaginalis, involvement of vaginalis cannot be excluded\par -Spermatic cord margin : Uninvolved by tumor\par -Lymphovascular invasion : Present\par -Regional Lymph nodes : no lymph nodes submitted or found\par -Pathologic stage : pT2pNx\par \par 7/9/21 CT AP at [facility]\par Two enlarged left periaortic soft tissue lymph nodes measuring up to 4.1 x 2.9 x 2.8 cm surrounding the proximal ROSSANA. Findings concerning for metastatic lymphadenopathy.\par Nonspecific 1.6 cm splenic hypodensity.\par Left renal mid pole 3.5 cm cyst contains enhancing septations. Consider further evaluation with MRI as clinically warranted.\par \par 7/30/2021 CT CHEST:\par 1.  No thoracic adenopathy or metastases.\par 2.  Unchanged splenic hypodensity, not FDG avid on concurrent PET/CT, indeterminate.\par 3.  Incidental probable few left renal calyceal diverticuli. Urogram could be helpful for confirmation.\par \par 7/30/2021 PET/CT:\par - Two enlarged FDG avid para-aortic lymph nodes are suspicious for shane metastasis.\par - No additional sites of pathologic FDG uptake to suggest biologic tumor activity.\par \par 8/18/2021 Final Diagnosis\par Left retroperitoneal lymph node, core biopsy:\par - Fragments of lymph node, with minute foci suspicious for metastatic seminoma (see note).\par Addendum\par Immunostains on FNA cell block confirm Metastatic seminoma, highlighting abundant cells positive for  and  OCT 3/4, in background of lymphocytes.\par \par ct DNA 3.17(7/27/21) [de-identified] : Patient has N/V that is fairly severe despite compazine/zofran post week 1. Has lost hair. No fatigue/SOB. Has gained weight with fluids. Had transient T/N hands and feet and ringing in ears, now all improved.

## 2021-10-25 NOTE — REVIEW OF SYSTEMS
[Fatigue] : fatigue [Vomiting] : vomiting [Negative] : Allergic/Immunologic [Fever] : no fever [Chills] : no chills [Night Sweats] : no night sweats [Recent Change In Weight] : ~T no recent weight change [Abdominal Pain] : no abdominal pain [Constipation] : no constipation [Diarrhea] : no diarrhea [FreeTextEntry2] : weight gain [FreeTextEntry7] : nausea [de-identified] : hair loss

## 2021-10-26 ENCOUNTER — APPOINTMENT (OUTPATIENT)
Age: 26
End: 2021-10-26

## 2021-10-26 ENCOUNTER — OUTPATIENT (OUTPATIENT)
Dept: OUTPATIENT SERVICES | Facility: HOSPITAL | Age: 26
LOS: 1 days | End: 2021-10-26
Payer: COMMERCIAL

## 2021-10-26 VITALS
WEIGHT: 190.04 LBS | RESPIRATION RATE: 16 BRPM | DIASTOLIC BLOOD PRESSURE: 71 MMHG | HEIGHT: 71 IN | OXYGEN SATURATION: 98 % | TEMPERATURE: 97 F | SYSTOLIC BLOOD PRESSURE: 118 MMHG | HEART RATE: 80 BPM

## 2021-10-26 VITALS
TEMPERATURE: 97 F | RESPIRATION RATE: 16 BRPM | HEART RATE: 72 BPM | OXYGEN SATURATION: 99 % | SYSTOLIC BLOOD PRESSURE: 109 MMHG | DIASTOLIC BLOOD PRESSURE: 65 MMHG

## 2021-10-26 DIAGNOSIS — C62.90 MALIGNANT NEOPLASM OF UNSPECIFIED TESTIS, UNSPECIFIED WHETHER DESCENDED OR UNDESCENDED: ICD-10-CM

## 2021-10-26 LAB
AFP-TM SERPL-MCNC: <1.8 NG/ML — SIGNIFICANT CHANGE UP
HCG-TM SERPL-ACNC: <1 MIU/ML — SIGNIFICANT CHANGE UP

## 2021-10-26 PROCEDURE — 36415 COLL VENOUS BLD VENIPUNCTURE: CPT

## 2021-10-26 PROCEDURE — 96413 CHEMO IV INFUSION 1 HR: CPT

## 2021-10-26 PROCEDURE — 96417 CHEMO IV INFUS EACH ADDL SEQ: CPT

## 2021-10-26 PROCEDURE — 96361 HYDRATE IV INFUSION ADD-ON: CPT

## 2021-10-26 PROCEDURE — 96375 TX/PRO/DX INJ NEW DRUG ADDON: CPT

## 2021-10-26 RX ORDER — ETOPOSIDE 20 MG/ML
200 VIAL (ML) INTRAVENOUS ONCE
Refills: 0 | Status: COMPLETED | OUTPATIENT
Start: 2021-10-26 | End: 2021-10-26

## 2021-10-26 RX ORDER — CISPLATIN 1 MG/ML
40 INJECTION, SOLUTION INTRAVENOUS ONCE
Refills: 0 | Status: COMPLETED | OUTPATIENT
Start: 2021-10-26 | End: 2021-10-26

## 2021-10-26 RX ORDER — ONDANSETRON 8 MG/1
16 TABLET, FILM COATED ORAL ONCE
Refills: 0 | Status: COMPLETED | OUTPATIENT
Start: 2021-10-26 | End: 2021-10-26

## 2021-10-26 RX ORDER — DEXAMETHASONE 0.5 MG/5ML
10 ELIXIR ORAL ONCE
Refills: 0 | Status: COMPLETED | OUTPATIENT
Start: 2021-10-26 | End: 2021-10-26

## 2021-10-26 RX ORDER — SODIUM CHLORIDE 9 MG/ML
1000 INJECTION INTRAMUSCULAR; INTRAVENOUS; SUBCUTANEOUS ONCE
Refills: 0 | Status: COMPLETED | OUTPATIENT
Start: 2021-10-26 | End: 2021-10-26

## 2021-10-26 RX ADMIN — Medication 200 MILLIGRAM(S): at 12:30

## 2021-10-26 RX ADMIN — SODIUM CHLORIDE 506 MILLILITER(S): 9 INJECTION, SOLUTION INTRAVENOUS at 13:35

## 2021-10-26 RX ADMIN — SODIUM CHLORIDE 1000 MILLILITER(S): 9 INJECTION, SOLUTION INTRAVENOUS at 15:15

## 2021-10-26 RX ADMIN — SODIUM CHLORIDE 1000 MILLILITER(S): 9 INJECTION INTRAMUSCULAR; INTRAVENOUS; SUBCUTANEOUS at 11:15

## 2021-10-26 RX ADMIN — ONDANSETRON 232 MILLIGRAM(S): 8 TABLET, FILM COATED ORAL at 10:25

## 2021-10-26 RX ADMIN — Medication 204 MILLIGRAM(S): at 10:10

## 2021-10-26 RX ADMIN — SODIUM CHLORIDE 1000 MILLILITER(S): 9 INJECTION INTRAMUSCULAR; INTRAVENOUS; SUBCUTANEOUS at 10:12

## 2021-10-26 RX ADMIN — Medication 10 MILLIGRAM(S): at 10:25

## 2021-10-26 RX ADMIN — CISPLATIN 40 MILLIGRAM(S): 1 INJECTION, SOLUTION INTRAVENOUS at 13:33

## 2021-10-26 RX ADMIN — CISPLATIN 290 MILLIGRAM(S): 1 INJECTION, SOLUTION INTRAVENOUS at 12:32

## 2021-10-26 RX ADMIN — ONDANSETRON 16 MILLIGRAM(S): 8 TABLET, FILM COATED ORAL at 10:40

## 2021-10-26 RX ADMIN — Medication 510 MILLIGRAM(S): at 11:30

## 2021-10-27 ENCOUNTER — APPOINTMENT (OUTPATIENT)
Age: 26
End: 2021-10-27

## 2021-10-27 ENCOUNTER — OUTPATIENT (OUTPATIENT)
Dept: OUTPATIENT SERVICES | Facility: HOSPITAL | Age: 26
LOS: 1 days | End: 2021-10-27
Payer: COMMERCIAL

## 2021-10-27 VITALS
OXYGEN SATURATION: 98 % | SYSTOLIC BLOOD PRESSURE: 130 MMHG | HEART RATE: 76 BPM | RESPIRATION RATE: 28 BRPM | DIASTOLIC BLOOD PRESSURE: 83 MMHG | TEMPERATURE: 98 F

## 2021-10-27 VITALS
DIASTOLIC BLOOD PRESSURE: 70 MMHG | TEMPERATURE: 98 F | RESPIRATION RATE: 18 BRPM | OXYGEN SATURATION: 98 % | HEART RATE: 80 BPM | SYSTOLIC BLOOD PRESSURE: 113 MMHG

## 2021-10-27 DIAGNOSIS — C62.90 MALIGNANT NEOPLASM OF UNSPECIFIED TESTIS, UNSPECIFIED WHETHER DESCENDED OR UNDESCENDED: ICD-10-CM

## 2021-10-27 PROCEDURE — 96413 CHEMO IV INFUSION 1 HR: CPT

## 2021-10-27 PROCEDURE — 96523 IRRIG DRUG DELIVERY DEVICE: CPT

## 2021-10-27 PROCEDURE — 96375 TX/PRO/DX INJ NEW DRUG ADDON: CPT

## 2021-10-27 PROCEDURE — 96361 HYDRATE IV INFUSION ADD-ON: CPT

## 2021-10-27 PROCEDURE — 96417 CHEMO IV INFUS EACH ADDL SEQ: CPT

## 2021-10-27 RX ORDER — DEXAMETHASONE 0.5 MG/5ML
10 ELIXIR ORAL ONCE
Refills: 0 | Status: COMPLETED | OUTPATIENT
Start: 2021-10-27 | End: 2021-10-27

## 2021-10-27 RX ORDER — CISPLATIN 1 MG/ML
40 INJECTION, SOLUTION INTRAVENOUS ONCE
Refills: 0 | Status: COMPLETED | OUTPATIENT
Start: 2021-10-27 | End: 2021-10-27

## 2021-10-27 RX ORDER — ONDANSETRON 8 MG/1
16 TABLET, FILM COATED ORAL ONCE
Refills: 0 | Status: COMPLETED | OUTPATIENT
Start: 2021-10-27 | End: 2021-10-27

## 2021-10-27 RX ORDER — SODIUM CHLORIDE 9 MG/ML
1000 INJECTION INTRAMUSCULAR; INTRAVENOUS; SUBCUTANEOUS ONCE
Refills: 0 | Status: COMPLETED | OUTPATIENT
Start: 2021-10-27 | End: 2021-10-27

## 2021-10-27 RX ORDER — ETOPOSIDE 20 MG/ML
200 VIAL (ML) INTRAVENOUS ONCE
Refills: 0 | Status: COMPLETED | OUTPATIENT
Start: 2021-10-27 | End: 2021-10-27

## 2021-10-27 RX ORDER — SODIUM CHLORIDE 9 MG/ML
1000 INJECTION, SOLUTION INTRAVENOUS
Refills: 0 | Status: COMPLETED | OUTPATIENT
Start: 2021-10-27 | End: 2021-10-27

## 2021-10-27 RX ADMIN — CISPLATIN 40 MILLIGRAM(S): 1 INJECTION, SOLUTION INTRAVENOUS at 13:45

## 2021-10-27 RX ADMIN — SODIUM CHLORIDE 1000 MILLILITER(S): 9 INJECTION INTRAMUSCULAR; INTRAVENOUS; SUBCUTANEOUS at 11:44

## 2021-10-27 RX ADMIN — SODIUM CHLORIDE 506 MILLILITER(S): 9 INJECTION, SOLUTION INTRAVENOUS at 12:47

## 2021-10-27 RX ADMIN — SODIUM CHLORIDE 1000 MILLILITER(S): 9 INJECTION, SOLUTION INTRAVENOUS at 14:47

## 2021-10-27 RX ADMIN — SODIUM CHLORIDE 1000 MILLILITER(S): 9 INJECTION INTRAMUSCULAR; INTRAVENOUS; SUBCUTANEOUS at 10:17

## 2021-10-27 RX ADMIN — ONDANSETRON 16 MILLIGRAM(S): 8 TABLET, FILM COATED ORAL at 10:35

## 2021-10-27 RX ADMIN — CISPLATIN 290 MILLIGRAM(S): 1 INJECTION, SOLUTION INTRAVENOUS at 12:45

## 2021-10-27 RX ADMIN — Medication 10 MILLIGRAM(S): at 10:50

## 2021-10-27 RX ADMIN — Medication 200 MILLIGRAM(S): at 12:45

## 2021-10-27 RX ADMIN — Medication 510 MILLIGRAM(S): at 11:45

## 2021-10-27 RX ADMIN — ONDANSETRON 232 MILLIGRAM(S): 8 TABLET, FILM COATED ORAL at 10:20

## 2021-10-27 RX ADMIN — Medication 204 MILLIGRAM(S): at 10:35

## 2021-10-28 ENCOUNTER — APPOINTMENT (OUTPATIENT)
Age: 26
End: 2021-10-28

## 2021-10-28 ENCOUNTER — OUTPATIENT (OUTPATIENT)
Dept: OUTPATIENT SERVICES | Facility: HOSPITAL | Age: 26
LOS: 1 days | End: 2021-10-28
Payer: COMMERCIAL

## 2021-10-28 VITALS
RESPIRATION RATE: 16 BRPM | DIASTOLIC BLOOD PRESSURE: 78 MMHG | HEART RATE: 82 BPM | SYSTOLIC BLOOD PRESSURE: 133 MMHG | OXYGEN SATURATION: 98 % | TEMPERATURE: 97 F

## 2021-10-28 DIAGNOSIS — C62.90 MALIGNANT NEOPLASM OF UNSPECIFIED TESTIS, UNSPECIFIED WHETHER DESCENDED OR UNDESCENDED: ICD-10-CM

## 2021-10-28 PROCEDURE — 96375 TX/PRO/DX INJ NEW DRUG ADDON: CPT

## 2021-10-28 PROCEDURE — 96417 CHEMO IV INFUS EACH ADDL SEQ: CPT

## 2021-10-28 PROCEDURE — 96367 TX/PROPH/DG ADDL SEQ IV INF: CPT

## 2021-10-28 PROCEDURE — 96361 HYDRATE IV INFUSION ADD-ON: CPT

## 2021-10-28 PROCEDURE — 96413 CHEMO IV INFUSION 1 HR: CPT

## 2021-10-28 RX ORDER — ETOPOSIDE 20 MG/ML
200 VIAL (ML) INTRAVENOUS ONCE
Refills: 0 | Status: COMPLETED | OUTPATIENT
Start: 2021-10-28 | End: 2021-10-28

## 2021-10-28 RX ORDER — SODIUM CHLORIDE 9 MG/ML
1000 INJECTION INTRAMUSCULAR; INTRAVENOUS; SUBCUTANEOUS ONCE
Refills: 0 | Status: COMPLETED | OUTPATIENT
Start: 2021-10-28 | End: 2021-10-28

## 2021-10-28 RX ORDER — CISPLATIN 1 MG/ML
40 INJECTION, SOLUTION INTRAVENOUS ONCE
Refills: 0 | Status: COMPLETED | OUTPATIENT
Start: 2021-10-28 | End: 2021-10-28

## 2021-10-28 RX ADMIN — Medication 510 MILLIGRAM(S): at 11:23

## 2021-10-28 RX ADMIN — SODIUM CHLORIDE 1000 MILLILITER(S): 9 INJECTION INTRAMUSCULAR; INTRAVENOUS; SUBCUTANEOUS at 10:00

## 2021-10-28 RX ADMIN — CISPLATIN 290 MILLIGRAM(S): 1 INJECTION, SOLUTION INTRAVENOUS at 11:22

## 2021-10-29 ENCOUNTER — OUTPATIENT (OUTPATIENT)
Dept: OUTPATIENT SERVICES | Facility: HOSPITAL | Age: 26
LOS: 1 days | End: 2021-10-29
Payer: COMMERCIAL

## 2021-10-29 ENCOUNTER — APPOINTMENT (OUTPATIENT)
Age: 26
End: 2021-10-29

## 2021-10-29 VITALS
RESPIRATION RATE: 18 BRPM | SYSTOLIC BLOOD PRESSURE: 148 MMHG | HEART RATE: 80 BPM | OXYGEN SATURATION: 98 % | TEMPERATURE: 98 F | DIASTOLIC BLOOD PRESSURE: 88 MMHG

## 2021-10-29 DIAGNOSIS — C62.90 MALIGNANT NEOPLASM OF UNSPECIFIED TESTIS, UNSPECIFIED WHETHER DESCENDED OR UNDESCENDED: ICD-10-CM

## 2021-10-29 PROCEDURE — 96361 HYDRATE IV INFUSION ADD-ON: CPT

## 2021-10-29 PROCEDURE — 96375 TX/PRO/DX INJ NEW DRUG ADDON: CPT

## 2021-10-29 PROCEDURE — 96417 CHEMO IV INFUS EACH ADDL SEQ: CPT

## 2021-10-29 PROCEDURE — 96413 CHEMO IV INFUSION 1 HR: CPT

## 2021-10-29 RX ORDER — SODIUM CHLORIDE 9 MG/ML
1000 INJECTION INTRAMUSCULAR; INTRAVENOUS; SUBCUTANEOUS ONCE
Refills: 0 | Status: COMPLETED | OUTPATIENT
Start: 2021-10-29 | End: 2021-10-29

## 2021-10-29 RX ORDER — DEXAMETHASONE 0.5 MG/5ML
10 ELIXIR ORAL ONCE
Refills: 0 | Status: COMPLETED | OUTPATIENT
Start: 2021-10-29 | End: 2021-10-29

## 2021-10-29 RX ORDER — ETOPOSIDE 20 MG/ML
200 VIAL (ML) INTRAVENOUS ONCE
Refills: 0 | Status: COMPLETED | OUTPATIENT
Start: 2021-10-29 | End: 2021-10-29

## 2021-10-29 RX ORDER — ONDANSETRON 8 MG/1
16 TABLET, FILM COATED ORAL ONCE
Refills: 0 | Status: COMPLETED | OUTPATIENT
Start: 2021-10-29 | End: 2021-10-29

## 2021-10-29 RX ORDER — CISPLATIN 1 MG/ML
40 INJECTION, SOLUTION INTRAVENOUS ONCE
Refills: 0 | Status: COMPLETED | OUTPATIENT
Start: 2021-10-29 | End: 2021-10-29

## 2021-10-29 RX ADMIN — SODIUM CHLORIDE 1000 MILLILITER(S): 9 INJECTION INTRAMUSCULAR; INTRAVENOUS; SUBCUTANEOUS at 10:30

## 2021-10-29 RX ADMIN — Medication 200 MILLIGRAM(S): at 12:15

## 2021-10-29 RX ADMIN — SODIUM CHLORIDE 1000 MILLILITER(S): 9 INJECTION INTRAMUSCULAR; INTRAVENOUS; SUBCUTANEOUS at 11:30

## 2021-10-29 RX ADMIN — Medication 204 MILLIGRAM(S): at 10:30

## 2021-10-29 RX ADMIN — CISPLATIN 290 MILLIGRAM(S): 1 INJECTION, SOLUTION INTRAVENOUS at 12:20

## 2021-10-29 RX ADMIN — ONDANSETRON 16 MILLIGRAM(S): 8 TABLET, FILM COATED ORAL at 11:00

## 2021-10-29 RX ADMIN — Medication 510 MILLIGRAM(S): at 11:15

## 2021-10-29 RX ADMIN — ONDANSETRON 232 MILLIGRAM(S): 8 TABLET, FILM COATED ORAL at 10:45

## 2021-10-29 RX ADMIN — CISPLATIN 40 MILLIGRAM(S): 1 INJECTION, SOLUTION INTRAVENOUS at 13:20

## 2021-10-29 RX ADMIN — Medication 10 MILLIGRAM(S): at 10:45

## 2021-11-01 ENCOUNTER — OUTPATIENT (OUTPATIENT)
Dept: OUTPATIENT SERVICES | Facility: HOSPITAL | Age: 26
LOS: 1 days | End: 2021-11-01
Payer: COMMERCIAL

## 2021-11-01 ENCOUNTER — APPOINTMENT (OUTPATIENT)
Age: 26
End: 2021-11-01

## 2021-11-01 VITALS
TEMPERATURE: 99 F | OXYGEN SATURATION: 97 % | DIASTOLIC BLOOD PRESSURE: 71 MMHG | RESPIRATION RATE: 16 BRPM | SYSTOLIC BLOOD PRESSURE: 110 MMHG | HEART RATE: 99 BPM

## 2021-11-01 DIAGNOSIS — C62.90 MALIGNANT NEOPLASM OF UNSPECIFIED TESTIS, UNSPECIFIED WHETHER DESCENDED OR UNDESCENDED: ICD-10-CM

## 2021-11-01 LAB
ALBUMIN SERPL ELPH-MCNC: 3.6 G/DL — SIGNIFICANT CHANGE UP (ref 3.3–5)
ALP SERPL-CCNC: 61 U/L — SIGNIFICANT CHANGE UP (ref 40–120)
ALT FLD-CCNC: 16 U/L — SIGNIFICANT CHANGE UP (ref 10–45)
ANION GAP SERPL CALC-SCNC: 9 MMOL/L — SIGNIFICANT CHANGE UP (ref 5–17)
AST SERPL-CCNC: 20 U/L — SIGNIFICANT CHANGE UP (ref 10–40)
BILIRUB SERPL-MCNC: 1.1 MG/DL — SIGNIFICANT CHANGE UP (ref 0.2–1.2)
BUN SERPL-MCNC: 25 MG/DL — HIGH (ref 7–23)
CALCIUM SERPL-MCNC: 9.9 MG/DL — SIGNIFICANT CHANGE UP (ref 8.4–10.5)
CHLORIDE SERPL-SCNC: 101 MMOL/L — SIGNIFICANT CHANGE UP (ref 96–108)
CO2 SERPL-SCNC: 26 MMOL/L — SIGNIFICANT CHANGE UP (ref 22–31)
CREAT SERPL-MCNC: 1 MG/DL — SIGNIFICANT CHANGE UP (ref 0.5–1.3)
GLUCOSE SERPL-MCNC: 133 MG/DL — HIGH (ref 70–99)
HCT VFR BLD CALC: 39.9 % — SIGNIFICANT CHANGE UP (ref 39–50)
HGB BLD-MCNC: 14.2 G/DL — SIGNIFICANT CHANGE UP (ref 13–17)
LYMPHOCYTES # BLD AUTO: 0.5 K/UL — LOW (ref 1–3.3)
LYMPHOCYTES # BLD AUTO: 5.9 % — LOW (ref 13–44)
MCHC RBC-ENTMCNC: 30.1 PG — SIGNIFICANT CHANGE UP (ref 27–34)
MCHC RBC-ENTMCNC: 35.6 GM/DL — SIGNIFICANT CHANGE UP (ref 32–36)
MCV RBC AUTO: 84.5 FL — SIGNIFICANT CHANGE UP (ref 80–100)
NEUTROPHILS # BLD AUTO: 7.1 K/UL — SIGNIFICANT CHANGE UP (ref 1.8–7.4)
NEUTROPHILS NFR BLD AUTO: 93.4 % — HIGH (ref 43–77)
PLATELET # BLD AUTO: 248 K/UL — SIGNIFICANT CHANGE UP (ref 150–400)
POTASSIUM SERPL-MCNC: 4.1 MMOL/L — SIGNIFICANT CHANGE UP (ref 3.5–5.3)
POTASSIUM SERPL-SCNC: 4.1 MMOL/L — SIGNIFICANT CHANGE UP (ref 3.5–5.3)
PROT SERPL-MCNC: 7.1 G/DL — SIGNIFICANT CHANGE UP (ref 6–8.3)
RBC # BLD: 4.72 M/UL — SIGNIFICANT CHANGE UP (ref 4.2–5.8)
RBC # FLD: 13.9 % — SIGNIFICANT CHANGE UP (ref 10.3–14.5)
SODIUM SERPL-SCNC: 136 MMOL/L — SIGNIFICANT CHANGE UP (ref 135–145)
WBC # BLD: 7.7 K/UL — SIGNIFICANT CHANGE UP (ref 3.8–10.5)
WBC # FLD AUTO: 7.7 K/UL — SIGNIFICANT CHANGE UP (ref 3.8–10.5)

## 2021-11-01 PROCEDURE — 80053 COMPREHEN METABOLIC PANEL: CPT

## 2021-11-01 PROCEDURE — 85025 COMPLETE CBC W/AUTO DIFF WBC: CPT

## 2021-11-01 PROCEDURE — 36415 COLL VENOUS BLD VENIPUNCTURE: CPT

## 2021-11-01 PROCEDURE — 96409 CHEMO IV PUSH SNGL DRUG: CPT

## 2021-11-01 RX ORDER — BLEOMYCIN SULFATE 30 UNIT
30 VIAL (EA) INJECTION ONCE
Refills: 0 | Status: COMPLETED | OUTPATIENT
Start: 2021-11-01 | End: 2021-11-01

## 2021-11-01 RX ADMIN — Medication 440 UNIT(S): at 10:05

## 2021-11-01 RX ADMIN — Medication 30 UNIT(S): at 10:20

## 2021-11-08 ENCOUNTER — OUTPATIENT (OUTPATIENT)
Dept: OUTPATIENT SERVICES | Facility: HOSPITAL | Age: 26
LOS: 1 days | End: 2021-11-08
Payer: COMMERCIAL

## 2021-11-08 ENCOUNTER — APPOINTMENT (OUTPATIENT)
Dept: HEMATOLOGY ONCOLOGY | Facility: CLINIC | Age: 26
End: 2021-11-08
Payer: COMMERCIAL

## 2021-11-08 ENCOUNTER — APPOINTMENT (OUTPATIENT)
Age: 26
End: 2021-11-08

## 2021-11-08 VITALS
WEIGHT: 193 LBS | HEIGHT: 70 IN | DIASTOLIC BLOOD PRESSURE: 76 MMHG | SYSTOLIC BLOOD PRESSURE: 114 MMHG | OXYGEN SATURATION: 100 % | BODY MASS INDEX: 27.63 KG/M2 | RESPIRATION RATE: 18 BRPM | HEART RATE: 107 BPM | TEMPERATURE: 98.2 F

## 2021-11-08 VITALS
RESPIRATION RATE: 16 BRPM | DIASTOLIC BLOOD PRESSURE: 76 MMHG | SYSTOLIC BLOOD PRESSURE: 114 MMHG | HEART RATE: 107 BPM | OXYGEN SATURATION: 99 % | TEMPERATURE: 98 F

## 2021-11-08 DIAGNOSIS — T50.905A OTHER SPECIFIED NONSCARRING HAIR LOSS: ICD-10-CM

## 2021-11-08 DIAGNOSIS — T45.1X5A OTHER FATIGUE: ICD-10-CM

## 2021-11-08 DIAGNOSIS — L65.8 OTHER SPECIFIED NONSCARRING HAIR LOSS: ICD-10-CM

## 2021-11-08 DIAGNOSIS — K21.9 GASTRO-ESOPHAGEAL REFLUX DISEASE W/OUT ESOPHAGITIS: ICD-10-CM

## 2021-11-08 DIAGNOSIS — C62.90 MALIGNANT NEOPLASM OF UNSPECIFIED TESTIS, UNSPECIFIED WHETHER DESCENDED OR UNDESCENDED: ICD-10-CM

## 2021-11-08 DIAGNOSIS — R53.83 OTHER FATIGUE: ICD-10-CM

## 2021-11-08 DIAGNOSIS — Z09 ENCOUNTER FOR FOLLOW-UP EXAMINATION AFTER COMPLETED TREATMENT FOR CONDITIONS OTHER THAN MALIGNANT NEOPLASM: ICD-10-CM

## 2021-11-08 LAB
ALBUMIN SERPL ELPH-MCNC: 3.4 G/DL — SIGNIFICANT CHANGE UP (ref 3.3–5)
ALP SERPL-CCNC: 65 U/L — SIGNIFICANT CHANGE UP (ref 40–120)
ALT FLD-CCNC: 21 U/L — SIGNIFICANT CHANGE UP (ref 10–45)
ANION GAP SERPL CALC-SCNC: 9 MMOL/L — SIGNIFICANT CHANGE UP (ref 5–17)
ANISOCYTOSIS BLD QL: SLIGHT — SIGNIFICANT CHANGE UP
AST SERPL-CCNC: 29 U/L — SIGNIFICANT CHANGE UP (ref 10–40)
BASOPHILS # BLD AUTO: 0.09 K/UL — SIGNIFICANT CHANGE UP (ref 0–0.2)
BASOPHILS NFR BLD AUTO: 6.4 % — HIGH (ref 0–2)
BILIRUB SERPL-MCNC: 0.4 MG/DL — SIGNIFICANT CHANGE UP (ref 0.2–1.2)
BUN SERPL-MCNC: 10 MG/DL — SIGNIFICANT CHANGE UP (ref 7–23)
CALCIUM SERPL-MCNC: 9.5 MG/DL — SIGNIFICANT CHANGE UP (ref 8.4–10.5)
CHLORIDE SERPL-SCNC: 105 MMOL/L — SIGNIFICANT CHANGE UP (ref 96–108)
CO2 SERPL-SCNC: 29 MMOL/L — SIGNIFICANT CHANGE UP (ref 22–31)
CREAT SERPL-MCNC: 0.9 MG/DL — SIGNIFICANT CHANGE UP (ref 0.5–1.3)
EOSINOPHIL # BLD AUTO: 0 K/UL — SIGNIFICANT CHANGE UP (ref 0–0.5)
EOSINOPHIL NFR BLD AUTO: 0 % — SIGNIFICANT CHANGE UP (ref 0–6)
GLUCOSE SERPL-MCNC: 125 MG/DL — HIGH (ref 70–99)
HCT VFR BLD CALC: 35.3 % — LOW (ref 39–50)
HGB BLD-MCNC: 12 G/DL — LOW (ref 13–17)
LDH SERPL L TO P-CCNC: 222 U/L — SIGNIFICANT CHANGE UP (ref 50–242)
LYMPHOCYTES # BLD AUTO: 0.62 K/UL — LOW (ref 1–3.3)
LYMPHOCYTES # BLD AUTO: 45.4 % — HIGH (ref 13–44)
MACROCYTES BLD QL: SLIGHT — SIGNIFICANT CHANGE UP
MANUAL SMEAR VERIFICATION: SIGNIFICANT CHANGE UP
MCHC RBC-ENTMCNC: 30.1 PG — SIGNIFICANT CHANGE UP (ref 27–34)
MCHC RBC-ENTMCNC: 34 GM/DL — SIGNIFICANT CHANGE UP (ref 32–36)
MCV RBC AUTO: 88.5 FL — SIGNIFICANT CHANGE UP (ref 80–100)
MICROCYTES BLD QL: SLIGHT — SIGNIFICANT CHANGE UP
MONOCYTES # BLD AUTO: 0.01 K/UL — SIGNIFICANT CHANGE UP (ref 0–0.9)
MONOCYTES NFR BLD AUTO: 0.9 % — LOW (ref 2–14)
NEUTROPHILS # BLD AUTO: 0.64 K/UL — LOW (ref 1.8–7.4)
NEUTROPHILS NFR BLD AUTO: 47.3 % — SIGNIFICANT CHANGE UP (ref 43–77)
OVALOCYTES BLD QL SMEAR: SLIGHT — SIGNIFICANT CHANGE UP
PLAT MORPH BLD: ABNORMAL
PLATELET # BLD AUTO: 100 K/UL — LOW (ref 150–400)
POIKILOCYTOSIS BLD QL AUTO: SLIGHT — SIGNIFICANT CHANGE UP
POLYCHROMASIA BLD QL SMEAR: SLIGHT — SIGNIFICANT CHANGE UP
POTASSIUM SERPL-MCNC: 4.1 MMOL/L — SIGNIFICANT CHANGE UP (ref 3.5–5.3)
POTASSIUM SERPL-SCNC: 4.1 MMOL/L — SIGNIFICANT CHANGE UP (ref 3.5–5.3)
PROT SERPL-MCNC: 6.6 G/DL — SIGNIFICANT CHANGE UP (ref 6–8.3)
RBC # BLD: 3.99 M/UL — LOW (ref 4.2–5.8)
RBC # FLD: 14.1 % — SIGNIFICANT CHANGE UP (ref 10.3–14.5)
RBC BLD AUTO: ABNORMAL
SMUDGE CELLS # BLD: PRESENT — SIGNIFICANT CHANGE UP
SODIUM SERPL-SCNC: 143 MMOL/L — SIGNIFICANT CHANGE UP (ref 135–145)
SPHEROCYTES BLD QL SMEAR: SLIGHT — SIGNIFICANT CHANGE UP
WBC # BLD: 1.36 K/UL — LOW (ref 3.8–10.5)
WBC # FLD AUTO: 1.36 K/UL — LOW (ref 3.8–10.5)

## 2021-11-08 PROCEDURE — 80053 COMPREHEN METABOLIC PANEL: CPT

## 2021-11-08 PROCEDURE — 83615 LACTATE (LD) (LDH) ENZYME: CPT

## 2021-11-08 PROCEDURE — 99214 OFFICE O/P EST MOD 30 MIN: CPT

## 2021-11-08 PROCEDURE — 96409 CHEMO IV PUSH SNGL DRUG: CPT

## 2021-11-08 PROCEDURE — 36415 COLL VENOUS BLD VENIPUNCTURE: CPT

## 2021-11-08 PROCEDURE — 85025 COMPLETE CBC W/AUTO DIFF WBC: CPT

## 2021-11-08 RX ORDER — BLEOMYCIN SULFATE 30 UNIT
30 VIAL (EA) INJECTION ONCE
Refills: 0 | Status: COMPLETED | OUTPATIENT
Start: 2021-11-08 | End: 2021-11-08

## 2021-11-08 RX ORDER — CHLORPROMAZINE HYDROCHLORIDE 25 MG/1
25 TABLET, SUGAR COATED ORAL 3 TIMES DAILY
Qty: 60 | Refills: 0 | Status: DISCONTINUED | COMMUNITY
Start: 2021-09-09 | End: 2021-11-08

## 2021-11-08 RX ORDER — LORAZEPAM 0.5 MG/1
0.5 TABLET ORAL
Qty: 30 | Refills: 0 | Status: DISCONTINUED | COMMUNITY
Start: 2021-10-11 | End: 2021-11-08

## 2021-11-08 RX ORDER — DEXAMETHASONE 4 MG/1
4 TABLET ORAL
Qty: 12 | Refills: 1 | Status: DISCONTINUED | COMMUNITY
Start: 2021-10-18 | End: 2021-11-08

## 2021-11-08 RX ADMIN — Medication 30 UNIT(S): at 12:20

## 2021-11-08 RX ADMIN — Medication 440 UNIT(S): at 12:05

## 2021-11-08 NOTE — REVIEW OF SYSTEMS
[Negative] : Allergic/Immunologic [Fatigue] : fatigue [Fever] : no fever [Chills] : no chills [Night Sweats] : no night sweats [Recent Change In Weight] : ~T no recent weight change [Abdominal Pain] : no abdominal pain [Vomiting] : no vomiting [Constipation] : no constipation [Diarrhea] : no diarrhea [FreeTextEntry4] : runny nose  [de-identified] : hair loss, discoloration on nails

## 2021-11-08 NOTE — PHYSICAL EXAM
[Restricted in physically strenuous activity but ambulatory and able to carry out work of a light or sedentary nature] : Status 1- Restricted in physically strenuous activity but ambulatory and able to carry out work of a light or sedentary nature, e.g., light house work, office work [Normal] : affect appropriate [de-identified] : hyperpigmentation on nails

## 2021-11-08 NOTE — HISTORY OF PRESENT ILLNESS
[de-identified] : Mr. 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 Borrego), no adjuvant therapy given. He was non-compliant with surveillance. He presents to clinic today with large periaortic lymphadenopathy bx c/w recurrent seminoma (3 years out). Started C1 BEP on 9/7/21. C1D15 of bleomycin was delayed by 3-4 days d/t afebrile G4 neutropenia. Today here for C3D15 BEP. Tolerating tx well. \par \par ONC Hx:\par Mr. Noyola was diagnosed with Stage IB (pT2pNx) Seminoma in 2018 and underwent Left inguinal radical orchiectomy with Dr. Borrego on 7/6/2018 at [facility]. He presented to the ED at [facility] on 7/9/21 with Left flank pain x 1 week associated with nausea and vomiting. He had CT AP performed on 7/9/21, results showing two enlarged left periaortic soft tissue lymph nodes measuring up to 4.1 x 2.9 x 2.8 cm surrounding the proximal ROSSANA. Findings concerning for metastatic lymphadenopathy.\par \par Had cryptorchid testes, never a smoking. \par \par 6/28/2018 MRI Visceral Pelvis\par Enlargement and replacement of the left testis by an enhancing mass 5.8x4.0x3.2cm, consistent with a testicular malignancy.  \par \par 7/3/2018 CT AP at [facility]\par 1.Splenic hypodensity. Etiology uncertain. \par 2.There are 2 midly enlarged upper mesenteric lymph nodes. Significance uncertain.\par 3.Evidence of chronic pyelonephritis left kidney. Right renal cyst.\par 4.Enlarged and midly fatty liver\par 5.No significant adenopathy in the para-aortic region particularly in the region of the renal lolita. No inguinla lymph nodes\par \par 7/6/2018 Left inguinal radical orchiectomy. meidum size Coloplast 16ml testicular implant placement and plastic closure of inguinial incision with Tenzin Escobar at [facility]\par Pathology report\par -Left testicle and spermatic cord: Seminoma, 6.5cm\par -Specimen laterality : Left\par -Tumor focality:Multifocal\par -Tumor size\par Greatest dimension of main tumor mass : 6.7n3v4pb\par Greatest dimensions of additional tumor nodules: 1.1cm & 1cm\par -Intratubular Germ Cell Neoplasia : Intratubular seminoma \par -Seminoma :Seminoma \par -Timor extension\par Tumor invades rete testis\par Tumor invades into tunica albuginea very close to tunica vaginalis, involvement of vaginalis cannot be excluded\par -Spermatic cord margin : Uninvolved by tumor\par -Lymphovascular invasion : Present\par -Regional Lymph nodes : no lymph nodes submitted or found\par -Pathologic stage : pT2pNx\par \par 7/9/21 CT AP at [facility]\par Two enlarged left periaortic soft tissue lymph nodes measuring up to 4.1 x 2.9 x 2.8 cm surrounding the proximal ROSSANA. Findings concerning for metastatic lymphadenopathy.\par Nonspecific 1.6 cm splenic hypodensity.\par Left renal mid pole 3.5 cm cyst contains enhancing septations. Consider further evaluation with MRI as clinically warranted.\par \par 7/30/2021 CT CHEST:\par 1.  No thoracic adenopathy or metastases.\par 2.  Unchanged splenic hypodensity, not FDG avid on concurrent PET/CT, indeterminate.\par 3.  Incidental probable few left renal calyceal diverticuli. Urogram could be helpful for confirmation.\par \par 7/30/2021 PET/CT:\par - Two enlarged FDG avid para-aortic lymph nodes are suspicious for shane metastasis.\par - No additional sites of pathologic FDG uptake to suggest biologic tumor activity.\par \par 8/18/2021 Final Diagnosis\par Left retroperitoneal lymph node, core biopsy:\par - Fragments of lymph node, with minute foci suspicious for metastatic seminoma (see note).\par Addendum\par Immunostains on FNA cell block confirm Metastatic seminoma, highlighting abundant cells positive for  and  OCT 3/4, in background of lymphocytes.\par \par ct DNA 3.17(7/27/21) [de-identified] : Patient presents to clinic today for C3D15 today. He is feeling good and appetite is fine. Reports interment runny nose but denies any fever, cough, fatigue/SOB,  Transient T/N hands and feet and ringing in ears resolved. Notes dislocation on nails.

## 2021-11-08 NOTE — ASSESSMENT
[FreeTextEntry1] : Mr. 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 Borrego), no adjuvant therapy given. He was non-compliant with surveillance. He presents to clinic today with large periaortic lymphadenopathy bx c/w recurrent seminoma (3 years out). C1D15 of bleomycin was delayed d/t afebrile G4 neutropenia by 3 days. Today here for C3D15 BEP. Feeling great. \par \par 6/28/2018 MRI Visceral Pelvis\par Enlargement and replacement of the left testis by an enhancing mass 5.8x4.0x3.2cm, consistent with a testicular malignancy.  \par \par 7/3/2018 CT AP at [facility]\par 1.Splenic hypodensity. Etiology uncertain. \par 2.There are 2 midly enlarged upper mesenteric lymph nodes. Significance uncertain.\par 3.Evidence of chronic pyelonephritis left kidney. Right renal cyst.\par 4.Enlarged and midly fatty liver\par 5.No significant adenopathy in the para-aortic region particularly in the region of the renal lolita. No inguinla lymph nodes\par \par 7/6/2018 Left inguinal radical orchiectomy. meidum size Coloplast 16ml testicular implant placement and plastic closure of inguinal incision with Tenzin Escobar at [facility]\par Pathology report\par -Left testicle and spermatic cord: Seminoma, 6.5cm\par -Specimen laterality : Left\par -Tumor focality:Multifocal\par -Tumor size\par Greatest dimension of main tumor mass : 6.2s5b2kp\par Greatest dimensions of additional tumor nodules: 1.1cm & 1cm\par -Intratubular Germ Cell Neoplasia : Intratubular seminoma \par -Seminoma :Seminoma \par -Timor extension\par Tumor invades rete testis\par Tumor invades into tunica albuginea very close to tunica vaginalis, involvement of vaginalis cannot be excluded\par -Spermatic cord margin : Uninvolved by tumor\par -Lymphovascular invasion : Present\par -Regional Lymph nodes : no lymph nodes submitted or found\par -Pathologic stage : pT2pNx\par \par 7/9/21 CT AP at [facility]\par Two enlarged left periaortic soft tissue lymph nodes measuring up to 4.1 x 2.9 x 2.8 cm surrounding the proximal ROSSANA. Findings concerning for metastatic lymphadenopathy.\par Nonspecific 1.6 cm splenic hypodensity.\par Left renal mid pole 3.5 cm cyst contains enhancing septations. Consider further evaluation with MRI as clinically warranted.\par \par 7/30/2021 CT CHEST:\par 1.  No thoracic adenopathy or metastases.\par 2.  Unchanged splenic hypodensity, not FDG avid on concurrent PET/CT, indeterminate.\par 3.  Incidental probable few left renal calyceal diverticuli. Urogram could be helpful for confirmation.\par \par 7/30/2021 PET/CT:\par - Two enlarged FDG avid para-aortic lymph nodes are suspicious for shane metastasis.\par - No additional sites of pathologic FDG uptake to suggest biologic tumor activity.\par \par 8/18/2021 Final Diagnosis\par Left retroperitoneal lymph node, core biopsy:\par - Fragments of lymph node, with minute foci suspicious for metastatic seminoma (see note).\par Addendum\par Immunostains on FNA cell block confirm Metastatic seminoma, highlighting abundant cells positive for  and  OCT 3/4, in background of lymphocytes.\par \par ct DNA 3.17(7/27/21)\par \par #) Seminoma/retroperitoneal LN/abnl scans\par - Was diagnosed in 07/2018, no adjuvant therapy given, was not compliant with active surveillance. \par - CT A/P (07/09/21): Two enlarged left periaortic lymph nodes. Larger measures 4.1 x 2.9 x 2.8 cm. Pattern of spread is very consistent with recurrent seminoma. These LNs are PET-avid. \par -ctDNA 7/27/21 detected with 3.17, and canceled Foundation[employer] Liquid Bx d/t insufficient cell free DNA.  With LDH elevated at 372, AFP -ve, HCG-TM 1 on 8/16/21.  Now , AFT <1.8, HCG TM<1 on 10/25/21. \par - CT Chest with contrast-no LN, PET scan as above\par - PFT testing in preparation for bleomycin therapy (done, nl 7/27/21). Repeat PFT on 10/6 with normal lung volume and mildly reduced diffusion capacity . Repeated PFT on 10/21/21 (post C2) stable compared to PFTS post C1, less than 25% decline in DLCO compared to baseline (before C1)\par - Sperm banking for fertility preservation completed\par -Risk stratified as good risk (no organ involvement and LDH is < 2.5 times ULN)\par - COVID vaccine completed x 3 (Pfizer)\par - Had Urology appointment who said he was not a surgical candidate [place] [facility] [place]par -Will give 3 cycles of BEP for recurrent seminoma\par -Bleomycin, etoposide, and cisplatin — For men with good-risk GCTs, suggest three cycles of standard BEP (bleomycin 30 units on days 1, 8, and 15; etoposide 100 mg/m2 on days 1 to 5; cisplatin 20 mg/m2 on days 1 to 5) administered on a 21-day cycle. This regimen of BEP is associated with an excellent survival outcome with limited toxicity\par -Started C1 BEP on 9/7/21. Delayed C1D15 by 3-4 days was on 9/27 d/t afebrile G4 neutropenia. s/p C2D1 on 10/4. C3 on 10/25/21.  Tolerating tx well. Will proceed with C3D15 BEP today\par -Liq biopsy inconclusive, CT guided bx of LN on 8/18/21 with suspicious for metastatic seminoma\par -No neulasta given increased risk for pul toxicity with Bleomycin\par -Will repeat PFT and ct DNA on 11/23 and CT CAP on 12/6. \par -Port flush monthly\par \par # CINV - Resolved\par -Continue compazine/zofran as needed\par -on Fosaprepitant and aloxi\par -Completed dex 8mg po bid x 2 days and then 4mg po bid x 2 days for delayed emesis after Cisplatin. 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 \par

## 2021-11-16 ENCOUNTER — APPOINTMENT (OUTPATIENT)
Dept: HEMATOLOGY ONCOLOGY | Facility: CLINIC | Age: 26
End: 2021-11-16

## 2021-11-22 ENCOUNTER — LABORATORY RESULT (OUTPATIENT)
Age: 26
End: 2021-11-22

## 2021-11-22 ENCOUNTER — APPOINTMENT (OUTPATIENT)
Dept: HEMATOLOGY ONCOLOGY | Facility: CLINIC | Age: 26
End: 2021-11-22
Payer: COMMERCIAL

## 2021-11-22 ENCOUNTER — APPOINTMENT (OUTPATIENT)
Dept: PULMONOLOGY | Facility: CLINIC | Age: 26
End: 2021-11-22
Payer: COMMERCIAL

## 2021-11-22 PROCEDURE — 94729 DIFFUSING CAPACITY: CPT

## 2021-11-22 PROCEDURE — 94727 GAS DIL/WSHOT DETER LNG VOL: CPT

## 2021-11-22 PROCEDURE — 94060 EVALUATION OF WHEEZING: CPT

## 2021-11-22 PROCEDURE — 36415 COLL VENOUS BLD VENIPUNCTURE: CPT

## 2021-11-22 RX ORDER — LIDOCAINE AND PRILOCAINE 25; 25 MG/G; MG/G
2.5-2.5 CREAM TOPICAL
Qty: 2 | Refills: 5 | Status: DISCONTINUED | COMMUNITY
Start: 2021-09-07 | End: 2021-11-22

## 2021-11-22 RX ORDER — CIPROFLOXACIN HYDROCHLORIDE 500 MG/1
500 TABLET, FILM COATED ORAL TWICE DAILY
Qty: 14 | Refills: 0 | Status: DISCONTINUED | COMMUNITY
Start: 2021-09-23 | End: 2021-11-22

## 2021-11-26 LAB
AFP-TM SERPL-MCNC: 2.1 NG/ML
ALBUMIN SERPL ELPH-MCNC: 4 G/DL
ALP BLD-CCNC: 71 U/L
ALT SERPL-CCNC: 22 U/L
ANION GAP SERPL CALC-SCNC: 12 MMOL/L
AST SERPL-CCNC: 26 U/L
BILIRUB SERPL-MCNC: 0.7 MG/DL
BUN SERPL-MCNC: 13 MG/DL
CALCIUM SERPL-MCNC: 10.4 MG/DL
CHLORIDE SERPL-SCNC: 103 MMOL/L
CO2 SERPL-SCNC: 32 MMOL/L
CREAT SERPL-MCNC: 1 MG/DL
GLUCOSE SERPL-MCNC: 96 MG/DL
HCG-TM SERPL-MCNC: <1 MIU/ML
LDH SERPL-CCNC: NORMAL
POTASSIUM SERPL-SCNC: 4.4 MMOL/L
PROT SERPL-MCNC: 7.2 G/DL
SODIUM SERPL-SCNC: 147 MMOL/L

## 2021-12-06 ENCOUNTER — APPOINTMENT (OUTPATIENT)
Dept: CT IMAGING | Facility: HOSPITAL | Age: 26
End: 2021-12-06

## 2021-12-08 ENCOUNTER — LABORATORY RESULT (OUTPATIENT)
Age: 26
End: 2021-12-08

## 2021-12-08 ENCOUNTER — APPOINTMENT (OUTPATIENT)
Dept: HEMATOLOGY ONCOLOGY | Facility: CLINIC | Age: 26
End: 2021-12-08
Payer: COMMERCIAL

## 2021-12-08 ENCOUNTER — OUTPATIENT (OUTPATIENT)
Dept: OUTPATIENT SERVICES | Facility: HOSPITAL | Age: 26
LOS: 1 days | Discharge: HOME | End: 2021-12-08

## 2021-12-08 VITALS
HEART RATE: 98 BPM | SYSTOLIC BLOOD PRESSURE: 137 MMHG | WEIGHT: 204 LBS | DIASTOLIC BLOOD PRESSURE: 87 MMHG | OXYGEN SATURATION: 98 % | BODY MASS INDEX: 29.2 KG/M2 | TEMPERATURE: 98.2 F | HEIGHT: 70 IN | RESPIRATION RATE: 18 BRPM

## 2021-12-08 DIAGNOSIS — R59.0 LOCALIZED ENLARGED LYMPH NODES: ICD-10-CM

## 2021-12-08 DIAGNOSIS — C62.90 MALIGNANT NEOPLASM OF UNSPECIFIED TESTIS, UNSPECIFIED WHETHER DESCENDED OR UNDESCENDED: ICD-10-CM

## 2021-12-08 PROCEDURE — 99214 OFFICE O/P EST MOD 30 MIN: CPT

## 2021-12-08 RX ORDER — PANTOPRAZOLE 40 MG/1
40 TABLET, DELAYED RELEASE ORAL DAILY
Qty: 30 | Refills: 2 | Status: DISCONTINUED | COMMUNITY
Start: 2021-09-09 | End: 2021-12-08

## 2021-12-08 RX ORDER — PROCHLORPERAZINE MALEATE 10 MG/1
10 TABLET ORAL EVERY 8 HOURS
Qty: 60 | Refills: 4 | Status: DISCONTINUED | COMMUNITY
Start: 2021-09-07 | End: 2021-12-08

## 2021-12-08 RX ORDER — METOCLOPRAMIDE 10 MG/1
10 TABLET ORAL 3 TIMES DAILY
Qty: 60 | Refills: 1 | Status: DISCONTINUED | COMMUNITY
Start: 2021-10-11 | End: 2021-12-08

## 2021-12-08 RX ORDER — ONDANSETRON 8 MG/1
8 TABLET, ORALLY DISINTEGRATING ORAL EVERY 8 HOURS
Qty: 45 | Refills: 3 | Status: DISCONTINUED | COMMUNITY
Start: 2021-08-20 | End: 2021-12-08

## 2021-12-08 NOTE — REVIEW OF SYSTEMS
[Negative] : Allergic/Immunologic [Recent Change In Weight] : ~T recent weight change [Fever] : no fever [Chills] : no chills [Night Sweats] : no night sweats [Fatigue] : no fatigue [Abdominal Pain] : no abdominal pain [Vomiting] : no vomiting [Constipation] : no constipation [Diarrhea] : no diarrhea [FreeTextEntry4] : runny nose  [de-identified] : hair loss, discoloration on nails

## 2021-12-08 NOTE — HISTORY OF PRESENT ILLNESS
[de-identified] : Mr. 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 Borrego), no adjuvant therapy given. He was non-compliant with surveillance. He presents to clinic today with large periaortic lymphadenopathy bx c/w recurrent seminoma (3 years out). Started C1 BEP on 9/7/21 and completed C3D15 BEP on 11/8/21. Today here for f/u after CT scans with [facility], ct DNA now -ve.\par \par ONC Hx:\par Mr. Noyola was diagnosed with Stage IB (pT2pNx) Seminoma in 2018 and underwent Left inguinal radical orchiectomy with Dr. Borrego on 7/6/2018 at [facility]. He presented to the ED at [facility] on 7/9/21 with Left flank pain x 1 week associated with nausea and vomiting. He had CT AP performed on 7/9/21, results showing two enlarged left periaortic soft tissue lymph nodes measuring up to 4.1 x 2.9 x 2.8 cm surrounding the proximal ROSSANA. Findings concerning for metastatic lymphadenopathy.\par \par Had cryptorchid testes, never a smoking. \par \par 6/28/2018 MRI Visceral Pelvis\par Enlargement and replacement of the left testis by an enhancing mass 5.8x4.0x3.2cm, consistent with a testicular malignancy.  \par \par 7/3/2018 CT AP at [facility]\par 1.Splenic hypodensity. Etiology uncertain. \par 2.There are 2 midly enlarged upper mesenteric lymph nodes. Significance uncertain.\par 3.Evidence of chronic pyelonephritis left kidney. Right renal cyst.\par 4.Enlarged and midly fatty liver\par 5.No significant adenopathy in the para-aortic region particularly in the region of the renal lolita. No inguinla lymph nodes\par \par 7/6/2018 Left inguinal radical orchiectomy. meidum size Coloplast 16ml testicular implant placement and plastic closure of inguinial incision with Tenzin Escobar at [facility]\par Pathology report\par -Left testicle and spermatic cord: Seminoma, 6.5cm\par -Specimen laterality : Left\par -Tumor focality:Multifocal\par -Tumor size\par Greatest dimension of main tumor mass : [id number]\par Greatest dimensions of additional tumor nodules: 1.1cm & 1cm\par -Intratubular Germ Cell Neoplasia : Intratubular seminoma \par -Seminoma :Seminoma \par -Timor extension\par Tumor invades rete testis\par Tumor invades into tunica albuginea very close to tunica vaginalis, involvement of vaginalis cannot be excluded\par -Spermatic cord margin : Uninvolved by tumor\par -Lymphovascular invasion : Present\par -Regional Lymph nodes : no lymph nodes submitted or found\par -Pathologic stage : pT2pNx\par \par 7/9/21 CT AP at [facility]\par Two enlarged left periaortic soft tissue lymph nodes measuring up to 4.1 x 2.9 x 2.8 cm surrounding the proximal ROSSANA. Findings concerning for metastatic lymphadenopathy.\par Nonspecific 1.6 cm splenic hypodensity.\par Left renal mid pole 3.5 cm cyst contains enhancing septations. Consider further evaluation with MRI as clinically warranted.\par \par 7/30/2021 CT CHEST:\par 1.  No thoracic adenopathy or metastases.\par 2.  Unchanged splenic hypodensity, not FDG avid on concurrent PET/CT, indeterminate.\par 3.  Incidental probable few left renal calyceal diverticuli. Urogram could be helpful for confirmation.\par \par 7/30/2021 PET/CT:\par - Two enlarged FDG avid para-aortic lymph nodes are suspicious for shane metastasis.\par - No additional sites of pathologic FDG uptake to suggest biologic tumor activity.\par \par 8/18/2021 Final Diagnosis\par Left retroperitoneal lymph node, core biopsy:\par - Fragments of lymph node, with minute foci suspicious for metastatic seminoma (see note).\par Addendum\par Immunostains on FNA cell block confirm Metastatic seminoma, highlighting abundant cells positive for  and  OCT 3/4, in background of lymphocytes.\par \par ct DNA 3.17(7/27/21), not detected(11/22/21)\par \par 12/6/21 CT CAP\par 1. There is no retroperitoneal lymphadenopathy identifies. The previous demonstrated para-aortic lymphadenopathy has resolved with a few small lymph nodes noted in the retroperitoneum. There is no pelvic lymphadenopathy noted.\par 2.Complex left renal cyst. This appears relatively unchanged in appearance compared to the previous exam. Continued f/u [de-identified] : Patient presents to clinic today for f/u after CT scans. Feeling great w/o new symptoms. Gained weight since last visit. Continues to have discoloration on nails but a bit improved. Darnell any ear or abdominal pain, SOB, chest pain, V/N/C/D, or urinary problems.

## 2021-12-08 NOTE — PHYSICAL EXAM
[Restricted in physically strenuous activity but ambulatory and able to carry out work of a light or sedentary nature] : Status 1- Restricted in physically strenuous activity but ambulatory and able to carry out work of a light or sedentary nature, e.g., light house work, office work [Normal] : affect appropriate [de-identified] : hyperpigmentation on nails

## 2021-12-08 NOTE — DISCUSSION/SUMMARY
"Subjective   Carina Zamudio is a 50 y.o. female presents for check on vulvar hematoma - denies fever/chills- has noticed some drainage .     History of Present Illness    The following portions of the patient's history were reviewed and updated as appropriate: allergies, current medications, past family history, past medical history, past social history, past surgical history and problem list.    Review of Systems   Constitutional: Negative for chills, fever and unexpected weight change.   Gastrointestinal: Negative for abdominal distention and abdominal pain.   Genitourinary: Positive for vaginal pain. Negative for dyspareunia, dysuria, menstrual problem, pelvic pain, vaginal bleeding and vaginal discharge.   All other systems reviewed and are negative.  /68   Ht 157.5 cm (62\")   Wt 92.1 kg (203 lb)   LMP  (LMP Unknown)   Breastfeeding No   BMI 37.13 kg/m²       Objective   Physical Exam   Constitutional: She is oriented to person, place, and time. She appears well-developed and well-nourished.   Pulmonary/Chest: Effort normal.   Genitourinary:       Pelvic exam was performed with patient supine.   Genitourinary Comments: Healing vulvar hematoma - no pus - exudative edges    Neurological: She is alert and oriented to person, place, and time.   Skin: Skin is warm and dry.   Psychiatric: She has a normal mood and affect. Her behavior is normal.   Nursing note and vitals reviewed.      Assessment/Plan   Carina was seen today for gynecologic exam.    Diagnoses and all orders for this visit:    Vulvar hematoma  -     Discontinue: doxycycline (VIBRAMYCIN) 100 MG capsule; Take 1 capsule by mouth 2 (Two) Times a Day for 7 days.  -     lidocaine (XYLOCAINE) 2 % jelly; Apply  topically to the appropriate area as directed As Needed for Mild Pain .  -     doxycycline (VIBRAMYCIN) 100 MG capsule; Take 1 capsule by mouth 2 (Two) Times a Day for 7 days.       Rec sitz bathes/warm/cold compresses   Strict infection " [FreeTextEntry1] : Patient here for blood works including ct DNA. He feels fine w/o any problems. Will call us with results.    warnings   Counseling was given to patient for the following topics: instructions for management, risk factor reductions, impressions and importance of treatment compliance . Total time of the encounter was 20 minutes and 15 minutes was spend counseling.

## 2021-12-08 NOTE — ASSESSMENT
[FreeTextEntry1] : Mr. 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 Borrego), no adjuvant therapy given. He was non-compliant with surveillance. He presented with a large periaortic lymphadenopathy bx c/w recurrent seminoma (3 years out). Liq biopsy inconclusive, CT guided bx of LN on 8/18/21 with suspicious for metastatic seminoma, With LDH elevated at 372, AFP -ve, HCG-TM 1 on 8/16/21.  Started C1 BEP on 9/7/21 and completed C3D15 BEP on 11/8/21. Today here for f/u after CT scans with [facility], ct DNA now -ve. \par \par 6/28/2018 MRI Visceral Pelvis\par Enlargement and replacement of the left testis by an enhancing mass 5.8x4.0x3.2cm, consistent with a testicular malignancy.  \par \par 7/3/2018 CT AP at [facility]\par 1.Splenic hypodensity. Etiology uncertain. \par 2.There are 2 midly enlarged upper mesenteric lymph nodes. Significance uncertain.\par 3.Evidence of chronic pyelonephritis left kidney. Right renal cyst.\par 4.Enlarged and midly fatty liver\par 5.No significant adenopathy in the para-aortic region particularly in the region of the renal lolita. No inguinla lymph nodes\par \par 7/6/2018 Left inguinal radical orchiectomy. meidum size Coloplast 16ml testicular implant placement and plastic closure of inguinal incision with Tenzin Escobar at [facility]\par Pathology report\par -Left testicle and spermatic cord: Seminoma, 6.5cm\par -Specimen laterality : Left\par -Tumor focality:Multifocal\par -Tumor size\par Greatest dimension of main tumor mass : 6.9p7d5om\par Greatest dimensions of additional tumor nodules: 1.1cm & 1cm\par -Intratubular Germ Cell Neoplasia : Intratubular seminoma \par -Seminoma :Seminoma \par -Timor extension\par Tumor invades rete testis\par Tumor invades into tunica albuginea very close to tunica vaginalis, involvement of vaginalis cannot be excluded\par -Spermatic cord margin : Uninvolved by tumor\par -Lymphovascular invasion : Present\par -Regional Lymph nodes : no lymph nodes submitted or found\par -Pathologic stage : pT2pNx\par \par 7/9/21 CT AP at [facility]\par Two enlarged left periaortic soft tissue lymph nodes measuring up to 4.1 x 2.9 x 2.8 cm surrounding the proximal ROSSANA. Findings concerning for metastatic lymphadenopathy.\par Nonspecific 1.6 cm splenic hypodensity.\par Left renal mid pole 3.5 cm cyst contains enhancing septations. Consider further evaluation with MRI as clinically warranted.\par \par 7/30/2021 CT CHEST:\par 1.  No thoracic adenopathy or metastases.\par 2.  Unchanged splenic hypodensity, not FDG avid on concurrent PET/CT, indeterminate.\par 3.  Incidental probable few left renal calyceal diverticuli. Urogram could be helpful for confirmation.\par \par 7/30/2021 PET/CT:\par - Two enlarged FDG avid para-aortic lymph nodes are suspicious for shane metastasis.\par - No additional sites of pathologic FDG uptake to suggest biologic tumor activity.\par \par 8/18/2021 Final Diagnosis\par Left retroperitoneal lymph node, core biopsy:\par - Fragments of lymph node, with minute foci suspicious for metastatic seminoma (see note).\par Addendum\par Immunostains on FNA cell block confirm Metastatic seminoma, highlighting abundant cells positive for  and  OCT 3/4, in background of lymphocytes.\par \par ct DNA 3.17(7/27/21),  not detected(11/22/21)\par \par 12/6/21 CT CAP\par 1. There is no retroperitoneal lymphadenopathy identifies. The previous demonstrated para-aortic lymphadenopathy has resolved with a few small lymph nodes noted in the retroperitoneum. There is no pelvic lymphadenopathy noted.\par 2.Complex left renal cyst. This appears relatively unchanged in appearance compared to the previous exam. Continued f/u\par \par #) Seminoma/retroperitoneal LN/abnl scans\par - Was diagnosed in 07/2018, no adjuvant therapy given, was not compliant with active surveillance. Presented with adenpathy, liq biopsy inconclusive, CT guided bx of LN on 8/18/21 with suspicious for metastatic seminoma, With LDH elevated at 372, AFP -ve, HCG-TM 1 on 8/16/21. Check LDH today. \par -ctDNA 7/27/21 detected with 3.17, ct DNA  -ve on 11/22\par - PFT testing in preparation for bleomycin therapy (done, nl 7/27/21). Repeat PFT on 10/6 with normal lung volume and mildly reduced diffusion capacity . Repeated PFT on 10/21/21 (post C2) stable compared to PFTS post C1, less than 25% decline in DLCO compared to baseline (before C1)\par - Sperm banking for fertility preservation completed\par -Risk stratified as good risk (no organ involvement and LDH is < 2.5 times ULN)\par - COVID vaccine completed x 3 (Pfizer)\par - Had Urology appointment who said he was not a surgical candidate (Dr. Schoenfeld from [facility] [place])\par -S/P  3 cycles of BEP for recurrent seminoma\par -Started C1 BEP on 9/7/21. Delayed C1D15 by 3-4 days was on 9/27 d/t afebrile G4 neutropenia. s/p C2D1 on 10/4, C3 on 10/25/21.  Completed  C3D15 BEP on11/8/21\par - Post chemo PFT on 11/22/21 WNL and ct DNA  -ve on 11/22 \par -Repeat CT CAP on 12/6 with [facility]\par -Surveillance: will follow guidelines for Stg III\par A history and physical examination, and tumor markers should be obtained every two months during the first year, every three months during the second year, and every six months for years 3 through 5. Chest radiograph is indicated every six months for the first two years, annually during years 3 and 4, and then as clinically indicated. Abdominal and pelvic CT is indicated every six months during the first year, every 6 to 12 months during year 2, annually for year 3, and then as clinically indicated.\par -Port flush today and monthly\par \par Port flush monthly and RTC with labs in 2 months\par \par \par \par \par

## 2021-12-09 ENCOUNTER — APPOINTMENT (OUTPATIENT)
Dept: HEMATOLOGY ONCOLOGY | Facility: CLINIC | Age: 26
End: 2021-12-09
Payer: COMMERCIAL

## 2021-12-09 VITALS
HEIGHT: 70.24 IN | BODY MASS INDEX: 29.29 KG/M2 | OXYGEN SATURATION: 99 % | WEIGHT: 204.59 LBS | TEMPERATURE: 99 F | HEART RATE: 77 BPM | RESPIRATION RATE: 16 BRPM | SYSTOLIC BLOOD PRESSURE: 129 MMHG | DIASTOLIC BLOOD PRESSURE: 80 MMHG

## 2021-12-09 LAB
AFP-TM SERPL-MCNC: <1.8 NG/ML
ALBUMIN SERPL ELPH-MCNC: 4 G/DL
ALP BLD-CCNC: 75 U/L
ALT SERPL-CCNC: 29 U/L
ANION GAP SERPL CALC-SCNC: 6 MMOL/L
AST SERPL-CCNC: 29 U/L
BILIRUB SERPL-MCNC: 0.6 MG/DL
BUN SERPL-MCNC: 20 MG/DL
CALCIUM SERPL-MCNC: 10.6 MG/DL
CHLORIDE SERPL-SCNC: 105 MMOL/L
CO2 SERPL-SCNC: 34 MMOL/L
CREAT SERPL-MCNC: 1.1 MG/DL
GLUCOSE SERPL-MCNC: 81 MG/DL
HCG-TM SERPL-MCNC: <1 MIU/ML
LDH SERPL-CCNC: 247 U/L
POTASSIUM SERPL-SCNC: 4.3 MMOL/L
PROT SERPL-MCNC: 7.4 G/DL
SODIUM SERPL-SCNC: 145 MMOL/L

## 2021-12-09 PROCEDURE — 99214 OFFICE O/P EST MOD 30 MIN: CPT

## 2021-12-09 NOTE — HISTORY OF PRESENT ILLNESS
[Disease: _____________________] : Disease: [unfilled] [T: ___] : T[unfilled] [N: ___] : N[unfilled] [M: ___] : M[unfilled] [de-identified] : Mr. 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 Borrego), no adjuvant therapy given. He was non-compliant with surveillance. He presents to clinic today with large periaortic lymphadenopathy concerning for seminoma recurrent seminoma (3 years out). Here  today to discuss about further management.\par \par ONC Hx:\par Mr. Noyola was diagnosed with Stage IB (pT2pNx) Seminoma in 2018 and underwent Left inguinal radical orchiectomy with Dr. Borrego on 7/6/2018 at [facility]. He presented to the ED at [facility] on 7/9/21 with Left flank pain x 1 week associated with nausea and vomiting. He had CT AP performed on 7/9/21, results showing two enlarged left periaortic soft tissue lymph nodes measuring up to 4.1 x 2.9 x 2.8 cm surrounding the proximal ROSSANA. 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 \par \par 6/28/2018 MRI Visceral Pelvis\par Enlargement and replacement of the left testis by an enhancing mass 5.8x4.0x3.2cm, consistent with a testicular malignancy.  \par \par 7/3/2018 CT [facility]\par 1.Splenic hypodensity. Etiology uncertain. \par 2.There are 2 midly enlarged upper mesenteric lymph nodes. Significance uncertain.\par 3.Evidence of chronic pyelonephritis left kidney. Right renal cyst.\par 4.Enlarged and midly fatty liver\par 5.No significant adenopathy in the para-aortic region particularly in the region of the renal lolita. No inguinla lymph nodes\par \par 7/6/2018 Left inguinal radical orchiectomy. meidum size Coloplast 16ml testicular implant placement and plastic closure of inguinial incision with Tenzin Escobar at [facility]\par Pathology report\par -Left testicle and spermatic cord: Seminoma, 6.5cm\par -Specimen laterality : Left\par -Tumor focality:Multifocal\par -Tumor size\par Greatest dimension of main tumor mass : [id number]\par Greatest dimensions of additional tumor nodules: 1.1cm & 1cm\par -Intratubular Germ Cell Neoplasia : Intratubular seminoma \par -Seminoma :Seminoma \par -Timor extension\par Tumor invades rete testis\par Tumor invades into tunica albuginea very close to tunica vaginalis, involvement of vaginalis cannot be excluded\par -Spermatic cord margin : Uninvolved by tumor\par -Lymphovascular invasion : Present\par -Regional Lymph nodes : no lymph nodes submitted or found\par -Pathologic stage : pT2pNx\par \par 7/9/21 CT AP at [facility]\par Two enlarged left periaortic soft tissue lymph nodes measuring up to 4.1 x 2.9 x 2.8 cm surrounding the proximal ROSSANA. Findings concerning for metastatic lymphadenopathy.\par Nonspecific 1.6 cm splenic hypodensity.\par Left renal mid pole 3.5 cm cyst contains enhancing septations. Consider further evaluation with MRI as clinically warranted.\par \par 7/30/2021 CT CHEST:\par 1.  No thoracic adenopathy or metastases.\par 2.  Unchanged splenic hypodensity, not FDG avid on concurrent PET/CT, indeterminate.\par 3.  Incidental probable few left renal calyceal diverticuli. Urogram could be helpful for confirmation.\par \par 7/30/2021 PET/CT:\par - Two enlarged FDG avid para-aortic lymph nodes are suspicious for shane metastasis.\par - No additional sites of pathologic FDG uptake to suggest biologic tumor activity.\par \par Had an episode of food poisoning and went to the ER, had a CT scan, revealed RP LAD. No back pains, no cough no FELIX, no fatigue. Libido is normal, Erections are full. APpetite is good, had some intentional weight loss since February, dropped 25 pounds. Gained about 10 pounds back recently out of anxiety. Goes to the gym daily. 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 \par \par  [de-identified] : Completed 3 cycles of BEP at [facility] for recurrent seminoma. Ended November 8, 2021. Had CT with CR on 12/6/21. \par Feels he lost muscle and physical endurance and is now improving. he was able to run a mile yesterday. Appetite is very good. gained several pounds. At the gym every day, working out. No cough, no FELIX, no paresthesias, no tinnitus. No dysgeusia. No edema. No chest pain/pressure/palpitations. No headaches, no dizziness. No fatigue. Libido normal, erections full. Not sexually active at this time.

## 2021-12-09 NOTE — ASSESSMENT
[Palliative Care Plan] : not applicable at this time [FreeTextEntry1] : Hortencia Noyola is seen in follow-up today.  I saw him in second opinion several months ago.  He had recurrent seminoma, nonbulky stage IIb.  He received 3 cycles of BEP chemotherapy at [facility] with Dr. Castillo.  He completed chemotherapy on November 8, and he had a CT scan done this week which revealed resolution of the prior adenopathy.\par \par He feels that he is lost muscle mass and physical endurance is now improving at this point.  He was able to run a mile yesterday.  He is been me turning to the gym and working out on a near daily basis.  His appetite is very good and he gained several pounds.  He actually weighs more than he did at the initiation of his chemotherapy.  There is no cough or shortness of breath.  He has no paresthesias or tinnitus.  There is no altered taste.  There is no edema, chest pain, chest pressure or palpitations.  He denies any headache or dizziness.  There is no fatigue.  Libido is normal and erections are full.  He is not sexually active with a partner at this time.  He notes darkened fingernails.\par \par On physical examination, he appears well and in no acute distress.  His performance status is 0.  The HEENT examination is normal.  There is no palpable adenopathy present.  The lungs are clear and the heart examination is normal.  There is no edema of the extremities.  The abdominal examination is normal.  The phallus is uncircumcised, the glans is normal.  The right testicle is normal to palpation.  Left prosthesis is present.  The skin shows darkening over skin folds as well as darkening of the nails.  There is also darkening of the hair follicles of his abdomen and legs.\par \par Laboratory values were obtained yesterday.  The CBC revealed a white blood cell count of 9.2 with a hemoglobin value of 13 g and a platelet count of 1 88,000.  His premorbid hemoglobins were 15 or greater.  Chemistry panel revealed a calcium minimally elevated at 10.6 with a normal albumin.  The transaminases and alkaline phosphatase were normal.  The LDH was 247, the upper limit of normal is 242.  Elevations of LDH and seminoma of limited consequence, and this may be that the blood was lying around for a while before he was transported to the laboratory.  The beta-hCG was undetectable as was the alpha-fetoprotein.\par \par The CT scan was done at [facility] radiology.  This was a CT of the chest abdomen and pelvis.  The lungs were clear without consolidation effusion or pneumothorax.  There was no adenopathy in the mediastinum.  The liver was unremarkable.  There was a simple cyst in the right kidney measuring 2.2 x 2 cm.  In the left kidney there was cystic areas many of which were too small to characterize.  The largest focus was in the midpole of the left kidney posteriorly measuring 3.2 x 3 x 3 cm there was no lymphadenopathy in the retroperitoneum.  The pelvis was normal.  The previously demonstrated periaortic lymphadenopathy has resolved with a few small lymph nodes noted in the retroperitoneum as stated in the body of the report.\par \par The CT scan of the abdomen and pelvis from July 9 of 2021 performed in our system revealed a 1.6 cm splenic hypodensity which is not mentioned in the report.  There were 2 enlarged para-aortic lymph nodes.  The larger measured 4.1 x 2.9 x 2.8 cm and surrounded the inferior mesenteric artery trunk without vessel occlusion or narrowing.\par \par There is no residual disease present, thus there is no need for PET scan.  He is likely cured of his disease.  He has an [id number] in place, and he would like to have it removed.  It was recommended that he wait to have it removed.\par \par We went over the NCCN guidelines for posttreatment nonbulky to be disease.  CT scans are recommended at 3 6 in 12 months in the first year along with visits every 3 months.  Chest x-rays are done twice a year in year 1 and 2.\par \par He was also counseled about sexual relations and how he should utilize a form of barrier control to prevent pregnancy and I would recommend that he follow this recommendation for at least 6 months.\par \par All questions were answered to the best of my ability and to his apparent satisfaction.  I asked him if he was going to follow-up here or at [facility].  I offered him a telehealth visit in 3 months, and he will have blood work done before that date.\par \par

## 2021-12-09 NOTE — REVIEW OF SYSTEMS
[Recent Change In Weight] : ~T recent weight change [Negative] : Genitourinary [Fever] : no fever [Chills] : no chills [Fatigue] : no fatigue [Chest Pain] : no chest pain [Palpitations] : no palpitations [Lower Ext Edema] : no lower extremity edema [Wheezing] : no wheezing [Cough] : no cough [SOB on Exertion] : no shortness of breath during exertion [Joint Pain] : no joint pain [Joint Stiffness] : no joint stiffness [Muscle Pain] : no muscle pain [Skin Rash] : no skin rash [Dizziness] : no dizziness [Anxiety] : no anxiety [Depression] : no depression [Muscle Weakness] : no muscle weakness [Deepening Of The Voice] : no deepening of the voice [Easy Bleeding] : no tendency for easy bleeding [Easy Bruising] : no tendency for easy bruising [de-identified] : darkened fingernails  [de-identified] : no HA, no paresthesias

## 2021-12-09 NOTE — PHYSICAL EXAM
[Fully active, able to carry on all pre-disease performance without restriction] : Status 0 - Fully active, able to carry on all pre-disease performance without restriction [Normal] : affect appropriate [de-identified] : no edema [de-identified] : no gynecomastia [de-identified] : uncircumcised, glans normal, right testicle normal, left prosthesis.  [de-identified] : darkening of the nails, skin follicles, skin folds

## 2022-02-09 ENCOUNTER — APPOINTMENT (OUTPATIENT)
Dept: HEMATOLOGY ONCOLOGY | Facility: CLINIC | Age: 27
End: 2022-02-09
Payer: COMMERCIAL

## 2022-02-09 PROCEDURE — 36591 DRAW BLOOD OFF VENOUS DEVICE: CPT

## 2022-03-18 ENCOUNTER — OUTPATIENT (OUTPATIENT)
Dept: OUTPATIENT SERVICES | Facility: HOSPITAL | Age: 27
LOS: 1 days | Discharge: HOME | End: 2022-03-18

## 2022-03-18 DIAGNOSIS — C62.90 MALIGNANT NEOPLASM OF UNSPECIFIED TESTIS, UNSPECIFIED WHETHER DESCENDED OR UNDESCENDED: ICD-10-CM

## 2022-03-22 ENCOUNTER — APPOINTMENT (OUTPATIENT)
Dept: HEMATOLOGY ONCOLOGY | Facility: CLINIC | Age: 27
End: 2022-03-22
Payer: COMMERCIAL

## 2022-03-22 ENCOUNTER — RESULT REVIEW (OUTPATIENT)
Age: 27
End: 2022-03-22

## 2022-03-22 ENCOUNTER — APPOINTMENT (OUTPATIENT)
Dept: INFUSION THERAPY | Facility: HOSPITAL | Age: 27
End: 2022-03-22

## 2022-03-22 VITALS
BODY MASS INDEX: 27.78 KG/M2 | HEART RATE: 76 BPM | SYSTOLIC BLOOD PRESSURE: 125 MMHG | TEMPERATURE: 98.6 F | RESPIRATION RATE: 16 BRPM | DIASTOLIC BLOOD PRESSURE: 80 MMHG | WEIGHT: 200.62 LBS | HEIGHT: 71.14 IN | OXYGEN SATURATION: 99 %

## 2022-03-22 LAB
AFP-TM SERPL-MCNC: <1.8 NG/ML
ALBUMIN SERPL ELPH-MCNC: 4.6 G/DL
ALP BLD-CCNC: 85 U/L
ALT SERPL-CCNC: 20 U/L
ANION GAP SERPL CALC-SCNC: 12 MMOL/L
AST SERPL-CCNC: 18 U/L
BASOPHILS # BLD AUTO: 0.04 K/UL
BASOPHILS NFR BLD AUTO: 0.6 %
BILIRUB SERPL-MCNC: 0.4 MG/DL
BUN SERPL-MCNC: 15 MG/DL
CALCIUM SERPL-MCNC: 9.7 MG/DL
CHLORIDE SERPL-SCNC: 103 MMOL/L
CO2 SERPL-SCNC: 27 MMOL/L
CREAT SERPL-MCNC: 1.14 MG/DL
EGFR: 90 ML/MIN/1.73M2
EOSINOPHIL # BLD AUTO: 0.24 K/UL
EOSINOPHIL NFR BLD AUTO: 3.9 %
GLUCOSE SERPL-MCNC: 92 MG/DL
HCG-TM SERPL-MCNC: <1 MIU/ML
HCT VFR BLD CALC: 46.9 %
HGB BLD-MCNC: 14.6 G/DL
IMM GRANULOCYTES NFR BLD AUTO: 0.3 %
LDH SERPL-CCNC: 151 U/L
LYMPHOCYTES # BLD AUTO: 0.76 K/UL
LYMPHOCYTES NFR BLD AUTO: 12.2 %
MAGNESIUM SERPL-MCNC: 1.7 MG/DL
MAN DIFF?: NORMAL
MCHC RBC-ENTMCNC: 28.2 PG
MCHC RBC-ENTMCNC: 31.1 GM/DL
MCV RBC AUTO: 90.7 FL
MONOCYTES # BLD AUTO: 0.63 K/UL
MONOCYTES NFR BLD AUTO: 10.1 %
NEUTROPHILS # BLD AUTO: 4.53 K/UL
NEUTROPHILS NFR BLD AUTO: 72.9 %
PLATELET # BLD AUTO: 199 K/UL
POTASSIUM SERPL-SCNC: 5 MMOL/L
PROT SERPL-MCNC: 6.9 G/DL
RBC # BLD: 5.17 M/UL
RBC # FLD: 13.4 %
SODIUM SERPL-SCNC: 141 MMOL/L
WBC # FLD AUTO: 6.22 K/UL

## 2022-03-22 PROCEDURE — 99213 OFFICE O/P EST LOW 20 MIN: CPT

## 2022-03-22 NOTE — ASSESSMENT
[Palliative Care Plan] : not applicable at this time [FreeTextEntry1] : Galo is seen in the office today in follow-up.  He was diagnosed with seminoma, stage Ib in 2018, and had a recurrence in 2021 with N2 disease.  He was a good risk patient and he had BEP x3 at [facility].  He completed his therapy in November 2021.  He states that he feels "100%".  He reports no pains.  His libido is normal and erections are fine.  He sexually active with a steady partner.  He feels that he has some rainouts on cold exposure and he noted a few times while playing basketball.  He says his fingers turn white.  His appetite is good.  There is no weight loss.  There is no cough or shortness of breath.  He has no chest pain or chest pressure.  He does not experience any tinnitus.  There are no paresthesias except for an "ever so slight tingling" when it is cold.  This is a temporary symptom.  There is no fatigue.  Is no edema of extremities.  He denies any headaches or dizziness.  He has some continued hyperpigmentation of his nails and at the site of some hair follicles.  He previously had a rash and he says is fading.\par \par On physical examination, he appears well.  His performance status is 0.  There is no palpable adenopathy present.  The lungs are clear and the heart examination is normal.  There is no gynecomastia.  The abdominal examination is normal.  The phallus is uncircumcised.  The glans is normal.  The left scrotum has a prosthesis.  The right testicle is normal to palpation.  Extremities are normal.\par \par Laboratory results from March 21 revealed a normal CBC.  The beta-hCG was undetectable.  The chemistry panel was normal.  The LDH was 151.  The magnesium was 1.7.  The alpha-fetoprotein was undetectable.\par \par We reviewed the NCCN guidelines for the management of nonbulky stage IIb disease.  He has received his chemotherapy and is on surveillance at this time.  Visits in year 1 are every 3 months in follow-up.  In year 2 it is every 6 months.  CAT scans were performed at 3 months then at 9 or 12 months.  We had decided he would have his CAT scan done in July.\par \par All questions were answered to the best of my ability and to his apparent satisfaction.  He has my cell phone number and can contact me by text if there is any urgent issue.  I informed him that I do not expect him to have a relapse.  Orders for a CT scan were placed for June, as he said it would be a better time for him than July where he was expected to be moving.  I will see him shortly after the CAT scan is done.

## 2022-03-22 NOTE — PHYSICAL EXAM
[Fully active, able to carry on all pre-disease performance without restriction] : Status 0 - Fully active, able to carry on all pre-disease performance without restriction [Normal] : affect appropriate [de-identified] : no edema [de-identified] : no gynecomastia [de-identified] : uncircumcised, glans normal, left prosthesis, right testicle normal.

## 2022-03-22 NOTE — REVIEW OF SYSTEMS
[Negative] : Gastrointestinal [Chills] : no chills [Fatigue] : no fatigue [Recent Change In Weight] : ~T no recent weight change [Chest Pain] : no chest pain [Palpitations] : no palpitations [Lower Ext Edema] : no lower extremity edema [Wheezing] : no wheezing [Cough] : no cough [SOB on Exertion] : no shortness of breath during exertion [Dysuria] : no dysuria [Incontinence] : no incontinence [Joint Pain] : no joint pain [Joint Stiffness] : no joint stiffness [Muscle Pain] : no muscle pain [Skin Rash] : no skin rash [Dizziness] : no dizziness [Anxiety] : no anxiety [Depression] : no depression [Muscle Weakness] : no muscle weakness [Easy Bleeding] : no tendency for easy bleeding [Easy Bruising] : no tendency for easy bruising [FreeTextEntry9] : no cramps [de-identified] : No HA, occ slight tingling.

## 2022-03-22 NOTE — HISTORY OF PRESENT ILLNESS
[Disease: _____________________] : Disease: [unfilled] [T: ___] : T[unfilled] [N: ___] : N[unfilled] [M: ___] : M[unfilled] [de-identified] : Mr. 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 Borrego), no adjuvant therapy given. He was non-compliant with surveillance. He presents to clinic today with large periaortic lymphadenopathy concerning for seminoma recurrent seminoma (3 years out). Here  today to discuss about further management.\par \par ONC Hx:\par Mr. Noyola was diagnosed with Stage IB (pT2pNx) Seminoma in 2018 and underwent Left inguinal radical orchiectomy with Dr. Borrego on 7/6/2018 at [facility]. He presented to the ED at [facility] on 7/9/21 with Left flank pain x 1 week associated with nausea and vomiting. He had CT AP performed on 7/9/21, results showing two enlarged left periaortic soft tissue lymph nodes measuring up to 4.1 x 2.9 x 2.8 cm surrounding the proximal ROSSANA. 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 \par \par 6/28/2018 MRI Visceral Pelvis\par Enlargement and replacement of the left testis by an enhancing mass 5.8x4.0x3.2cm, consistent with a testicular malignancy.  \par \par 7/3/2018 CT AP at [facility]\par 1.Splenic hypodensity. Etiology uncertain. \par 2.There are 2 midly enlarged upper mesenteric lymph nodes. Significance uncertain.\par 3.Evidence of chronic pyelonephritis left kidney. Right renal cyst.\par 4.Enlarged and midly fatty liver\par 5.No significant adenopathy in the para-aortic region particularly in the region of the renal lolita. No inguinla lymph nodes\par \par 7/6/2018 Left inguinal radical orchiectomy. meidum size Coloplast 16ml testicular implant placement and plastic closure of inguinial incision with Tenzin Escobar at [facility]\par Pathology report\par -Left testicle and spermatic cord: Seminoma, 6.5cm\par -Specimen laterality : Left\par -Tumor focality:Multifocal\par -Tumor size\par Greatest dimension of main tumor mass : [id number]\par Greatest dimensions of additional tumor nodules: 1.1cm & 1cm\par -Intratubular Germ Cell Neoplasia : Intratubular seminoma \par -Seminoma :Seminoma \par -Timor extension\par Tumor invades rete testis\par Tumor invades into tunica albuginea very close to tunica vaginalis, involvement of vaginalis cannot be excluded\par -Spermatic cord margin : Uninvolved by tumor\par -Lymphovascular invasion : Present\par -Regional Lymph nodes : no lymph nodes submitted or found\par -Pathologic stage : pT2pNx\par \par 7/9/21 CT AP at [facility]\par Two enlarged left periaortic soft tissue lymph nodes measuring up to 4.1 x 2.9 x 2.8 cm surrounding the proximal ROSSANA. Findings concerning for metastatic lymphadenopathy.\par Nonspecific 1.6 cm splenic hypodensity.\par Left renal mid pole 3.5 cm cyst contains enhancing septations. Consider further evaluation with MRI as clinically warranted.\par \par 7/30/2021 CT CHEST:\par 1.  No thoracic adenopathy or metastases.\par 2.  Unchanged splenic hypodensity, not FDG avid on concurrent PET/CT, indeterminate.\par 3.  Incidental probable few left renal calyceal diverticuli. Urogram could be helpful for confirmation.\par \par 7/30/2021 PET/CT:\par - Two enlarged FDG avid para-aortic lymph nodes are suspicious for shane metastasis.\par - No additional sites of pathologic FDG uptake to suggest biologic tumor activity.\par \par Had an episode of food poisoning and went to the ER, had a CT scan, revealed RP LAD. No back pains, no cough no FELIX, no fatigue. Libido is normal, Erections are full. APpetite is good, had some intentional weight loss since February, dropped 25 pounds. Gained about 10 pounds back recently out of anxiety. Goes to the gym daily. 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 \par \par 12/9/21...Completed 3 cycles of BEP at [facility] for recurrent seminoma. Ended November 8, 2021. Had CT with CR on 12/6/21. \par Feels he lost muscle and physical endurance and is now improving. he was able to run a mile yesterday. Appetite is very good. gained several pounds. At the gym every day, working out. No cough, no FELIX, no paresthesias, no tinnitus. No dysgeusia. No edema. No chest pain/pressure/palpitations. No headaches, no dizziness. No fatigue. Libido normal, erections full. Not sexually active at this time.  [de-identified] : 3/22/22...Seminoma 1B 2018, recurrence 2021 with N2 disease, good risk, had BEP x 3 at [facility]. Completed in November 2021. Feels ""100%". No pains noted. Libido is fine, erections are good. Sexually active, steady partner. He feels he has some Raynaud's in the cold, noted it a few times while playing basketball, fingers turned white. Appetite is good, no weight loss. No cough, no FLEIX. No chest pains/pressure. No tinnitus, no paresthesias except for "ever so slight tingling" when it is cold, temporary. NO fatigue. No edema. No headaches, no dizziness.

## 2022-03-23 ENCOUNTER — NON-APPOINTMENT (OUTPATIENT)
Age: 27
End: 2022-03-23

## 2022-03-23 LAB
AFP-TM SERPL-MCNC: <1.8 NG/ML — SIGNIFICANT CHANGE UP
TESTOST FREE+TOTAL PANEL SERPL-MCNC: 420 NG/DL — SIGNIFICANT CHANGE UP (ref 249–836)

## 2022-06-08 ENCOUNTER — OUTPATIENT (OUTPATIENT)
Dept: OUTPATIENT SERVICES | Facility: HOSPITAL | Age: 27
LOS: 1 days | Discharge: HOME | End: 2022-06-08

## 2022-06-08 DIAGNOSIS — C62.90 MALIGNANT NEOPLASM OF UNSPECIFIED TESTIS, UNSPECIFIED WHETHER DESCENDED OR UNDESCENDED: ICD-10-CM

## 2022-06-10 ENCOUNTER — APPOINTMENT (OUTPATIENT)
Dept: CT IMAGING | Facility: HOSPITAL | Age: 27
End: 2022-06-10

## 2022-06-10 ENCOUNTER — RESULT REVIEW (OUTPATIENT)
Age: 27
End: 2022-06-10

## 2022-06-10 ENCOUNTER — OUTPATIENT (OUTPATIENT)
Dept: OUTPATIENT SERVICES | Facility: HOSPITAL | Age: 27
LOS: 1 days | End: 2022-06-10
Payer: COMMERCIAL

## 2022-06-10 PROCEDURE — 74177 CT ABD & PELVIS W/CONTRAST: CPT

## 2022-06-10 PROCEDURE — 74177 CT ABD & PELVIS W/CONTRAST: CPT | Mod: 26

## 2022-06-14 ENCOUNTER — APPOINTMENT (OUTPATIENT)
Dept: HEMATOLOGY ONCOLOGY | Facility: CLINIC | Age: 27
End: 2022-06-14
Payer: COMMERCIAL

## 2022-06-14 LAB
AFP-TM SERPL-MCNC: <1.8 NG/ML
ALBUMIN SERPL ELPH-MCNC: 4.7 G/DL
ALP BLD-CCNC: 99 U/L
ALT SERPL-CCNC: 17 U/L
ANION GAP SERPL CALC-SCNC: 15 MMOL/L
AST SERPL-CCNC: 24 U/L
BASOPHILS # BLD AUTO: 0.04 K/UL
BASOPHILS NFR BLD AUTO: 0.7 %
BILIRUB SERPL-MCNC: 0.5 MG/DL
BUN SERPL-MCNC: 16 MG/DL
CALCIUM SERPL-MCNC: 9.6 MG/DL
CHLORIDE SERPL-SCNC: 102 MMOL/L
CO2 SERPL-SCNC: 24 MMOL/L
CREAT SERPL-MCNC: 1.05 MG/DL
EGFR: 100 ML/MIN/1.73M2
EOSINOPHIL # BLD AUTO: 0.14 K/UL
EOSINOPHIL NFR BLD AUTO: 2.3 %
GLUCOSE SERPL-MCNC: 85 MG/DL
HCG-TM SERPL-MCNC: <1 MIU/ML
HCT VFR BLD CALC: 46.4 %
HGB BLD-MCNC: 14.7 G/DL
IMM GRANULOCYTES NFR BLD AUTO: 0.3 %
LDH SERPL-CCNC: 171 U/L
LYMPHOCYTES # BLD AUTO: 1.24 K/UL
LYMPHOCYTES NFR BLD AUTO: 20.7 %
MAGNESIUM SERPL-MCNC: 1.8 MG/DL
MAN DIFF?: NORMAL
MCHC RBC-ENTMCNC: 29.5 PG
MCHC RBC-ENTMCNC: 31.7 GM/DL
MCV RBC AUTO: 93 FL
MONOCYTES # BLD AUTO: 0.6 K/UL
MONOCYTES NFR BLD AUTO: 10 %
NEUTROPHILS # BLD AUTO: 3.95 K/UL
NEUTROPHILS NFR BLD AUTO: 66 %
PLATELET # BLD AUTO: 196 K/UL
POTASSIUM SERPL-SCNC: 5 MMOL/L
PROT SERPL-MCNC: 6.8 G/DL
RBC # BLD: 4.99 M/UL
RBC # FLD: 14.1 %
SODIUM SERPL-SCNC: 140 MMOL/L
TESTOST SERPL-MCNC: 520 NG/DL
WBC # FLD AUTO: 5.99 K/UL

## 2022-06-14 PROCEDURE — 99212 OFFICE O/P EST SF 10 MIN: CPT | Mod: 95

## 2022-06-14 NOTE — REASON FOR VISIT
[Follow-Up Visit] : a follow-up [Home] : at home, [unfilled] , at the time of the visit. [Medical Office: ([facility])___] : at the medical office located in  [Patient] : the patient [FreeTextEntry2] : recurrent seminoma

## 2022-06-14 NOTE — REVIEW OF SYSTEMS
[Fever] : no fever [Chills] : no chills [Fatigue] : no fatigue [Recent Change In Weight] : ~T no recent weight change [Chest Pain] : no chest pain [Palpitations] : no palpitations [Lower Ext Edema] : no lower extremity edema [Wheezing] : no wheezing [Cough] : no cough [SOB on Exertion] : no shortness of breath during exertion [Dysuria] : no dysuria [Incontinence] : no incontinence [Joint Pain] : no joint pain [Joint Stiffness] : no joint stiffness [Muscle Pain] : no muscle pain [Skin Rash] : no skin rash [Anxiety] : no anxiety [Depression] : no depression [Muscle Weakness] : no muscle weakness [Easy Bleeding] : a tendency for easy bleeding [Easy Bruising] : no tendency for easy bruising [Negative] : Gastrointestinal [FreeTextEntry4] : no tinnitus [FreeTextEntry9] : no cramps [de-identified] : no pruritus [de-identified] : No HA, no paresthesias

## 2022-06-14 NOTE — HISTORY OF PRESENT ILLNESS
[Disease: _____________________] : Disease: [unfilled] [T: ___] : T[unfilled] [N: ___] : N[unfilled] [M: ___] : M[unfilled] [AJCC Stage: ____] : AJCC Stage: [unfilled] [de-identified] : Mr. 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 Borrego), no adjuvant therapy given. He was non-compliant with surveillance. He presents to clinic today with large periaortic lymphadenopathy concerning for seminoma recurrent seminoma (3 years out). Here  today to discuss about further management.\par \par ONC Hx:\par Mr. Noyola was diagnosed with Stage IB (pT2pNx) Seminoma in 2018 and underwent Left inguinal radical orchiectomy with Dr. Borrego on 7/6/2018 at [facility]. He presented to the ED at [facility] on 7/9/21 with Left flank pain x 1 week associated with nausea and vomiting. He had CT AP performed on 7/9/21, results showing two enlarged left periaortic soft tissue lymph nodes measuring up to 4.1 x 2.9 x 2.8 cm surrounding the proximal ROSSANA. 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 \par \par 6/28/2018 MRI Visceral Pelvis\par Enlargement and replacement of the left testis by an enhancing mass 5.8x4.0x3.2cm, consistent with a testicular malignancy.  \par \par 7/3/2018 CT AP [facility]\par 1.Splenic hypodensity. Etiology uncertain. \par 2.There are 2 midly enlarged upper mesenteric lymph nodes. Significance uncertain.\par 3.Evidence of chronic pyelonephritis left kidney. Right renal cyst.\par 4.Enlarged and midly fatty liver\par 5.No significant adenopathy in the para-aortic region particularly in the region of the renal lolita. No inguinla lymph nodes\par \par 7/6/2018 Left inguinal radical orchiectomy. meidum size Coloplast 16ml testicular implant placement and plastic closure of inguinial incision with Tenzin Escobar at [facility]\par Pathology report\par -Left testicle and spermatic cord: Seminoma, 6.5cm\par -Specimen laterality : Left\par -Tumor focality:Multifocal\par -Tumor size\par Greatest dimension of main tumor mass : [id number]\par Greatest dimensions of additional tumor nodules: 1.1cm & 1cm\par -Intratubular Germ Cell Neoplasia : Intratubular seminoma \par -Seminoma :Seminoma \par -Timor extension\par Tumor invades rete testis\par Tumor invades into tunica albuginea very close to tunica vaginalis, involvement of vaginalis cannot be excluded\par -Spermatic cord margin : Uninvolved by tumor\par -Lymphovascular invasion : Present\par -Regional Lymph nodes : no lymph nodes submitted or found\par -Pathologic stage : pT2pNx\par \par 7/9/21 CT AP at [facility]\par Two enlarged left periaortic soft tissue lymph nodes measuring up to 4.1 x 2.9 x 2.8 cm surrounding the proximal ROSSANA. Findings concerning for metastatic lymphadenopathy.\par Nonspecific 1.6 cm splenic hypodensity.\par Left renal mid pole 3.5 cm cyst contains enhancing septations. Consider further evaluation with MRI as clinically warranted.\par \par 7/30/2021 CT CHEST:\par 1.  No thoracic adenopathy or metastases.\par 2.  Unchanged splenic hypodensity, not FDG avid on concurrent PET/CT, indeterminate.\par 3.  Incidental probable few left renal calyceal diverticuli. Urogram could be helpful for confirmation.\par \par 7/30/2021 PET/CT:\par - Two enlarged FDG avid para-aortic lymph nodes are suspicious for shane metastasis.\par - No additional sites of pathologic FDG uptake to suggest biologic tumor activity.\par \par Had an episode of food poisoning and went to the ER, had a CT scan, revealed RP LAD. No back pains, no cough no FELIX, no fatigue. Libido is normal, Erections are full. APpetite is good, had some intentional weight loss since February, dropped 25 pounds. Gained about 10 pounds back recently out of anxiety. Goes to the gym daily. 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 \par \par 12/9/21...Completed 3 cycles of BEP at [facility] for recurrent seminoma. Ended November 8, 2021. Had CT with CR on 12/6/21. \par Feels he lost muscle and physical endurance and is now improving. he was able to run a mile yesterday. Appetite is very good. gained several pounds. At the gym every day, working out. No cough, no FELIX, no paresthesias, no tinnitus. No dysgeusia. No edema. No chest pain/pressure/palpitations. No headaches, no dizziness. No fatigue. Libido normal, erections full. Not sexually active at this time. \par \par 3/22/22...Seminoma 1B 2018, recurrence 2021 with N2 disease, good risk, had BEP x 3 at [facility]. Completed in November 2021. Feels ""100%". No pains noted. Libido is fine, erections are good. Sexually active, steady partner. He feels he has some Raynaud's in the cold, noted it a few times while playing basketball, fingers turned white. Appetite is good, no weight loss. No cough, no FELIX. No chest pains/pressure. No tinnitus, no paresthesias except for "ever so slight tingling" when it is cold, temporary. NO fatigue. No edema. No headaches, no dizziness.  [de-identified] : 6/14/22...Seminoma 1B 2018, recurrence 2021 with N2 disease, good risk, had BEP x 3 at [facility]. Completed in November 2021. Feels well. No pains noted. No cough, no FELIX. NO headaches, no dizziness. No leg edema. NO chest pain/pressure/palpitations. Libido is normal, erections normal. NO fatigue noted. \par

## 2022-06-14 NOTE — ASSESSMENT
[Palliative Care Plan] : not applicable at this time [FreeTextEntry1] : Galo Noyola is seen in follow up via telehealth today.  He was diagnosed with a 1B seminoma in 2018 and had recurrence in 2021 with N2 disease and is a good risk seminoma.  He received 3 cycles of BEP at [facility] and completed therapy in November 2021.  He feels well.  He reports no pains.  He has no cough or shortness of breath.  There are no headaches or dizziness.  There is no leg edema.  He has no chest pain chest pressure or palpitations.  Libido is normal and erections are normal.  No fatigue is noted.  He performs self-examination and has not noted any abnormality.  He does not have any tinnitus or paresthesias.  He has noted there are a few patchy areas of poor hair regrowth in the beard area near his ears as well as behind his ears on the scalp.\par \par On physical examination, he appears well and in no acute distress.  His performance status is 0.\par \par Laboratory tests revealed normal tumor markers.  The testosterone level was 520.  CBC and chemistry results were normal.  The magnesium was 1.8.\par \par He had a CT scan performed on Kirstin 10 at [facility].  It has not been read as yet.  I reviewed the images myself and I did not see anything significantly abnormal.  I called radiology next[place] for a quick review of the films, but the radiologist was performing a procedure at this time.  Once I have the results of his scan, I will contact him.  He would like to have his [id number] removed and we discussed the logistics of this.  He would require some coagulation tests.  It would be removed at [facility] once we are assured that there is no evidence of recurrence.\par \par All questions were answered to the best my ability and to his apparent satisfaction.  I reviewed the NCCN guidelines with him.  July will be the 1 year león of his recurrence.  In year #2, CT scans are done annually with chest x-rays every 6 months and physical visits every 6 months.  I will see him in 3 months time.

## 2022-06-16 LAB
APTT BLD: 32.8 SEC
INR PPP: 1 RATIO
PT BLD: 11.7 SEC

## 2022-06-17 ENCOUNTER — RESULT REVIEW (OUTPATIENT)
Age: 27
End: 2022-06-17

## 2022-06-17 ENCOUNTER — OUTPATIENT (OUTPATIENT)
Dept: OUTPATIENT SERVICES | Facility: HOSPITAL | Age: 27
LOS: 1 days | End: 2022-06-17
Payer: COMMERCIAL

## 2022-06-17 ENCOUNTER — APPOINTMENT (OUTPATIENT)
Dept: INTERVENTIONAL RADIOLOGY/VASCULAR | Facility: HOSPITAL | Age: 27
End: 2022-06-17

## 2022-06-17 PROCEDURE — 36590 REMOVAL TUNNELED CV CATH: CPT

## 2022-06-23 ENCOUNTER — APPOINTMENT (OUTPATIENT)
Dept: HEMATOLOGY ONCOLOGY | Facility: CLINIC | Age: 27
End: 2022-06-23

## 2022-07-05 ENCOUNTER — EMERGENCY (EMERGENCY)
Facility: HOSPITAL | Age: 27
LOS: 0 days | Discharge: HOME | End: 2022-07-06
Attending: EMERGENCY MEDICINE | Admitting: EMERGENCY MEDICINE

## 2022-07-05 VITALS
SYSTOLIC BLOOD PRESSURE: 132 MMHG | DIASTOLIC BLOOD PRESSURE: 80 MMHG | OXYGEN SATURATION: 99 % | WEIGHT: 195.11 LBS | HEIGHT: 71 IN | HEART RATE: 120 BPM | TEMPERATURE: 99 F | RESPIRATION RATE: 17 BRPM

## 2022-07-05 DIAGNOSIS — S76.112A STRAIN OF LEFT QUADRICEPS MUSCLE, FASCIA AND TENDON, INITIAL ENCOUNTER: ICD-10-CM
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
## 2022-07-05 DIAGNOSIS — M25.572 PAIN IN LEFT ANKLE AND JOINTS OF LEFT FOOT: ICD-10-CM

## 2022-07-05 DIAGNOSIS — Z85.47 PERSONAL HISTORY OF MALIGNANT NEOPLASM OF TESTIS: ICD-10-CM
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
## 2022-07-05 DIAGNOSIS — S89.92XA UNSPECIFIED INJURY OF LEFT LOWER LEG, INITIAL ENCOUNTER: ICD-10-CM

## 2022-07-05 PROCEDURE — 73562 X-RAY EXAM OF KNEE 3: CPT | Mod: 26,LT

## 2022-07-05 PROCEDURE — 99284 EMERGENCY DEPT VISIT MOD MDM: CPT

## 2022-07-05 RX ORDER — KETOROLAC TROMETHAMINE 30 MG/ML
15 SYRINGE (ML) INJECTION ONCE
Refills: 0 | Status: DISCONTINUED | OUTPATIENT
Start: 2022-07-05 | End: 2022-07-05

## 2022-07-05 RX ADMIN — Medication 15 MILLIGRAM(S): at 23:24

## 2022-07-05 NOTE — ED ADULT TRIAGE NOTE - CHIEF COMPLAINT QUOTE
pt co of left knee pain after colliding while playing basketball, cannot bare weight, no obvious deformity noted

## 2022-07-05 NOTE — ED ADULT NURSE NOTE - NSIMPLEMENTINTERV_GEN_ALL_ED
Implemented All Universal Safety Interventions:  [place] to call system. Call bell, personal items and telephone within reach. Instruct patient to call for assistance. Room bathroom lighting operational. Non-slip footwear when patient is off stretcher. Physically safe environment: no spills, clutter or unnecessary equipment. Stretcher in lowest position, wheels locked, appropriate side rails in place.

## 2022-07-06 VITALS
DIASTOLIC BLOOD PRESSURE: 73 MMHG | SYSTOLIC BLOOD PRESSURE: 126 MMHG | HEART RATE: 98 BPM | RESPIRATION RATE: 18 BRPM | TEMPERATURE: 99 F | OXYGEN SATURATION: 98 %

## 2022-07-06 RX ORDER — KETOROLAC TROMETHAMINE 30 MG/ML
1 SYRINGE (ML) INJECTION
Qty: 20 | Refills: 0
Start: 2022-07-06 | End: 2022-07-10

## 2022-07-06 RX ADMIN — Medication 15 MILLIGRAM(S): at 01:53

## 2022-07-06 NOTE — ED PROVIDER NOTE - NSFOLLOWUPINSTRUCTIONS_ED_ALL_ED_FT
Patellar Tendon Tear       A patellar tendon tear, also called a patellar tendon rupture, is a tear in the thick band of tissue that connects the kneecap (patella) to the shin bone (tibia). The condition can make it hard or impossible to straighten your leg.      What are the causes?  This condition may be caused by:  •A hard, direct hit to the front of your knee.      •Falling on your knee.      •A deep cut under your patella.      •Landing on your foot with your knee bent after a high jump or fall.      •Activity-related wear and tear that weakens the tendon.        What increases the risk?  You are more likely to develop this condition if you:  •Are an athlete who plays contact sports.      •Are an athlete who participates in sports that involve jumping or have a high risk of falls, such as downhill skiing.      •Are younger than age 40.    •Have a weakened tendon from:  •Long-lasting jumper's knee (patellar tendinitis).      •Medical conditions like diabetes or rheumatoid arthritis.      •Repeated corticosteroid injections to the knee.          What are the signs or symptoms?  The main symptom of this condition is severe pain. Other symptoms include:  •A popping sound.      •Swelling.      •Weakness in your knee.      •Pain and tenderness in your knee.      •Difficulty straightening your knee or not being able to straighten your knee.      •A feeling that your knee is giving way (instability).      •Bruising.      •A patella that slides up toward the thigh.        How is this diagnosed?  This condition may be diagnosed based on:  •Your symptoms.      •Your medical history.      •A physical exam. During the exam, your health care provider will check the position of your patella and see if you can extend your knee.    •Imaging tests, such as:  •X-rays to check if your patella has moved up and out of place.      •An MRI to check for a tear or disruption of the patellar tendon.          How is this treated?  This condition may be treated by:  •Resting your knee and keeping it from bending.      •Wearing a knee brace for several weeks to keep your knee straight.      •Using crutches or a walker to keep weight off your knee.      •Taking over-the-counter or prescription medicines to reduce pain and swelling.      •Doing stretching and strengthening exercises (physical therapy) to restore full movement and strength to your knee.      •Having surgery to repair the tendon if it is ruptured all the way.        Follow these instructions at home:    If you have a brace:     •Wear the brace as told by your health care provider. Remove it only as told by your health care provider.      •Loosen the brace if your toes tingle, become numb, or turn cold and blue.      •Keep the brace clean.    •If the brace is not waterproof:  •Do not let it get wet.      •Cover it with a watertight covering when you take a bath or shower.          Managing pain, stiffness, and swelling    •If directed, put ice on the injured area.  •If you have a removable brace, remove it as told by your health care provider.      •Put ice in a plastic bag.      •Place a towel between your skin and the bag.      •Leave the ice on for 20 minutes, 2–3 times a day.        •Move your toes often to reduce stiffness and swelling.      •Raise (elevate) your knee above the level of your heart while you are sitting or lying down.      Medicines     •Take over-the-counter and prescription medicines only as told by your health care provider.    •Ask your health care provider if the medicine prescribed to you:  •Requires you to avoid driving or using heavy machinery.    •Can cause constipation. You may need to take actions to prevent or treat constipation, such as:  •Drink enough fluid to keep your urine pale yellow.      •Take over-the-counter or prescription medicines.      •Eat foods that are high in fiber, such as beans, whole grains, and fresh fruits and vegetables.      •Limit foods that are high in fat and processed sugars, such as fried or sweet foods.          Activity     • Do not use your knee to support your body weight until your health care provider says that you can. Use crutches or a walker as told by your health care provider.      •Return to your normal activities as told by your health care provider. Ask your health care provider what activities are safe for you.      •Do exercises as told by your health care provider.      General instructions     •Ask your health care provider when it is safe for you to drive.      • Do not use any products that contain nicotine or tobacco, such as cigarettes, e-cigarettes, and chewing tobacco. These can delay healing. If you need help quitting, ask your health care provider.      •Keep all follow-up visits as told by your health care provider. This is important.        How is this prevented?    •Warm up and stretch before being active.      •Cool down and stretch after being active.      •Give your body time to rest between periods of activity.      •Make sure to use equipment that fits you.      •Be safe and responsible while being active. This will help you avoid falls which can damage the tendon.    •Maintain physical fitness, including:  •Strength.      •Flexibility.          Contact a health care provider if:    •Your symptoms do not get better in 2 weeks.        Get help right away if:    •Your symptoms get worse.        Summary    •A patellar tendon tear, also called a patellar tendon rupture, is a tear in the thick band of tissue that connects the kneecap (patella) to the shin bone (tibia).      •Follow instructions for treatment as told by your health care provider. This may include resting your knee, wearing a brace, using crutches or a walker, and doing certain exercises.      • Do not use your knee to support your body weight until your health care provider says that you can.      •Contact a health care provider if your symptoms do not get better or they get worse.      •Keep all follow-up visits as told by your health care provider. This is important.      This information is not intended to replace advice given to you by your health care provider. Make sure you discuss any questions you have with your health care provider.

## 2022-07-06 NOTE — ED PROVIDER NOTE - NS ED ROS FT
Review of Systems    Constitutional: (-) fever   Eyes/ENT: (-) vision changes  Cardiovascular: (-) chest pain, (-) syncope (-) palpitations  Respiratory: (-) cough, (-) shortness of breath  Gastrointestinal: (-) vomiting  Musculoskeletal: (-) neck pain, (-) back pain  Integumentary: see hpi   Neurological: (-) headache  Hematologic: (-) easy bruising

## 2022-07-06 NOTE — ED PROVIDER NOTE - PHYSICAL EXAMINATION
PHYSICAL EXAM:    GENERAL: Alert, appears stated age, well appearing, non-toxic  SKIN: Warm, pink and dry.  HEAD: NC, AT  EYE: Normal lids/conjunctiva  ENT: Normal hearing, patent oropharynx  NECK: +supple. No meningismus, or JVD  Pulm: normal resp effort  CV: 2+ dp pulses   Mskel: inferior to L patella there is a deformity in patellar tendon. +swellign to L knee.   Neuro: AAOx3, no sensory/motor deficits. 5/5 strength throughout.

## 2022-07-06 NOTE — ED PROVIDER NOTE - OBJECTIVE STATEMENT
26-year-old male with PMH testicular cancer, L partial tendon rupture presents with moderate constant throbbing nonradiating left knee pain s/p playing basketball and someone else's knee knocking into his knee x 1 hr pta. + Much worse with movement, much better with rest.  No paresthesia, open wounds, head injury, LOC, nausea, vomiting, any other symptom.

## 2022-07-06 NOTE — ED PROVIDER NOTE - NS ED ATTENDING STATEMENT MOD
This was a shared visit with the ANGELIA. I reviewed and verified the documentation and independently performed the documented:

## 2022-07-06 NOTE — ED PROVIDER NOTE - NSPTACCESSSVCSAPPTDETAILS_ED_ALL_ED_FT
NELA-- patellar tendon complete rupture. 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

## 2022-07-06 NOTE — ED PROVIDER NOTE - PATIENT PORTAL LINK FT
You can access the FollowMyHealth Patient Portal offered by [facility] by registering at the following website: [email or web address]. By joining [facility]’s FollowMyHealth portal, you will also be able to view your health information using other applications (apps) compatible with our system.

## 2022-07-06 NOTE — ED PROVIDER NOTE - CLINICAL SUMMARY MEDICAL DECISION MAKING FREE TEXT BOX
26M p/w left knee popping sensation s/p basketball injury. exam with left knee swelling, and unable to straight leg. distal pulses present. XR revealed possible patellar tendon disruption and possible avulsion. Ortho evaluated the pt- plan for surgery later in the week. DC home.

## 2022-07-06 NOTE — ED PROVIDER NOTE - CARE PROVIDER_API CALL
Arnav Oliver)  [facility]  [address] [place] [address] [place] [address]  Phone: [phone]  Fax: [phone]  Follow Up Time: 1-3 Days

## 2022-07-07 ENCOUNTER — APPOINTMENT (OUTPATIENT)
Dept: ORTHOPEDIC SURGERY | Facility: CLINIC | Age: 27
End: 2022-07-07

## 2022-07-07 ENCOUNTER — OUTPATIENT (OUTPATIENT)
Dept: OUTPATIENT SERVICES | Facility: HOSPITAL | Age: 27
LOS: 1 days | Discharge: HOME | End: 2022-07-07

## 2022-07-07 VITALS
TEMPERATURE: 98 F | WEIGHT: 195.11 LBS | HEIGHT: 71 IN | HEART RATE: 72 BPM | SYSTOLIC BLOOD PRESSURE: 122 MMHG | OXYGEN SATURATION: 100 % | RESPIRATION RATE: 16 BRPM | DIASTOLIC BLOOD PRESSURE: 72 MMHG

## 2022-07-07 DIAGNOSIS — Z01.818 ENCOUNTER FOR OTHER PREPROCEDURAL EXAMINATION: ICD-10-CM

## 2022-07-07 DIAGNOSIS — S86.821D: ICD-10-CM

## 2022-07-07 DIAGNOSIS — Z90.79 ACQUIRED ABSENCE OF OTHER GENITAL ORGAN(S): Chronic | ICD-10-CM

## 2022-07-07 PROCEDURE — 99204 OFFICE O/P NEW MOD 45 MIN: CPT

## 2022-07-07 PROCEDURE — 93010 ELECTROCARDIOGRAM REPORT: CPT

## 2022-07-07 NOTE — PHYSICAL EXAM
[FreeTextEntry3] :  left knee with an effusion a palpable gap at the inferior pole of the patella he is unable to extend his knee or keep it straight falls into a flexed position

## 2022-07-07 NOTE — ASSESSMENT
[FreeTextEntry1] :  inferior pole patella fracture with patella tendon rupture left knee recommend MRI did discuss surgical options operative non operative\par Surgical Discussion (general)\par \par The patient was advised of the diagnosis.  The natural history of the pathology was explained in full to the patient in layman's terms. All questions were answered.  The risks and benefits of surgical and non-surgical treatment alternatives were explained in full to the patient. \par \par The patient demonstrated a full understanding of the surgical and non-surgical options.  The risks of surgery were outlined in full to the patient including but not limited to bleeding, scarring, infection, sepsis, neurologic injury, vascular injury, failure to resolve symptoms, symptom recurrence, the need for further surgery, non-healing, wound breakdown, deep vein thrombosis, pulmonary embolism, spontaneous osteonecrosis, anesthesia complications and even death.  The patient understood all the risks and accepted them and understood that other complications could occur that are not mentioned above.  The intraoperative plan, post-operative plan, post-operative expectations and limitations were explained in full.  Expectations from non-surgical treatment were explained in full as well.  The patient demonstrated a complete understanding of the treatment alternatives and requested the above-mentioned procedure.  This will be scheduled accordingly.\par \par  who call me 1 day after for the results of his MRI\par

## 2022-07-07 NOTE — H&P PST ADULT - HISTORY OF PRESENT ILLNESS
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 Denies COVI D S/S. Recd 3 doses vaccine. Verbalized understanding of COVID prevention measures. Exercise arturo 9-10 FOS prior to injury.   Anesthesia Alert  NO--Difficult Airway  NO--History of neck surgery or radiation  NO--Limited ROM of neck  NO--History of Malignant hyperthermia  NO--Personal or family history of Pseudocholinesterase deficiency  NO--Prior Anesthesia Complication  NO--Latex Allergy  NO--Loose teeth  NO--History of Rheumatoid Arthritis  NO--JOSEY  No Bleeding risk  NO--Other_____

## 2022-07-07 NOTE — DATA REVIEWED
[FreeTextEntry1] :  x-rays reviewed from the PAC system at the hospital taken showing a comminuted fracture the inferior pole of the patella with high-riding patella

## 2022-07-07 NOTE — H&P PST ADULT - NSANTHOSAYNRD_GEN_A_CORE
No. JOSEY screening performed.  STOP BANG Legend: 0-2 = LOW Risk; 3-4 = INTERMEDIATE Risk; 5-8 = HIGH Risk

## 2022-07-07 NOTE — H&P PST ADULT - WEIGHT IN KG
This office note has been dictated.    No past surgical history on file.    Social History     Social History   • Marital status:      Spouse name: N/A   • Number of children: N/A   • Years of education: N/A     Social History Main Topics   • Smoking status: Never Smoker   • Smokeless tobacco: Never Used   • Alcohol use Yes      Comment: a beer or two sometimes.    • Drug use: None   • Sexual activity: Not Asked     Other Topics Concern   • None     Social History Narrative       Family History   Problem Relation Age of Onset   • High cholesterol Other    • Hypertension Other        Patient Care Team:  Jose Kelly MD as PCP - General (Internal Medicine)      
88.5

## 2022-07-09 ENCOUNTER — LABORATORY RESULT (OUTPATIENT)
Age: 27
End: 2022-07-09

## 2022-07-11 NOTE — ASU PATIENT PROFILE, ADULT - FALL HARM RISK - UNIVERSAL INTERVENTIONS
Bed in lowest position, wheels locked, appropriate side rails in place/Call bell, personal items and telephone in reach/Instruct patient to call for assistance before getting out of bed or chair/Non-slip footwear when patient is out of bed/[place] to call system/Physically safe environment - no spills, clutter or unnecessary equipment/Purposeful Proactive Rounding/Room/bathroom lighting operational, light cord in reach

## 2022-07-11 NOTE — ASU PATIENT PROFILE, ADULT - ACCEPTABLE
Luz Maria Rivero  Preferred Name:   Ermalinda Goldmann      Female, 63year old, 1961      Phone:   [phone] (M)  Last Weight:   131.6 kg (290 lb 1.6 oz)  PCP:   Kori Gonzalez MD    Pt Reminder List:   None      COVID-19 Status: Test Result Pending NOT Symptomatic      Allergies:   Sulfa Antibiotics,   Â   Augmentin [Amoclan],   Â   Doxycycline,   Â   Imidazole Antifungals,   Â   Lisinopril,   Â   Polytrim [Polymyxin B-trimethoprim],   Â   Dilaudid [Hydromorphone]    Primary Ins:   COMMON G  MRN:   [id number]    Patient Portal:   Active  Last Logon:   22    Next Appt: With Family Practice  2022 at 10:00 AM  Last Appt With Me:   None    Hx:                             Patient Call    You routed conversation to Plastics Surg Schedule Pool Em 26 minutes ago (2:18 PM)     You  Eric Colon A 27 minutes ago (2:17 PM)     You 27 minutes ago (2:17 PM)     WM       Confirmed 2022 with patient via voicemail. [facility] arrival at 5:30am / begin at 7:30am.  Patient will call back to set up follow up with Raghu Sarabia PA-C, the Physician's Assistant who works with Dr. Janel Jenkins and also contact her PCP to set up a pre-op with in 30 days for surgery. Â   Patient to call with any questions or concerns. Â          Documentation      You 12 days ago     WM       Spoke to patient and offered her  and . She will check with her  and call back to let her know what will work best for her. Documentation      You  Thamas Budtatum A 12 days ago     You routed conversation to Plastics Surg Schedule Pool Em 13 days ago     You 13 days ago     WM       Left message for patient Reggie Kaminski   Telephone Information:   Mobile [phone]   [facility] schedule procedure. Patient to return call Jan Thomas [phone].   Â   Fall of ??  Â   Â            Documentation      You  Sarai Valle A 13 days ago     You routed conversation to Plastics Surg Schedule Pool Em 1 month ago     You 1 month ago     WM       Left message for patient at          Telephone Information:   Mobile [phone]    to schedule procedure. [facility] [phone]. Â   Fall of 2022??  Â          Documentation      You  Iraj Burgess 1 month ago                 Recent Patient Communication     Last Update Reason Specialty     Today -- Plastic Surgery     Em Plastic Surgery [facility]     1 week ago Refill [address], Cecilio Suha A Closed     1 week ago Information Only Hematology & Oncology     [facility] Ingris Chowdary J Closed     3 weeks ago Orders Only Hematology & Oncology     [facility] On[facility] [address]     1 month ago Refill Request Hematology & Oncology     [facility] On[facility] Gh Eyad Jacquiedk Closed    Â   There are additional recent communications with this patient. View the rest in Chart Review. Â       Patient Outreach History (Since 6/15/2017)      Covid-19 Plasma Donor Candidate    Date Method of Outreach Associated Actions User Next Outreach   11/30/2020 Â 7:56 AM Patient Portal Message Assignment: [id number] Plasma Donor Identification Series Cheikh Robles RN      Other    Date Method of Outreach Associated Actions User Next Outreach   1/3/2022 Â 1:27 PM Patient Portal Message Assignment: [facility] SERIES QNR Marybeth Vogel        Outpatient Current Medications as of as of 5/2/2022     Disp Refills Start End   capecitabine (XELODA) 500 MG tablet 96 tablet 0 4/27/2022    Sig - Route: Take 3 tablets by mouth 2 times daily. Do not start before April 27, 2022. Take twice daily for 14 days followed by 7 days off. - Oral   Class: Eprescribe   Notes to Pharmacy: Completes cycle 7 (starts April 6 - 12 pills). Â Plus Cycle 8 (starts April 27). capecitabine (XELODA) 500 MG tablet 84 tablet 0 4/27/2022    Sig - Route: Take 3 tablets by mouth 2 times daily. Do not start before April 27, 2022.  Take twice daily for 14 days followed by 7 days off. - Oral   Class: Eprescribe   Notes to Pharmacy: Cycle 8 (starts April 27). capecitabine (XELODA) 500 MG tablet 12 tablet 0 4/27/2022    Sig - Route: Take 3 tablets by mouth 2 times daily. Do not start before April 27, 2022. Take twice daily for 14 days followed by 7 days off. - Oral   Class: Eprescribe   Notes to Pharmacy: Completes cycle 7 (starts April 6 - 12 pills). capecitabine (XELODA) 500 MG tablet 84 tablet 0 4/1/2022    Sig - Route: Take 3 tablets by mouth 2 times daily. Do not start before April 1, 2022. Take twice daily for 14 days followed by 7 days off. - Oral   Class: Eprescribe   Notes to Pharmacy: ,Day supply: 21   triamcinolone (KENALOG) 0.5 % cream 30 g 0 2/10/2022    Sig - Route: Apply topically 2 times daily. - Topical   Class: Eprescribe   mupirocin (BACTROBAN) 2 % ointment 22 g 0 2/7/2022    Sig - Route: Apply topically 3 times daily. - Topical   Class: Eprescribe   Cyanocobalamin (Vitamin B-12) 5000 MCG disintegrating tablet       Class: Historical Med   Route: Oral   ondansetron (ZOFRAN ODT) 8 MG disintegrating tablet 24 tablet 3 1/7/2022    Sig - Route: Place 1 tablet onto your tongue every 12 hours as needed for Nausea. - Translingual   Class: Eprescribe   losartan (COZAAR) 100 MG tablet (Discontinued) 45 tablet 1 12/16/2021 6/6/2022   Sig - Route: Take 0.5 tablets by mouth daily. - Oral   Class: Eprescribe   Ascorbic Acid (vitamin C) 1000 MG tablet       Sig - Route: Take 1,000 mg by mouth daily. - Oral   Class: Historical Med   Multiple Vitamins-Minerals (CENTRUM SILVER ADULT 50+ PO)       Sig - Route: Take 1 tablet by mouth daily. Â - Oral   Class: Historical Med   loratadine (CLARITIN) 10 MG tablet       Sig - Route: Take 10 mg by mouth daily. - Oral   Class: Historical Med   carvedilol (COREG) 25 MG tablet 90 tablet 1 1/21/2021    Sig - Route: Take 0.5 tablets by mouth 2 times daily (with meals).  - Oral   Class: Eprescribe   Cholecalciferol (VITAMIN D3 PO)       Sig - Route: Take 1 tablet by mouth daily. Â - Oral   Class: Historical Med   Multiple Vitamins-Minerals (ZINC PO)       Sig - Route: Take 1 tablet by mouth daily.  Â - Oral   Class: Historical Med     Encounter Status    Open 2

## 2022-07-12 ENCOUNTER — OUTPATIENT (OUTPATIENT)
Dept: OUTPATIENT SERVICES | Facility: HOSPITAL | Age: 27
LOS: 1 days | Discharge: HOME | End: 2022-07-12

## 2022-07-12 ENCOUNTER — TRANSCRIPTION ENCOUNTER (OUTPATIENT)
Age: 27
End: 2022-07-12

## 2022-07-12 ENCOUNTER — APPOINTMENT (OUTPATIENT)
Age: 27
End: 2022-07-12

## 2022-07-12 VITALS
RESPIRATION RATE: 19 BRPM | HEIGHT: 71 IN | WEIGHT: 195.11 LBS | HEART RATE: 80 BPM | SYSTOLIC BLOOD PRESSURE: 117 MMHG | DIASTOLIC BLOOD PRESSURE: 58 MMHG | OXYGEN SATURATION: 100 % | TEMPERATURE: 99 F

## 2022-07-12 VITALS
RESPIRATION RATE: 18 BRPM | OXYGEN SATURATION: 100 % | SYSTOLIC BLOOD PRESSURE: 128 MMHG | HEART RATE: 83 BPM | DIASTOLIC BLOOD PRESSURE: 83 MMHG

## 2022-07-12 DIAGNOSIS — S86.819A STRAIN OF OTHER MUSCLE(S) AND TENDON(S) AT LOWER LEG LEVEL, UNSPECIFIED LEG, INITIAL ENCOUNTER: ICD-10-CM

## 2022-07-12 DIAGNOSIS — Z90.79 ACQUIRED ABSENCE OF OTHER GENITAL ORGAN(S): Chronic | ICD-10-CM

## 2022-07-12 PROCEDURE — 27405 REPAIR OF KNEE LIGAMENT: CPT | Mod: LT

## 2022-07-12 PROCEDURE — 27380 REPAIR OF KNEECAP TENDON: CPT | Mod: LT

## 2022-07-12 RX ORDER — METOCLOPRAMIDE HCL 10 MG
10 TABLET ORAL ONCE
Refills: 0 | Status: DISCONTINUED | OUTPATIENT
Start: 2022-07-12 | End: 2022-07-26

## 2022-07-12 RX ORDER — OXYCODONE AND ACETAMINOPHEN 5; 325 MG/1; MG/1
5-325 TABLET ORAL
Qty: 21 | Refills: 0 | Status: DISCONTINUED | COMMUNITY
Start: 2022-07-12 | End: 2022-07-12

## 2022-07-12 RX ORDER — HYDROMORPHONE HYDROCHLORIDE 2 MG/ML
1 INJECTION INTRAMUSCULAR; INTRAVENOUS; SUBCUTANEOUS ONCE
Refills: 0 | Status: DISCONTINUED | OUTPATIENT
Start: 2022-07-12 | End: 2022-07-12

## 2022-07-12 RX ORDER — HYDROMORPHONE HYDROCHLORIDE 2 MG/ML
0.5 INJECTION INTRAMUSCULAR; INTRAVENOUS; SUBCUTANEOUS
Refills: 0 | Status: DISCONTINUED | OUTPATIENT
Start: 2022-07-12 | End: 2022-07-12

## 2022-07-12 RX ORDER — OXYCODONE AND ACETAMINOPHEN 5; 325 MG/1; MG/1
1 TABLET ORAL ONCE
Refills: 0 | Status: DISCONTINUED | OUTPATIENT
Start: 2022-07-12 | End: 2022-07-26

## 2022-07-12 RX ORDER — SODIUM CHLORIDE 9 MG/ML
1000 INJECTION, SOLUTION INTRAVENOUS
Refills: 0 | Status: DISCONTINUED | OUTPATIENT
Start: 2022-07-12 | End: 2022-07-26

## 2022-07-12 RX ADMIN — SODIUM CHLORIDE 100 MILLILITER(S): 9 INJECTION, SOLUTION INTRAVENOUS at 13:17

## 2022-07-12 RX ADMIN — HYDROMORPHONE HYDROCHLORIDE 1 MILLIGRAM(S): 2 INJECTION INTRAMUSCULAR; INTRAVENOUS; SUBCUTANEOUS at 13:15

## 2022-07-12 NOTE — ASU DISCHARGE PLAN (ADULT/PEDIATRIC) - ASU DC SPECIAL INSTRUCTIONSFT
Post-Operative Knee  Surgery Instructions    Anesthesia:  - No Alcoholic beverages, including beer and wine, for 24 hours or while on prescribed pain medications  - Do not make any important decisions or sign any legal documents  - Do not drive or operate machinery for 24 hours  - You are required upon discharge to leave the surgical center with a responsible adult who will drive you home    Surgery:  - Stay indoors for 2 days  - Continue weight bearing as tolerated with knee brace on all times - only use crutches for severe pain  - Stairs can be done one step at a time  - Keep clean and dry for 5 days  - Remove dressing in 5 days and cover wounds with band-aids, then you can shower  - If knee is swollen, ice for 10 minutes, 3 times daily while awake  - Ankle range of motion 10 times every hour when awake to both lower extremities for 3 days.  This increases circulation and decreases swelling and decreases the chance for clots  - Take pain medication as prescribed  - Resume regular diet  - Follow-up in approximately 1 week    FOR QUESTIONS OR CONCERNS, CALL [phone]    Notify your doctor if you develop: fever, chills, excessive sweating, drainage, pain not controlled by pain medication      IF AN EMERGENCY ARISES CALL [phone] AND/OR GO TO THE EMERGENCY ROOM    DR. PAUL  [phone]

## 2022-07-12 NOTE — CHART NOTE - NSCHARTNOTEFT_GEN_A_CORE
Anesthesia Post Op Assessment  		(    ) Intubated           TV _____	Rate _sv____	[id number]____  		(  x  ) Patent airway. Full return of protective reflexes  		(  x  )Full recovery from anesthesia/sedation to baseline status      Cardiovascular Function:  		BP:	138/86	                  Pulse:		83                  RR:		12                  Temp:		97.4                  O2Sat:     98      Mental Status:  	        ( x   ) awake		  (  x  ) alert		 (    ) drowsy	               (    ) sedated      Nausea/Vomiting:  		(    ) Yes, See post-op orders		   (  x  ) No      Pain Scale: (0-10):			Treatment:     (    ) None	            (  x  ) See Post-Op/PCA Orders      Post-operative Fluids: 	   (    ) Oral	          (  x  ) See post-op Orders        Comments:    Uneventful. No complications from anesthesia.  Discharge when criteria met.

## 2022-07-12 NOTE — ASU DISCHARGE PLAN (ADULT/PEDIATRIC) - NS MD DC FALL RISK RISK
For information on Fall & Injury Prevention, visit: [email or web address] OR  [email or web address] OR  https://www.cdc.gov/steadi/patient.html

## 2022-07-12 NOTE — ASU DISCHARGE PLAN (ADULT/PEDIATRIC) - CARE PROVIDER_API CALL
Xavier Mccray (MD)  Orthopaedic Surgery; Sports Medicine  [address] [place] [address]  Phone: [phone]  Fax: [phone]  Follow Up Time:

## 2022-07-14 ENCOUNTER — APPOINTMENT (OUTPATIENT)
Age: 27
End: 2022-07-14

## 2022-07-14 DIAGNOSIS — S76.112A STRAIN OF LEFT QUADRICEPS MUSCLE, FASCIA AND TENDON, INITIAL ENCOUNTER: ICD-10-CM
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
## 2022-07-20 ENCOUNTER — APPOINTMENT (OUTPATIENT)
Dept: ORTHOPEDIC SURGERY | Facility: CLINIC | Age: 27
End: 2022-07-20

## 2022-07-20 PROBLEM — C62.90 MALIGNANT NEOPLASM OF UNSPECIFIED TESTIS, UNSPECIFIED WHETHER DESCENDED OR UNDESCENDED: Chronic | Status: ACTIVE | Noted: 2022-07-07

## 2022-07-20 PROCEDURE — 99024 POSTOP FOLLOW-UP VISIT: CPT

## 2022-07-20 NOTE — DISCUSSION/SUMMARY
[de-identified] :   Today the sutures were removed, Steri-Strips placed.  He can weightbear as tolerated in the brace.  He will do straight leg raising at home.  He will be locked in the brace in full extension for a total of 6 weeks.  I will see him back the week of August 23, 2022 to open up his brace to 30° and start gentle range of motion.  He understands to be out of work between 3 and 6 months.

## 2022-07-20 NOTE — HISTORY OF PRESENT ILLNESS
[de-identified] : The patient is a 26-year-old male here for subsequent re-evaluation of his left knee.  He is status post patellar tendon repair and lateral capsular repair on 07/12/2022.  He is 8 days out from his surgery.  His knee is doing very well.  He is not having any pain.  He is wearing his postoperative Breg Brace.

## 2022-08-23 ENCOUNTER — APPOINTMENT (OUTPATIENT)
Dept: ORTHOPEDIC SURGERY | Facility: CLINIC | Age: 27
End: 2022-08-23

## 2022-08-23 PROCEDURE — 99024 POSTOP FOLLOW-UP VISIT: CPT

## 2022-08-23 NOTE — DISCUSSION/SUMMARY
[de-identified] : Today his brace was opened up to 30° of flexion.  He will work on range-of-motion home with flexion.  He stated several times he can really bend his knee to 50°, I advised him he should not be bending the knee that much that he can re-rupture of the patellar tendon.  I needed to reiterate several times the importance of following up postoperative instructions so as not a re-rupture is patellar tendon.  He states he understands but he is anxious to return to work.  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  He asked about formal therapy, I explained yet again it is way too soon for that.  He will follow up in a month for further evaluation.\par \par Supervising physician:

## 2022-08-23 NOTE — PHYSICAL EXAM
[Left] : left knee [NL (0)] : extension 0 degrees [] : patient ambulates without assistive device [FreeTextEntry3] :  surgical incision well healed. [TWNoteComboBox7] : flexion 30 degrees

## 2022-08-23 NOTE — HISTORY OF PRESENT ILLNESS
[de-identified] : The patient is a 27-year-old male here for subsequent re-evaluation of his left knee.  He is status post patellar tendon repair and lateral capsular repair on 07/12/2022.  He is  6 weeks out from his surgery.  His knee is doing very well.  He is not having any pain.  He is wearing his postoperative Breg Brace.

## 2022-09-09 ENCOUNTER — OUTPATIENT (OUTPATIENT)
Dept: OUTPATIENT SERVICES | Facility: HOSPITAL | Age: 27
LOS: 1 days | Discharge: HOME | End: 2022-09-09

## 2022-09-09 DIAGNOSIS — C62.90 MALIGNANT NEOPLASM OF UNSPECIFIED TESTIS, UNSPECIFIED WHETHER DESCENDED OR UNDESCENDED: ICD-10-CM

## 2022-09-09 DIAGNOSIS — Z90.79 ACQUIRED ABSENCE OF OTHER GENITAL ORGAN(S): Chronic | ICD-10-CM

## 2022-09-13 ENCOUNTER — APPOINTMENT (OUTPATIENT)
Dept: HEMATOLOGY ONCOLOGY | Facility: CLINIC | Age: 27
End: 2022-09-13

## 2022-09-13 ENCOUNTER — RESULT REVIEW (OUTPATIENT)
Age: 27
End: 2022-09-13

## 2022-09-13 VITALS
HEART RATE: 83 BPM | RESPIRATION RATE: 16 BRPM | BODY MASS INDEX: 28.3 KG/M2 | WEIGHT: 202.16 LBS | HEIGHT: 71.06 IN | OXYGEN SATURATION: 99 % | DIASTOLIC BLOOD PRESSURE: 78 MMHG | SYSTOLIC BLOOD PRESSURE: 117 MMHG | TEMPERATURE: 97.3 F

## 2022-09-13 LAB
BASOPHILS # BLD AUTO: 0.03 K/UL — SIGNIFICANT CHANGE UP (ref 0–0.2)
BASOPHILS NFR BLD AUTO: 0.5 % — SIGNIFICANT CHANGE UP (ref 0–2)
EOSINOPHIL # BLD AUTO: 0.15 K/UL — SIGNIFICANT CHANGE UP (ref 0–0.5)
EOSINOPHIL NFR BLD AUTO: 2.5 % — SIGNIFICANT CHANGE UP (ref 0–6)
HCG-TM SERPL-MCNC: <1 MIU/ML
HCT VFR BLD CALC: 44.3 % — SIGNIFICANT CHANGE UP (ref 39–50)
HGB BLD-MCNC: 14.7 G/DL — SIGNIFICANT CHANGE UP (ref 13–17)
IMM GRANULOCYTES NFR BLD AUTO: 0.2 % — SIGNIFICANT CHANGE UP (ref 0–1.5)
LYMPHOCYTES # BLD AUTO: 1.25 K/UL — SIGNIFICANT CHANGE UP (ref 1–3.3)
LYMPHOCYTES # BLD AUTO: 20.8 % — SIGNIFICANT CHANGE UP (ref 13–44)
MCHC RBC-ENTMCNC: 29.3 PG — SIGNIFICANT CHANGE UP (ref 27–34)
MCHC RBC-ENTMCNC: 33.2 G/DL — SIGNIFICANT CHANGE UP (ref 32–36)
MCV RBC AUTO: 88.2 FL — SIGNIFICANT CHANGE UP (ref 80–100)
MONOCYTES # BLD AUTO: 0.59 K/UL — SIGNIFICANT CHANGE UP (ref 0–0.9)
MONOCYTES NFR BLD AUTO: 9.8 % — SIGNIFICANT CHANGE UP (ref 2–14)
NEUTROPHILS # BLD AUTO: 3.98 K/UL — SIGNIFICANT CHANGE UP (ref 1.8–7.4)
NEUTROPHILS NFR BLD AUTO: 66.2 % — SIGNIFICANT CHANGE UP (ref 43–77)
NRBC # BLD: 0 /100 WBCS — SIGNIFICANT CHANGE UP (ref 0–0)
PLATELET # BLD AUTO: 198 K/UL — SIGNIFICANT CHANGE UP (ref 150–400)
RBC # BLD: 5.02 M/UL — SIGNIFICANT CHANGE UP (ref 4.2–5.8)
RBC # FLD: 13.1 % — SIGNIFICANT CHANGE UP (ref 10.3–14.5)
WBC # BLD: 6.01 K/UL — SIGNIFICANT CHANGE UP (ref 3.8–10.5)
WBC # FLD AUTO: 6.01 K/UL — SIGNIFICANT CHANGE UP (ref 3.8–10.5)

## 2022-09-13 PROCEDURE — 99213 OFFICE O/P EST LOW 20 MIN: CPT

## 2022-09-13 RX ORDER — OXYCODONE AND ACETAMINOPHEN 5; 325 MG/1; MG/1
5-325 TABLET ORAL AS DIRECTED
Qty: 21 | Refills: 0 | Status: COMPLETED | COMMUNITY
Start: 2022-07-12 | End: 2022-09-13

## 2022-09-13 NOTE — HISTORY OF PRESENT ILLNESS
[Disease: _____________________] : Disease: [unfilled] [T: ___] : T[unfilled] [N: ___] : N[unfilled] [M: ___] : M[unfilled] [AJCC Stage: ____] : AJCC Stage: [unfilled] [de-identified] : Mr. 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 Borrego), no adjuvant therapy given. He was non-compliant with surveillance. He presents to clinic today with large periaortic lymphadenopathy concerning for seminoma recurrent seminoma (3 years out). Here  today to discuss about further management.\par \par ONC Hx:\par Mr. Noyola was diagnosed with Stage IB (pT2pNx) Seminoma in 2018 and underwent Left inguinal radical orchiectomy with Dr. Borrego on 7/6/2018 at [facility]. He presented to the ED at [facility] on 7/9/21 with Left flank pain x 1 week associated with nausea and vomiting. He had CT AP performed on 7/9/21, results showing two enlarged left periaortic soft tissue lymph nodes measuring up to 4.1 x 2.9 x 2.8 cm surrounding the proximal ROSSANA. 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 \par \par 6/28/2018 MRI Visceral Pelvis\par Enlargement and replacement of the left testis by an enhancing mass 5.8x4.0x3.2cm, consistent with a testicular malignancy.  \par \par 7/3/2018 CT AP [facility]\par 1.Splenic hypodensity. Etiology uncertain. \par 2.There are 2 midly enlarged upper mesenteric lymph nodes. Significance uncertain.\par 3.Evidence of chronic pyelonephritis left kidney. Right renal cyst.\par 4.Enlarged and midly fatty liver\par 5.No significant adenopathy in the para-aortic region particularly in the region of the renal lolita. No inguinla lymph nodes\par \par 7/6/2018 Left inguinal radical orchiectomy. meidum size Coloplast 16ml testicular implant placement and plastic closure of inguinial incision with Tenzin Escobar at [facility]\par Pathology report\par -Left testicle and spermatic cord: Seminoma, 6.5cm\par -Specimen laterality : Left\par -Tumor focality:Multifocal\par -Tumor size\par Greatest dimension of main tumor mass : 6.1a9k1ud\par Greatest dimensions of additional tumor nodules: 1.1cm & 1cm\par -Intratubular Germ Cell Neoplasia : Intratubular seminoma \par -Seminoma :Seminoma \par -Timor extension\par Tumor invades rete testis\par Tumor invades into tunica albuginea very close to tunica vaginalis, involvement of vaginalis cannot be excluded\par -Spermatic cord margin : Uninvolved by tumor\par -Lymphovascular invasion : Present\par -Regional Lymph nodes : no lymph nodes submitted or found\par -Pathologic stage : pT2pNx\par \par 7/9/21 CT AP at [facility]\par Two enlarged left periaortic soft tissue lymph nodes measuring up to 4.1 x 2.9 x 2.8 cm surrounding the proximal ROSSANA. Findings concerning for metastatic lymphadenopathy.\par Nonspecific 1.6 cm splenic hypodensity.\par Left renal mid pole 3.5 cm cyst contains enhancing septations. Consider further evaluation with MRI as clinically warranted.\par \par 7/30/2021 CT CHEST:\par 1.  No thoracic adenopathy or metastases.\par 2.  Unchanged splenic hypodensity, not FDG avid on concurrent PET/CT, indeterminate.\par 3.  Incidental probable few left renal calyceal diverticuli. Urogram could be helpful for confirmation.\par \par 7/30/2021 PET/CT:\par - Two enlarged FDG avid para-aortic lymph nodes are suspicious for shane metastasis.\par - No additional sites of pathologic FDG uptake to suggest biologic tumor activity.\par \par Had an episode of food poisoning and went to the ER, had a CT scan, revealed RP LAD. No back pains, no cough no FELIX, no fatigue. Libido is normal, Erections are full. APpetite is good, had some intentional weight loss since February, dropped 25 pounds. Gained about 10 pounds back recently out of anxiety. Goes to the gym daily. 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 \par \par 12/9/21...Completed 3 cycles of BEP at [facility] for recurrent seminoma. Ended November 8, 2021. Had CT with CR on 12/6/21. \par Feels he lost muscle and physical endurance and is now improving. he was able to run a mile yesterday. Appetite is very good. gained several pounds. At the gym every day, working out. No cough, no FELIX, no paresthesias, no tinnitus. No dysgeusia. No edema. No chest pain/pressure/palpitations. No headaches, no dizziness. No fatigue. Libido normal, erections full. Not sexually active at this time. \par \par 3/22/22...Seminoma 1B 2018, recurrence 2021 with N2 disease, good risk, had BEP x 3 at [facility]. Completed in November 2021. Feels ""100%". No pains noted. Libido is fine, erections are good. Sexually active, steady partner. He feels he has some Raynaud's in the cold, noted it a few times while playing basketball, fingers turned white. Appetite is good, no weight loss. No cough, no FELIX. No chest pains/pressure. No tinnitus, no paresthesias except for "ever so slight tingling" when it is cold, temporary. NO fatigue. No edema. No headaches, no dizziness. \par \par 6/14/22...Seminoma 1B 2018, recurrence 2021 with N2 disease, good risk, had BEP x 3 at [facility]. Completed in November 2021. Feels well. No pains noted. No cough, no FELIX. NO headaches, no dizziness. No leg edema. NO chest pain/pressure/palpitations. Libido is normal, erections normal. NO fatigue noted. \par  [de-identified] : 9/13/22. July 2021, recurrent seminoma, 3 years after orchiectomy, now 1 year post relapse and of chemo. BEP x 3 was administered at [facility]>  .had a patellar tendon rupture repair in July. Had a partial tear in the past, was playing basketful and was struck on the knee. overall, feels great. Out of work due to the patellar injury, no PT,returning to work next week. Appetite is good, no weight loss. No cough, no FELIX. NO back/flank pain. No F/C. NO edema of the ankles. Libido is fine, erections are full. Self exam done, no issue noted. he started chemo in September 2021.

## 2022-09-13 NOTE — PHYSICAL EXAM
[Fully active, able to carry on all pre-disease performance without restriction] : Status 0 - Fully active, able to carry on all pre-disease performance without restriction [Normal] : affect appropriate [de-identified] : no edema [de-identified] : no gynecomastia [de-identified] : uncirc, glans normal, left prosthesis, right testicle normal

## 2022-09-13 NOTE — REVIEW OF SYSTEMS
[Negative] : Gastrointestinal [Chest Pain] : no chest pain [Palpitations] : no palpitations [Lower Ext Edema] : no lower extremity edema [Cough] : no cough [SOB on Exertion] : no shortness of breath during exertion [Dysuria] : no dysuria [Incontinence] : no incontinence [Joint Pain] : no joint pain [Joint Stiffness] : no joint stiffness [Muscle Pain] : no muscle pain [Skin Rash] : no skin rash [Dizziness] : no dizziness [Anxiety] : no anxiety [Depression] : no depression [Muscle Weakness] : no muscle weakness [Easy Bleeding] : no tendency for easy bleeding [Easy Bruising] : no tendency for easy bruising [FreeTextEntry4] : no tinnitus [FreeTextEntry9] : no cramps [de-identified] : No HA, no paresthesias

## 2022-09-13 NOTE — ASSESSMENT
[Palliative Care Plan] : not applicable at this time [FreeTextEntry1] : Galo Noyola is seen in the office today in follow-up.  He was diagnosed with seminoma in 2018 and was lost to follow-up.  In July 2021 he had recurrent disease when he presented with flank pain.  This was 3 years after his orchiectomy, and is now 1 year after his relapse and start of chemotherapy.  He had BEP x3, administered at [facility].  In recent times he had a patellar tendon rupture repair in July.  He is a partial tear in the past and he was playing basketball and was struck on the knee.  Overall at this time, he feels great.  He is out of work due to the disability from the patellar injury but he is returning to work next week.  He said that he is about 4 weeks at a schedule.  His appetite is good and there is no weight loss.  There is no cough or shortness of breath.  There is no back or flank pain.  There were no fevers or chills.  There is no edema of the ankles.  There is no chest pain or chest pressure.  Libido is fine and erections are full.  He performs self-examination, no issue has been noted.  There is no tenderness or paresthesias.  There were no muscle cramps.\par \par On physical examination, he appears well and in no acute distress.  His performance status is 0.  His blood pressure was 117/78.  HEENT examination is normal.  There is no palpable adenopathy.  The chest is clear and the heart examination is normal.  There is no edema of the extremities.  There is no gynecomastia present.  The abdominal examination is normal.  Examination of the genitalia shows an uncircumcised phallus with a normal glans.  He has a left-sided prosthesis present in the scrotal sac, and the right testicle is normal.  There is no spinal column or chest wall tenderness to palpation or percussion.\par \par Laboratory tests will be obtained today.  His last CT scan was performed in June.  The NCCN guidelines were reviewed with him.  Scans are 3 6 and 9 months after chemotherapy within the first year, and then annually and year #2.  He completed his chemotherapy in November of last year.  I will see him once again in 4 months time and this can be by telehealth.  He will have a CT scan prior to that visit as well as laboratory work.  Today's laboratory tests are pending.  All questions were answered to the best of my ability and to his apparent satisfaction.

## 2022-09-14 ENCOUNTER — APPOINTMENT (OUTPATIENT)
Dept: ORTHOPEDIC SURGERY | Facility: CLINIC | Age: 27
End: 2022-09-14

## 2022-09-14 DIAGNOSIS — S86.812A STRAIN OF OTHER MUSCLE(S) AND TENDON(S) AT LOWER LEG LEVEL, LEFT LEG, INITIAL ENCOUNTER: ICD-10-CM

## 2022-09-14 LAB
AFP-TM SERPL-MCNC: <1.8 NG/ML
ALBUMIN SERPL ELPH-MCNC: 4.8 G/DL
ALP BLD-CCNC: 103 U/L
ALT SERPL-CCNC: 19 U/L
ANION GAP SERPL CALC-SCNC: 12 MMOL/L
AST SERPL-CCNC: 21 U/L
BILIRUB SERPL-MCNC: 0.4 MG/DL
BUN SERPL-MCNC: 19 MG/DL
CALCIUM SERPL-MCNC: 9.7 MG/DL
CHLORIDE SERPL-SCNC: 102 MMOL/L
CO2 SERPL-SCNC: 26 MMOL/L
CREAT SERPL-MCNC: 1.03 MG/DL
EGFR: 101 ML/MIN/1.73M2
GLUCOSE SERPL-MCNC: 96 MG/DL
LDH SERPL-CCNC: 170 U/L
MAGNESIUM SERPL-MCNC: 1.9 MG/DL
POTASSIUM SERPL-SCNC: 4.5 MMOL/L
PROT SERPL-MCNC: 7.1 G/DL
SODIUM SERPL-SCNC: 140 MMOL/L
TESTOST SERPL-MCNC: 333 NG/DL

## 2022-09-14 PROCEDURE — 99024 POSTOP FOLLOW-UP VISIT: CPT

## 2022-09-14 NOTE — HISTORY OF PRESENT ILLNESS
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

## 2022-09-20 ENCOUNTER — APPOINTMENT (OUTPATIENT)
Dept: ORTHOPEDIC SURGERY | Facility: CLINIC | Age: 27
End: 2022-09-20

## 2022-10-27 ENCOUNTER — APPOINTMENT (OUTPATIENT)
Dept: ORTHOPEDIC SURGERY | Facility: CLINIC | Age: 27
End: 2022-10-27

## 2022-10-27 ENCOUNTER — NON-APPOINTMENT (OUTPATIENT)
Age: 27
End: 2022-10-27

## 2022-10-27 VITALS — BODY MASS INDEX: 27.2 KG/M2 | WEIGHT: 190 LBS | HEIGHT: 70 IN

## 2022-10-27 DIAGNOSIS — S86.819A STRAIN OF OTHER MUSCLE(S) AND TENDON(S) AT LOWER LEG LEVEL, UNSPECIFIED LEG, INITIAL ENCOUNTER: ICD-10-CM

## 2022-10-27 PROCEDURE — 99213 OFFICE O/P EST LOW 20 MIN: CPT

## 2022-10-27 NOTE — HISTORY OF PRESENT ILLNESS
[de-identified] : Patient is here 3 and half months from a patella tendon repair is got full extension excellent flexion no effusion minimal thigh atrophy and lumbar get back to full duty will follow-up in 3 months prescription was therapy was given as well will DC his brace

## 2022-11-14 ENCOUNTER — APPOINTMENT (OUTPATIENT)
Dept: ORTHOPEDIC SURGERY | Facility: CLINIC | Age: 27
End: 2022-11-14

## 2023-01-25 ENCOUNTER — OUTPATIENT (OUTPATIENT)
Dept: OUTPATIENT SERVICES | Facility: HOSPITAL | Age: 28
LOS: 1 days | Discharge: HOME | End: 2023-01-25

## 2023-01-25 DIAGNOSIS — Z90.79 ACQUIRED ABSENCE OF OTHER GENITAL ORGAN(S): Chronic | ICD-10-CM

## 2023-01-25 DIAGNOSIS — C62.90 MALIGNANT NEOPLASM OF UNSPECIFIED TESTIS, UNSPECIFIED WHETHER DESCENDED OR UNDESCENDED: ICD-10-CM

## 2023-01-27 PROBLEM — G62.0 CHEMOTHERAPY-INDUCED PERIPHERAL NEUROPATHY: Status: ACTIVE | Noted: 2021-10-25

## 2023-01-30 ENCOUNTER — LABORATORY RESULT (OUTPATIENT)
Age: 28
End: 2023-01-30

## 2023-01-31 ENCOUNTER — APPOINTMENT (OUTPATIENT)
Dept: HEMATOLOGY ONCOLOGY | Facility: CLINIC | Age: 28
End: 2023-01-31
Payer: COMMERCIAL

## 2023-01-31 DIAGNOSIS — G62.0 DRUG-INDUCED POLYNEUROPATHY: ICD-10-CM

## 2023-01-31 DIAGNOSIS — T45.1X5A DRUG-INDUCED POLYNEUROPATHY: ICD-10-CM

## 2023-01-31 LAB
AFP-TM SERPL-MCNC: <1.8 NG/ML
ALBUMIN SERPL ELPH-MCNC: 4.5 G/DL
ALP BLD-CCNC: 103 U/L
ALT SERPL-CCNC: 25 U/L
ANION GAP SERPL CALC-SCNC: 9 MMOL/L
AST SERPL-CCNC: 28 U/L
BASOPHILS # BLD AUTO: 0.15 K/UL
BASOPHILS NFR BLD AUTO: 2.6 %
BILIRUB SERPL-MCNC: 0.4 MG/DL
BUN SERPL-MCNC: 13 MG/DL
CALCIUM SERPL-MCNC: 9.4 MG/DL
CHLORIDE SERPL-SCNC: 104 MMOL/L
CO2 SERPL-SCNC: 28 MMOL/L
CREAT SERPL-MCNC: 1 MG/DL
EGFR: 105 ML/MIN/1.73M2
EOSINOPHIL # BLD AUTO: 0.2 K/UL
EOSINOPHIL NFR BLD AUTO: 3.5 %
GLUCOSE SERPL-MCNC: 93 MG/DL
HCG-TM SERPL-MCNC: <1 MIU/ML
HCT VFR BLD CALC: 46 %
HGB BLD-MCNC: 15 G/DL
LDH SERPL-CCNC: 188 U/L
LYMPHOCYTES # BLD AUTO: 1.5 K/UL
LYMPHOCYTES NFR BLD AUTO: 26.1 %
MAN DIFF?: NORMAL
MCHC RBC-ENTMCNC: 30.2 PG
MCHC RBC-ENTMCNC: 32.6 GM/DL
MCV RBC AUTO: 92.7 FL
MONOCYTES # BLD AUTO: 1 K/UL
MONOCYTES NFR BLD AUTO: 17.4 %
NEUTROPHILS # BLD AUTO: 2.89 K/UL
NEUTROPHILS NFR BLD AUTO: 50.4 %
PLATELET # BLD AUTO: 186 K/UL
POTASSIUM SERPL-SCNC: 4.6 MMOL/L
PROT SERPL-MCNC: 6.6 G/DL
RBC # BLD: 4.96 M/UL
RBC # FLD: 13.1 %
SODIUM SERPL-SCNC: 141 MMOL/L
TESTOST SERPL-MCNC: 440 NG/DL
WBC # FLD AUTO: 5.74 K/UL

## 2023-01-31 PROCEDURE — 99213 OFFICE O/P EST LOW 20 MIN: CPT | Mod: 95

## 2023-01-31 NOTE — REVIEW OF SYSTEMS
[Negative] : Genitourinary [Fever] : no fever [Chills] : no chills [Fatigue] : no fatigue [Recent Change In Weight] : ~T no recent weight change [Chest Pain] : no chest pain [Palpitations] : no palpitations [Lower Ext Edema] : no lower extremity edema [Wheezing] : no wheezing [Cough] : no cough [SOB on Exertion] : no shortness of breath during exertion [Joint Pain] : no joint pain [Joint Stiffness] : no joint stiffness [Muscle Pain] : no muscle pain [Skin Rash] : no skin rash [Dizziness] : no dizziness [Anxiety] : no anxiety [Depression] : no depression [Muscle Weakness] : no muscle weakness [Easy Bleeding] : no tendency for easy bleeding [Easy Bruising] : no tendency for easy bruising [de-identified] : No HA, no paresthesias

## 2023-01-31 NOTE — HISTORY OF PRESENT ILLNESS
[Disease: _____________________] : Disease: [unfilled] [T: ___] : T[unfilled] [N: ___] : N[unfilled] [M: ___] : M[unfilled] [AJCC Stage: ____] : AJCC Stage: [unfilled] [Home] : at home, [unfilled] , at the time of the visit. [Medical Office: ([facility])___] : at the medical office located in  [Verbal consent obtained from patient] : the patient, [unfilled] [de-identified] : Mr. 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 Borrego), no adjuvant therapy given. He was non-compliant with surveillance. He presents to clinic today with large periaortic lymphadenopathy concerning for seminoma recurrent seminoma (3 years out). Here  today to discuss about further management.\par \par ONC Hx:\par Mr. Noyola was diagnosed with Stage IB (pT2pNx) Seminoma in 2018 and underwent Left inguinal radical orchiectomy with Dr. Borrego on 7/6/2018 at [facility]. He presented to the ED at [facility] on 7/9/21 with Left flank pain x 1 week associated with nausea and vomiting. He had CT AP performed on 7/9/21, results showing two enlarged left periaortic soft tissue lymph nodes measuring up to 4.1 x 2.9 x 2.8 cm surrounding the proximal ROSSANA. 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 \par \par 6/28/2018 MRI Visceral Pelvis\par Enlargement and replacement of the left testis by an enhancing mass 5.8x4.0x3.2cm, consistent with a testicular malignancy.  \par \par 7/3/2018 CT AP [facility]\par 1.Splenic hypodensity. Etiology uncertain. \par 2.There are 2 midly enlarged upper mesenteric lymph nodes. Significance uncertain.\par 3.Evidence of chronic pyelonephritis left kidney. Right renal cyst.\par 4.Enlarged and midly fatty liver\par 5.No significant adenopathy in the para-aortic region particularly in the region of the renal lolita. No inguinla lymph nodes\par \par 7/6/2018 Left inguinal radical orchiectomy. meidum size Coloplast 16ml testicular implant placement and plastic closure of inguinial incision with Tenzin Escobar at [facility]\par Pathology report\par -Left testicle and spermatic cord: Seminoma, 6.5cm\par -Specimen laterality : Left\par -Tumor focality:Multifocal\par -Tumor size\par Greatest dimension of main tumor mass : [id number]\par Greatest dimensions of additional tumor nodules: 1.1cm & 1cm\par -Intratubular Germ Cell Neoplasia : Intratubular seminoma \par -Seminoma :Seminoma \par -Timor extension\par Tumor invades rete testis\par Tumor invades into tunica albuginea very close to tunica vaginalis, involvement of vaginalis cannot be excluded\par -Spermatic cord margin : Uninvolved by tumor\par -Lymphovascular invasion : Present\par -Regional Lymph nodes : no lymph nodes submitted or found\par -Pathologic stage : pT2pNx\par \par 7/9/21 CT AP at [facility]\par Two enlarged left periaortic soft tissue lymph nodes measuring up to 4.1 x 2.9 x 2.8 cm surrounding the proximal ROSSANA. Findings concerning for metastatic lymphadenopathy.\par Nonspecific 1.6 cm splenic hypodensity.\par Left renal mid pole 3.5 cm cyst contains enhancing septations. Consider further evaluation with MRI as clinically warranted.\par \par 7/30/2021 CT CHEST:\par 1.  No thoracic adenopathy or metastases.\par 2.  Unchanged splenic hypodensity, not FDG avid on concurrent PET/CT, indeterminate.\par 3.  Incidental probable few left renal calyceal diverticuli. Urogram could be helpful for confirmation.\par \par 7/30/2021 PET/CT:\par - Two enlarged FDG avid para-aortic lymph nodes are suspicious for shane metastasis.\par - No additional sites of pathologic FDG uptake to suggest biologic tumor activity.\par \par Had an episode of food poisoning and went to the ER, had a CT scan, revealed RP LAD. No back pains, no cough no FELIX, no fatigue. Libido is normal, Erections are full. APpetite is good, had some intentional weight loss since February, dropped 25 pounds. Gained about 10 pounds back recently out of anxiety. Goes to the gym daily. 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 \par \par 12/9/21...Completed 3 cycles of BEP at [facility] for recurrent seminoma. Ended November 8, 2021. Had CT with CR on 12/6/21. \par Feels he lost muscle and physical endurance and is now improving. he was able to run a mile yesterday. Appetite is very good. gained several pounds. At the gym every day, working out. No cough, no FELIX, no paresthesias, no tinnitus. No dysgeusia. No edema. No chest pain/pressure/palpitations. No headaches, no dizziness. No fatigue. Libido normal, erections full. Not sexually active at this time. \par \par 3/22/22...Seminoma 1B 2018, recurrence 2021 with N2 disease, good risk, had BEP x 3 at [facility]. Completed in November 2021. Feels ""100%". No pains noted. Libido is fine, erections are good. Sexually active, steady partner. He feels he has some Raynaud's in the cold, noted it a few times while playing basketball, fingers turned white. Appetite is good, no weight loss. No cough, no FELIX. No chest pains/pressure. No tinnitus, no paresthesias except for "ever so slight tingling" when it is cold, temporary. NO fatigue. No edema. No headaches, no dizziness. \par \par 6/14/22...Seminoma 1B 2018, recurrence 2021 with N2 disease, good risk, had BEP x 3 at [facility]. Completed in November 2021. Feels well. No pains noted. No cough, no FELIX. NO headaches, no dizziness. No leg edema. NO chest pain/pressure/palpitations. Libido is normal, erections normal. NO fatigue noted. \par \par 9/13/22. July 2021, recurrent seminoma, 3 years after orchiectomy, now 1 year post relapse and of chemo. BEP x 3 was administered at [facility]>  .had a patellar tendon rupture repair in July. Had a partial tear in the past, was playing basketful and was struck on the knee. overall, feels great. Out of work due to the patellar injury, no PT,returning to work next week. Appetite is good, no weight loss. No cough, no FELIX. NO back/flank pain. No F/C. NO edema of the ankles. Libido is fine, erections are full. Self exam done, no issue noted. he started chemo in September 2021. [de-identified] : 1/31/23.... July 2021, recurrent seminoma, 3 years after orchiectomy, now 1.5 years post relapse and of chemo. BEP x 3 was administered at [facility]>  \par Feels well overall. No pains, no flank pains. NO fatigue noted. Working FT. No headaches, no dizziness, no paresthesias. NO tinnitus. No cough, no FELIX. NO edema. No N/V/D/C. No urinary issues. Does self exam, no issues identified. Libido is "great", erections are fine. No recent illness, no fevers, no chills.

## 2023-01-31 NOTE — ASSESSMENT
[Palliative Care Plan] : not applicable at this time [FreeTextEntry1] : Hortencia Noyola is seen via telehealth services today.  He was diagnosed with recurrent seminoma in July 2021, 3 years after his orchiectomy and he is now 1-1/2 years after his relapse and somewhat more than 1 year after his chemotherapy.  He had 3 cycles of BEP administered at [facility].  He feels well overall.  He reports no pains.  There is specifically no flank pains.  He does not have any fatigue.  He works full-time as a urology PA.  There were no headaches dizziness or paresthesias.  There is no tinnitus.  He has no cough or shortness of breath.  There is no swelling of his legs.  There is no GI complaint.  There were no urinary issues.  He performs self-examination.  He has not identified any abnormalities in his remaining testicle.  Libido is stated to be "great".  Erections are normal.  There is no recent illness.  There were no fevers or chills.\par \par On physical examination, he appears well and in no acute distress.  His performance status is 0.\par \par Laboratory values from yesterday revealed an undetectable beta-hCG.  The testosterone level was 440.  The alpha-fetoprotein was undetectable.  The chemistry panel was normal.  The LDH was 188.  The CBC was normal.\par \par I reviewed the NCCN guidelines with him.  He is due for a CT scan in June and I placed the order for that to be performed.  He also needs to have a chest x-ray at that time.  His next office visit will be in 6 months, and I have asked our scheduling people to set him up for a telehealth visit at that time.\par \par All questions were answered to the best of my ability and to his apparent satisfaction.  I will see him once again in 6 months time.  He was encouraged to contact me if there is any concerns prior to that date.

## 2023-02-01 ENCOUNTER — APPOINTMENT (OUTPATIENT)
Dept: ORTHOPEDIC SURGERY | Facility: CLINIC | Age: 28
End: 2023-02-01
Payer: COMMERCIAL

## 2023-02-01 VITALS — WEIGHT: 195 LBS | BODY MASS INDEX: 27.92 KG/M2 | HEIGHT: 70 IN

## 2023-02-01 DIAGNOSIS — S86.812D STRAIN OF OTHER MUSCLE(S) AND TENDON(S) AT LOWER LEG LEVEL, LEFT LEG, SUBSEQUENT ENCOUNTER: ICD-10-CM

## 2023-02-01 PROCEDURE — 99214 OFFICE O/P EST MOD 30 MIN: CPT

## 2023-02-01 NOTE — HISTORY OF PRESENT ILLNESS
[de-identified] : Patient is here about 6 months from a LT knee patella tendon repair. He is still doing physical therapy. States he is having no pain and good ROM. Patient states he is at about 75% improvement since onset of injury.\par \par He has no effusion is able to straight leg raise full extension is got the better flexion of his injured leg with than the opposite leg but does have noticeable thigh atrophy\par \par Recommend just strengthening quads and hamstrings open closed-chain he can do some light jogging straight ahead but no cutting maneuvers no agility drills will see him back in about 3 or 4 months he had surgery in July for a  patella tendon repair\par \par

## 2023-04-10 NOTE — CONSULT NOTE ADULT - SUBJECTIVE AND OBJECTIVE BOX
Anesthesia Evaluation     Patient summary reviewed and Nursing notes reviewed   no history of anesthetic complications:  NPO Solid Status: > 8 hours  NPO Liquid Status: > 2 hours           Airway   Mallampati: II  TM distance: >3 FB  Neck ROM: full  No difficulty expected  Dental      Pulmonary - normal exam    breath sounds clear to auscultation  (+) a smoker Current,   Cardiovascular - normal exam  Exercise tolerance: good (4-7 METS)    Rhythm: regular  Rate: normal    (+) hypertension, past MI , CAD, cardiac stents hyperlipidemia,     ROS comment: Off plavix 7 days    Neuro/Psych  (+) CVA,    GI/Hepatic/Renal/Endo - negative ROS     Musculoskeletal     Abdominal    Substance History      OB/GYN          Other        ROS/Med Hx Other: PAT Nursing Notes unavailable.                    Anesthesia Plan    ASA 3     general   total IV anesthesia    Anesthetic plan, risks, benefits, and alternatives have been provided, discussed and informed consent has been obtained with: patient.        CODE STATUS:       
ORTHOPAEDIC SURGERY CONSULT NOTE    Reason for Consult: left patellar tendon rupture    HPI: 26yMale presents with pain in the left knee. Patient reports he was playing basketball earlier this evening when he had knee-to-knee contact with another player and injured he knee. After the injury was able to bear weight, but says it seemed like his patellar was high-riding. Also notes difficulty moving the knee. Patient denies head trauma or LOC. Denies pain elsewhere. Denies paresthesias.    Of note patient does report he had an MRI for left knee pain about 5 years ago, and was told he had "calcifications in the tendon" and that he likely injured the tendon / patella in the past. He says he has played basketball a lot over the years but no known history of acute injury to the left knee requiring medical attention.    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 AAOx3.  Resp: NLB on RA.    LLE:   No open skin or wounds  Mild effusion to the left knee  Palpable defect in patellar tendon at inferior pole  Firm palpable mass 1.5 cm inferior to patella, likely correlates with large intra-substance calcifications of the patellar tendon  NTTP at the tibial tuberosity  TTP just at inferior patellar pole  ROM of the knee limited by pain  Unable to SLR  SILT DP/SP/T/Diaz/Sa.   EHL/FHL/TA/Gs motor intact.  2+ DP/PT pulses with brisk cap refill distally.  Compartments soft and compressible. No pain on passive stretch.      Imaging:  XR:   L patella vipin  Large calcifications in mid-substance of the patellar tendon  Inferior patellar enthesophyte noted    A/P: 26y Male with a left patellar tendon rupture after an injury while playing basketball    patient has large intra-substance calcifications in the mid-substance of the patellar tendon likely from prior injury, which are palpable on exam    - Pain control  - Discussed with patient that he will likely require surgical repair of his patellar tendon, and may require MRI for further surgical planning on an outpatient basis  - WBAT LLE in knee immobilizer  - Extremity icing/elevation  - Patient instructed to return to ED if any worsening pain, numbness, tingling, fevers, chills or any other concerning symptoms  - F/U with Dr. Oliver in 1 week. Please call [phone].

## 2023-06-12 ENCOUNTER — APPOINTMENT (OUTPATIENT)
Dept: ORTHOPEDIC SURGERY | Facility: CLINIC | Age: 28
End: 2023-06-12

## 2023-07-19 PROBLEM — C62.90 SEMINOMA: Status: ACTIVE | Noted: 2021-07-26

## 2023-07-19 PROBLEM — Z92.21 HISTORY OF ANTINEOPLASTIC CHEMOTHERAPY: Status: RESOLVED | Noted: 2023-07-19 | Resolved: 2023-07-19

## 2023-07-19 NOTE — HISTORY OF PRESENT ILLNESS
[Disease: _____________________] : Disease: [unfilled] [T: ___] : T[unfilled] [N: ___] : N[unfilled] [M: ___] : M[unfilled] [AJCC Stage: ____] : AJCC Stage: [unfilled] [de-identified] : Mr. 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 Borrego), no adjuvant therapy given. He was non-compliant with surveillance. He presents to clinic today with large periaortic lymphadenopathy concerning for seminoma recurrent seminoma (3 years out). Here  today to discuss about further management.\par \par ONC Hx:\par Mr. Noyola was diagnosed with Stage IB (pT2pNx) Seminoma in 2018 and underwent Left inguinal radical orchiectomy with Dr. Borrego on 7/6/2018 at [facility]. He presented to the ED at [facility] on 7/9/21 with Left flank pain x 1 week associated with nausea and vomiting. He had CT AP performed on 7/9/21, results showing two enlarged left periaortic soft tissue lymph nodes measuring up to 4.1 x 2.9 x 2.8 cm surrounding the proximal ROSSANA. 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 \par \par 6/28/2018 MRI Visceral Pelvis\par Enlargement and replacement of the left testis by an enhancing mass 5.8x4.0x3.2cm, consistent with a testicular malignancy.  \par \par 7/3/2018 CT AP at [facility]\par 1.Splenic hypodensity. Etiology uncertain. \par 2.There are 2 midly enlarged upper mesenteric lymph nodes. Significance uncertain.\par 3.Evidence of chronic pyelonephritis left kidney. Right renal cyst.\par 4.Enlarged and midly fatty liver\par 5.No significant adenopathy in the para-aortic region particularly in the region of the renal lolita. No inguinla lymph nodes\par \par 7/6/2018 Left inguinal radical orchiectomy. meidum size Coloplast 16ml testicular implant placement and plastic closure of inguinial incision with Tenzin Escobar at [facility]\par Pathology report\par -Left testicle and spermatic cord: Seminoma, 6.5cm\par -Specimen laterality : Left\par -Tumor focality:Multifocal\par -Tumor size\par Greatest dimension of main tumor mass : [id number]\par Greatest dimensions of additional tumor nodules: 1.1cm & 1cm\par -Intratubular Germ Cell Neoplasia : Intratubular seminoma \par -Seminoma :Seminoma \par -Timor extension\par Tumor invades rete testis\par Tumor invades into tunica albuginea very close to tunica vaginalis, involvement of vaginalis cannot be excluded\par -Spermatic cord margin : Uninvolved by tumor\par -Lymphovascular invasion : Present\par -Regional Lymph nodes : no lymph nodes submitted or found\par -Pathologic stage : pT2pNx\par \par 7/9/21 CT AP at [facility]\par Two enlarged left periaortic soft tissue lymph nodes measuring up to 4.1 x 2.9 x 2.8 cm surrounding the proximal ROSSANA. Findings concerning for metastatic lymphadenopathy.\par Nonspecific 1.6 cm splenic hypodensity.\par Left renal mid pole 3.5 cm cyst contains enhancing septations. Consider further evaluation with MRI as clinically warranted.\par \par 7/30/2021 CT CHEST:\par 1.  No thoracic adenopathy or metastases.\par 2.  Unchanged splenic hypodensity, not FDG avid on concurrent PET/CT, indeterminate.\par 3.  Incidental probable few left renal calyceal diverticuli. Urogram could be helpful for confirmation.\par \par 7/30/2021 PET/CT:\par - Two enlarged FDG avid para-aortic lymph nodes are suspicious for shane metastasis.\par - No additional sites of pathologic FDG uptake to suggest biologic tumor activity.\par \par Had an episode of food poisoning and went to the ER, had a CT scan, revealed RP LAD. No back pains, no cough no FELIX, no fatigue. Libido is normal, Erections are full. APpetite is good, had some intentional weight loss since February, dropped 25 pounds. Gained about 10 pounds back recently out of anxiety. Goes to the gym daily. 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 \par \par 12/9/21...Completed 3 cycles of BEP at [facility] for recurrent seminoma. Ended November 8, 2021. Had CT with CR on 12/6/21. \par Feels he lost muscle and physical endurance and is now improving. he was able to run a mile yesterday. Appetite is very good. gained several pounds. At the gym every day, working out. No cough, no FELIX, no paresthesias, no tinnitus. No dysgeusia. No edema. No chest pain/pressure/palpitations. No headaches, no dizziness. No fatigue. Libido normal, erections full. Not sexually active at this time. \par \par 3/22/22...Seminoma 1B 2018, recurrence 2021 with N2 disease, good risk, had BEP x 3 at [facility]. Completed in November 2021. Feels ""100%". No pains noted. Libido is fine, erections are good. Sexually active, steady partner. He feels he has some Raynaud's in the cold, noted it a few times while playing basketball, fingers turned white. Appetite is good, no weight loss. No cough, no FELIX. No chest pains/pressure. No tinnitus, no paresthesias except for "ever so slight tingling" when it is cold, temporary. NO fatigue. No edema. No headaches, no dizziness. \par \par 6/14/22...Seminoma 1B 2018, recurrence 2021 with N2 disease, good risk, had BEP x 3 at [facility]. Completed in November 2021. Feels well. No pains noted. No cough, no FELIX. NO headaches, no dizziness. No leg edema. NO chest pain/pressure/palpitations. Libido is normal, erections normal. NO fatigue noted. \par \par 9/13/22. July 2021, recurrent seminoma, 3 years after orchiectomy, now 1 year post relapse and of chemo. BEP x 3 was administered at [facility]>  .had a patellar tendon rupture repair in July. Had a partial tear in the past, was playing basketful and was struck on the knee. overall, feels great. Out of work due to the patellar injury, no PT,returning to work next week. Appetite is good, no weight loss. No cough, no FELIX. NO back/flank pain. No F/C. NO edema of the ankles. Libido is fine, erections are full. Self exam done, no issue noted. he started chemo in September 2021.\par \par 1/31/23.... July 2021, recurrent seminoma, 3 years after orchiectomy, now 1.5 years post relapse and of chemo. BEP x 3 was administered at [facility]>  \par Feels well overall. No pains, no flank pains. NO fatigue noted. Working FT. No headaches, no dizziness, no paresthesias. NO tinnitus. No cough, no FELIX. NO edema. No N/V/D/C. No urinary issues. Does self exam, no issues identified. Libido is "great", erections are fine. No recent illness, no fevers, no chills.  [de-identified] : 7/25/23.... July 2021, recurrent seminoma, 3 years after orchiectomy, now 2 years post relapse and of chemo. BEP x 3 was administered at [facility]>  \par

## 2023-07-19 NOTE — ASSESSMENT
[Palliative Care Plan] : not applicable at this time [FreeTextEntry1] : Hortencia Noyola is seen via telehealth services today.  He was diagnosed with recurrent seminoma in July 2021, 3 years after his orchiectomy and he is now 1-1/2 years after his relapse and somewhat more than 1 year after his chemotherapy.  He had 3 cycles of BEP administered at [facility].  He feels well overall.  He reports no pains.  There is specifically no flank pains.  He does not have any fatigue.  He works full-time as a urology PA.  There were no headaches dizziness or paresthesias.  There is no tinnitus.  He has no cough or shortness of breath.  There is no swelling of his legs.  There is no GI complaint.  There were no urinary issues.  He performs self-examination.  He has not identified any abnormalities in his remaining testicle.  Libido is stated to be "great".  Erections are normal.  There is no recent illness.  There were no fevers or chills.\par \par On physical examination, he appears well and in no acute distress.  His performance status is 0.\par \par Laboratory values from yesterday revealed an undetectable beta-hCG.  The testosterone level was 440.  The alpha-fetoprotein was undetectable.  The chemistry panel was normal.  The LDH was 188.  The CBC was normal.\par \par I reviewed the NCCN guidelines with him.  He is due for a CT scan in June and I placed the order for that to be performed.  He also needs to have a chest x-ray at that time.  His next office visit will be in 6 months, and I have asked our scheduling people to set him up for a telehealth visit at that time.\par \par All questions were answered to the best of my ability and to his apparent satisfaction.  I will see him once again in 6 months time.  He was encouraged to contact me if there is any concerns prior to that date.

## 2023-07-23 ENCOUNTER — OUTPATIENT (OUTPATIENT)
Dept: OUTPATIENT SERVICES | Facility: HOSPITAL | Age: 28
LOS: 1 days | Discharge: HOME | End: 2023-07-23

## 2023-07-23 DIAGNOSIS — Z90.79 ACQUIRED ABSENCE OF OTHER GENITAL ORGAN(S): Chronic | ICD-10-CM

## 2023-07-23 DIAGNOSIS — C62.90 MALIGNANT NEOPLASM OF UNSPECIFIED TESTIS, UNSPECIFIED WHETHER DESCENDED OR UNDESCENDED: ICD-10-CM

## 2023-07-25 ENCOUNTER — APPOINTMENT (OUTPATIENT)
Dept: HEMATOLOGY ONCOLOGY | Facility: CLINIC | Age: 28
End: 2023-07-25

## 2023-07-25 DIAGNOSIS — Z92.21 PERSONAL HISTORY OF ANTINEOPLASTIC CHEMOTHERAPY: ICD-10-CM

## 2023-07-25 DIAGNOSIS — C62.90 MALIGNANT NEOPLASM OF UNSPECIFIED TESTIS, UNSPECIFIED WHETHER DESCENDED OR UNDESCENDED: ICD-10-CM

## 2023-08-01 RX ORDER — PANTOPRAZOLE SODIUM 20 MG/1
1 TABLET, DELAYED RELEASE ORAL
Qty: 30 | Refills: 0
Start: 2023-08-01 | End: 2023-08-30

## 2023-08-02 ENCOUNTER — OUTPATIENT (OUTPATIENT)
Dept: OUTPATIENT SERVICES | Facility: HOSPITAL | Age: 28
LOS: 1 days | End: 2023-08-02
Payer: COMMERCIAL

## 2023-08-02 DIAGNOSIS — C62.90 MALIGNANT NEOPLASM OF UNSPECIFIED TESTIS, UNSPECIFIED WHETHER DESCENDED OR UNDESCENDED: ICD-10-CM

## 2023-08-02 DIAGNOSIS — Z90.79 ACQUIRED ABSENCE OF OTHER GENITAL ORGAN(S): Chronic | ICD-10-CM

## 2023-08-02 PROCEDURE — 71046 X-RAY EXAM CHEST 2 VIEWS: CPT

## 2023-08-02 PROCEDURE — 71046 X-RAY EXAM CHEST 2 VIEWS: CPT | Mod: 26

## 2023-08-02 PROCEDURE — 74177 CT ABD & PELVIS W/CONTRAST: CPT

## 2023-08-02 PROCEDURE — 74177 CT ABD & PELVIS W/CONTRAST: CPT | Mod: 26

## 2023-08-03 DIAGNOSIS — C62.90 MALIGNANT NEOPLASM OF UNSPECIFIED TESTIS, UNSPECIFIED WHETHER DESCENDED OR UNDESCENDED: ICD-10-CM

## 2023-11-12 NOTE — ASU PATIENT PROFILE, ADULT - PRESSURE ULCER(S)
no CONSTITUTIONAL: Well-developed; well-nourished; in no acute distress, nontoxic appearing  SKIN: skin exam is warm and dry  ENT: MMM, +erythematous oropharynx, uvula midline, tolerating secretions, no muffled voice, no exudates   CARD: S1, S2 normal, no murmur  RESP: No wheezes, rales or rhonchi. Good air movement bilaterally  ABD: soft; non-distended; non-tender.    EXT: Normal ROM.    NEURO: awake, alert, following commands, oriented, grossly unremarkable. No Focal deficits. GCS 15.   PSYCH: Cooperative, appropriate. none

## 2024-08-07 ENCOUNTER — OUTPATIENT (OUTPATIENT)
Dept: OUTPATIENT SERVICES | Facility: HOSPITAL | Age: 29
LOS: 1 days | End: 2024-08-07
Payer: COMMERCIAL

## 2024-08-07 ENCOUNTER — LABORATORY RESULT (OUTPATIENT)
Age: 29
End: 2024-08-07

## 2024-08-07 ENCOUNTER — APPOINTMENT (OUTPATIENT)
Age: 29
End: 2024-08-07

## 2024-08-07 DIAGNOSIS — C62.90 MALIGNANT NEOPLASM OF UNSPECIFIED TESTIS, UNSPECIFIED WHETHER DESCENDED OR UNDESCENDED: ICD-10-CM

## 2024-08-07 DIAGNOSIS — Z90.79 ACQUIRED ABSENCE OF OTHER GENITAL ORGAN(S): Chronic | ICD-10-CM

## 2024-08-07 PROCEDURE — 80053 COMPREHEN METABOLIC PANEL: CPT

## 2024-08-07 PROCEDURE — ZZZZZ: CPT

## 2024-08-07 PROCEDURE — 82105 ALPHA-FETOPROTEIN SERUM: CPT

## 2024-08-07 PROCEDURE — 99213 OFFICE O/P EST LOW 20 MIN: CPT

## 2024-08-07 PROCEDURE — 83615 LACTATE (LD) (LDH) ENZYME: CPT

## 2024-08-07 PROCEDURE — 36415 COLL VENOUS BLD VENIPUNCTURE: CPT

## 2024-08-07 PROCEDURE — 84403 ASSAY OF TOTAL TESTOSTERONE: CPT

## 2024-08-07 PROCEDURE — 85027 COMPLETE CBC AUTOMATED: CPT

## 2024-08-07 PROCEDURE — 84704 HCG FREE BETACHAIN TEST: CPT

## 2024-08-07 NOTE — ASSESSMENT
[FreeTextEntry1] : Hortencia Noyola is seen via telehealth services today.  He was diagnosed with recurrent seminoma in July 2021, 3 years after his orchiectomy and he is now 1-1/2 years after his relapse and somewhat more than 1 year after his chemotherapy.  He had 3 cycles of BEP administered at [facility].    He is currently doing well.  I last saw him 1.5 years ago.  He is currently almost 3 years from the end of his chemotherapy.  There is no report of tinnitus or numbness.  He did describe some Raynaud's-like symptoms in the past during cold weather, but this did not happen this past winter.  On physical examination, he appears well. His blood pressure was 122/78.  His performance status is 0.  His oxygen saturation was 99%.  The physical examination is unremarkable.  Today's white blood cell count was 7.1 with a hemoglobin value of 14.6 g.  The platelet count was 201,000.  The chemistry panel is pending as are the tumor markers.  The testosterone level was also pending as well.  We reviewed the NCCN guidelines for stage IIb seminoma.  Visits in years 3 4 and 5 are every 6 months.  A CT scan of the abdomen and pelvis is performed during year 3.  His last CT scan was performed in August 2023, and therefore he is due for imaging at this time.  All questions were answered to the best of my ability and to his apparent satisfaction.  He will be seen once again in 6 months' time.

## 2024-08-07 NOTE — HISTORY OF PRESENT ILLNESS
[de-identified] : Mr. 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 Borrego), no adjuvant therapy given. He was non-compliant with surveillance. He presents to clinic today with large periaortic lymphadenopathy concerning for seminoma recurrent seminoma (3 years out). Here  today to discuss about further management.\par \par ONC Hx:\par Mr. Noyola was diagnosed with Stage IB (pT2pNx) Seminoma in 2018 and underwent Left inguinal radical orchiectomy with Dr. Borrego on 7/6/2018 at [facility]. He presented to the ED at [facility] on 7/9/21 with Left flank pain x 1 week associated with nausea and vomiting. He had CT AP performed on 7/9/21, results showing two enlarged left periaortic soft tissue lymph nodes measuring up to 4.1 x 2.9 x 2.8 cm surrounding the proximal ROSSANA. 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 \par \par 6/28/2018 MRI Visceral Pelvis\par Enlargement and replacement of the left testis by an enhancing mass 5.8x4.0x3.2cm, consistent with a testicular malignancy.  \par \par 7/3/2018 CT AP at [facility]\par 1.Splenic hypodensity. Etiology uncertain. \par 2.There are 2 midly enlarged upper mesenteric lymph nodes. Significance uncertain.\par 3.Evidence of chronic pyelonephritis left kidney. Right renal cyst.\par 4.Enlarged and midly fatty liver\par 5.No significant adenopathy in the para-aortic region particularly in the region of the renal lolita. No inguinla lymph nodes\par \par 7/6/2018 Left inguinal radical orchiectomy. meidum size Coloplast 16ml testicular implant placement and plastic closure of inguinial incision with Tenzin Escobar at [facility]\par Pathology report\par -Left testicle and spermatic cord: Seminoma, 6.5cm\par -Specimen laterality : Left\par -Tumor focality:Multifocal\par -Tumor size\par Greatest dimension of main tumor mass : [id number]\par Greatest dimensions of additional tumor nodules: 1.1cm & 1cm\par -Intratubular Germ Cell Neoplasia : Intratubular seminoma \par -Seminoma :Seminoma \par -Timor extension\par Tumor invades rete testis\par Tumor invades into tunica albuginea very close to tunica vaginalis, involvement of vaginalis cannot be excluded\par -Spermatic cord margin : Uninvolved by tumor\par -Lymphovascular invasion : Present\par -Regional Lymph nodes : no lymph nodes submitted or found\par -Pathologic stage : pT2pNx\par \par 7/9/21 CT AP at [facility]\par Two enlarged left periaortic soft tissue lymph nodes measuring up to 4.1 x 2.9 x 2.8 cm surrounding the proximal ROSSANA. Findings concerning for metastatic lymphadenopathy.\par Nonspecific 1.6 cm splenic hypodensity.\par Left renal mid pole 3.5 cm cyst contains enhancing septations. Consider further evaluation with MRI as clinically warranted.\par \par 7/30/2021 CT CHEST:\par 1.  No thoracic adenopathy or metastases.\par 2.  Unchanged splenic hypodensity, not FDG avid on concurrent PET/CT, indeterminate.\par 3.  Incidental probable few left renal calyceal diverticuli. Urogram could be helpful for confirmation.\par \par 7/30/2021 PET/CT:\par - Two enlarged FDG avid para-aortic lymph nodes are suspicious for shane metastasis.\par - No additional sites of pathologic FDG uptake to suggest biologic tumor activity.\par \par Had an episode of food poisoning and went to the ER, had a CT scan, revealed RP LAD. No back pains, no cough no FELIX, no fatigue. Libido is normal, Erections are full. APpetite is good, had some intentional weight loss since February, dropped 25 pounds. Gained about 10 pounds back recently out of anxiety. Goes to the gym daily. 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 \par \par 12/9/21...Completed 3 cycles of BEP at [facility] for recurrent seminoma. Ended November 8, 2021. Had CT with CR on 12/6/21. \par Feels he lost muscle and physical endurance and is now improving. he was able to run a mile yesterday. Appetite is very good. gained several pounds. At the gym every day, working out. No cough, no FELIX, no paresthesias, no tinnitus. No dysgeusia. No edema. No chest pain/pressure/palpitations. No headaches, no dizziness. No fatigue. Libido normal, erections full. Not sexually active at this time. \par \par 3/22/22...Seminoma 1B 2018, recurrence 2021 with N2 disease, good risk, had BEP x 3 at [facility]. Completed in November 2021. Feels ""100%". No pains noted. Libido is fine, erections are good. Sexually active, steady partner. He feels he has some Raynaud's in the cold, noted it a few times while playing basketball, fingers turned white. Appetite is good, no weight loss. No cough, no FELIX. No chest pains/pressure. No tinnitus, no paresthesias except for "ever so slight tingling" when it is cold, temporary. NO fatigue. No edema. No headaches, no dizziness. \par \par 6/14/22...Seminoma 1B 2018, recurrence 2021 with N2 disease, good risk, had BEP x 3 at [facility]. Completed in November 2021. Feels well. No pains noted. No cough, no FELIX. NO headaches, no dizziness. No leg edema. NO chest pain/pressure/palpitations. Libido is normal, erections normal. NO fatigue noted. \par \par 9/13/22. July 2021, recurrent seminoma, 3 years after orchiectomy, now 1 year post relapse and of chemo. BEP x 3 was administered at [facility]>  .had a patellar tendon rupture repair in July. Had a partial tear in the past, was playing basketful and was struck on the knee. overall, feels great. Out of work due to the patellar injury, no PT,returning to work next week. Appetite is good, no weight loss. No cough, no FELIX. NO back/flank pain. No F/C. NO edema of the ankles. Libido is fine, erections are full. Self exam done, no issue noted. he started chemo in September 2021.\par \par 1/31/23.... July 2021, recurrent seminoma, 3 years after orchiectomy, now 1.5 years post relapse and of chemo. BEP x 3 was administered at [facility]>  \par Feels well overall. No pains, no flank pains. NO fatigue noted. Working FT. No headaches, no dizziness, no paresthesias. NO tinnitus. No cough, no FELIX. NO edema. No N/V/D/C. No urinary issues. Does self exam, no issues identified. Libido is "great", erections are fine. No recent illness, no fevers, no chills.  [de-identified] : initially IB, relapsed [de-identified] : 8/7/24...missed telehealth on 7/25/23.... July 2021, recurrent seminoma, now 5 years after orchiectomy.  Had RP relapse and chemo with BEP x 3 at [facility], treatment completed in November 2021.  He previously complained of neuropathy and Raynaud's-like phenomena during cold weather, but he did not note this to be the case this past winter.  He does not have any tinnitus.  He denies any fatigue.  His appetite is good and there were no GI or respiratory complaints.  His weight is stable.  He does not have any headaches.  There is no edema of the extremities.  Libido is normal and erections are normal.

## 2024-08-07 NOTE — HISTORY OF PRESENT ILLNESS
[de-identified] : Mr. 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 Borrego), no adjuvant therapy given. He was non-compliant with surveillance. He presents to clinic today with large periaortic lymphadenopathy concerning for seminoma recurrent seminoma (3 years out). Here  today to discuss about further management.\par \par ONC Hx:\par Mr. Noyola was diagnosed with Stage IB (pT2pNx) Seminoma in 2018 and underwent Left inguinal radical orchiectomy with Dr. Borrego on 7/6/2018 at [facility]. He presented to the ED at [facility] on 7/9/21 with Left flank pain x 1 week associated with nausea and vomiting. He had CT AP performed on 7/9/21, results showing two enlarged left periaortic soft tissue lymph nodes measuring up to 4.1 x 2.9 x 2.8 cm surrounding the proximal ROSSANA. 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 \par \par 6/28/2018 MRI Visceral Pelvis\par Enlargement and replacement of the left testis by an enhancing mass 5.8x4.0x3.2cm, consistent with a testicular malignancy.  \par \par 7/3/2018 CT AP at [facility]\par 1.Splenic hypodensity. Etiology uncertain. \par 2.There are 2 midly enlarged upper mesenteric lymph nodes. Significance uncertain.\par 3.Evidence of chronic pyelonephritis left kidney. Right renal cyst.\par 4.Enlarged and midly fatty liver\par 5.No significant adenopathy in the para-aortic region particularly in the region of the renal lolita. No inguinla lymph nodes\par \par 7/6/2018 Left inguinal radical orchiectomy. meidum size Coloplast 16ml testicular implant placement and plastic closure of inguinial incision with Tenzin Escobar at [facility]\par Pathology report\par -Left testicle and spermatic cord: Seminoma, 6.5cm\par -Specimen laterality : Left\par -Tumor focality:Multifocal\par -Tumor size\par Greatest dimension of main tumor mass : [id number]\par Greatest dimensions of additional tumor nodules: 1.1cm & 1cm\par -Intratubular Germ Cell Neoplasia : Intratubular seminoma \par -Seminoma :Seminoma \par -Timor extension\par Tumor invades rete testis\par Tumor invades into tunica albuginea very close to tunica vaginalis, involvement of vaginalis cannot be excluded\par -Spermatic cord margin : Uninvolved by tumor\par -Lymphovascular invasion : Present\par -Regional Lymph nodes : no lymph nodes submitted or found\par -Pathologic stage : pT2pNx\par \par 7/9/21 CT AP at [facility]\par Two enlarged left periaortic soft tissue lymph nodes measuring up to 4.1 x 2.9 x 2.8 cm surrounding the proximal ROSSANA. Findings concerning for metastatic lymphadenopathy.\par Nonspecific 1.6 cm splenic hypodensity.\par Left renal mid pole 3.5 cm cyst contains enhancing septations. Consider further evaluation with MRI as clinically warranted.\par \par 7/30/2021 CT CHEST:\par 1.  No thoracic adenopathy or metastases.\par 2.  Unchanged splenic hypodensity, not FDG avid on concurrent PET/CT, indeterminate.\par 3.  Incidental probable few left renal calyceal diverticuli. Urogram could be helpful for confirmation.\par \par 7/30/2021 PET/CT:\par - Two enlarged FDG avid para-aortic lymph nodes are suspicious for shane metastasis.\par - No additional sites of pathologic FDG uptake to suggest biologic tumor activity.\par \par Had an episode of food poisoning and went to the ER, had a CT scan, revealed RP LAD. No back pains, no cough no FELIX, no fatigue. Libido is normal, Erections are full. APpetite is good, had some intentional weight loss since February, dropped 25 pounds. Gained about 10 pounds back recently out of anxiety. Goes to the gym daily. 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 \par \par 12/9/21...Completed 3 cycles of BEP at [facility] for recurrent seminoma. Ended November 8, 2021. Had CT with CR on 12/6/21. \par Feels he lost muscle and physical endurance and is now improving. he was able to run a mile yesterday. Appetite is very good. gained several pounds. At the gym every day, working out. No cough, no FELIX, no paresthesias, no tinnitus. No dysgeusia. No edema. No chest pain/pressure/palpitations. No headaches, no dizziness. No fatigue. Libido normal, erections full. Not sexually active at this time. \par \par 3/22/22...Seminoma 1B 2018, recurrence 2021 with N2 disease, good risk, had BEP x 3 at [facility]. Completed in November 2021. Feels ""100%". No pains noted. Libido is fine, erections are good. Sexually active, steady partner. He feels he has some Raynaud's in the cold, noted it a few times while playing basketball, fingers turned white. Appetite is good, no weight loss. No cough, no FELIX. No chest pains/pressure. No tinnitus, no paresthesias except for "ever so slight tingling" when it is cold, temporary. NO fatigue. No edema. No headaches, no dizziness. \par \par 6/14/22...Seminoma 1B 2018, recurrence 2021 with N2 disease, good risk, had BEP x 3 at [facility]. Completed in November 2021. Feels well. No pains noted. No cough, no FELIX. NO headaches, no dizziness. No leg edema. NO chest pain/pressure/palpitations. Libido is normal, erections normal. NO fatigue noted. \par \par 9/13/22. July 2021, recurrent seminoma, 3 years after orchiectomy, now 1 year post relapse and of chemo. BEP x 3 was administered at [facility]>  .had a patellar tendon rupture repair in July. Had a partial tear in the past, was playing basketful and was struck on the knee. overall, feels great. Out of work due to the patellar injury, no PT,returning to work next week. Appetite is good, no weight loss. No cough, no FELIX. NO back/flank pain. No F/C. NO edema of the ankles. Libido is fine, erections are full. Self exam done, no issue noted. he started chemo in September 2021.\par \par 1/31/23.... July 2021, recurrent seminoma, 3 years after orchiectomy, now 1.5 years post relapse and of chemo. BEP x 3 was administered at [facility]>  \par Feels well overall. No pains, no flank pains. NO fatigue noted. Working FT. No headaches, no dizziness, no paresthesias. NO tinnitus. No cough, no FELIX. NO edema. No N/V/D/C. No urinary issues. Does self exam, no issues identified. Libido is "great", erections are fine. No recent illness, no fevers, no chills.  [de-identified] : initially IB, relapsed [de-identified] : 8/7/24...missed telehealth on 7/25/23.... July 2021, recurrent seminoma, now 5 years after orchiectomy.  Had RP relapse and chemo with BEP x 3 at [facility], treatment completed in November 2021.  He previously complained of neuropathy and Raynaud's-like phenomena during cold weather, but he did not note this to be the case this past winter.  He does not have any tinnitus.  He denies any fatigue.  His appetite is good and there were no GI or respiratory complaints.  His weight is stable.  He does not have any headaches.  There is no edema of the extremities.  Libido is normal and erections are normal.

## 2024-08-07 NOTE — REVIEW OF SYSTEMS
[Fever] : no fever [Chills] : no chills [Fatigue] : no fatigue [Recent Change In Weight] : ~T no recent weight change [Chest Pain] : no chest pain [Lower Ext Edema] : no lower extremity edema [Wheezing] : no wheezing [Cough] : no cough [SOB on Exertion] : no shortness of breath during exertion [Dysuria] : no dysuria [Incontinence] : no incontinence [Joint Pain] : no joint pain [Joint Stiffness] : no joint stiffness [Muscle Pain] : no muscle pain [Skin Rash] : no skin rash [Dizziness] : no dizziness [Difficulty Walking] : no difficulty walking [Anxiety] : no anxiety [Depression] : no depression [Muscle Weakness] : no muscle weakness [Easy Bruising] : no tendency for easy bruising [Negative] : Gastrointestinal [de-identified] : No headaches, no numbness

## 2024-08-07 NOTE — REVIEW OF SYSTEMS
[Fever] : no fever [Chills] : no chills [Fatigue] : no fatigue [Recent Change In Weight] : ~T no recent weight change [Chest Pain] : no chest pain [Lower Ext Edema] : no lower extremity edema [Wheezing] : no wheezing [Cough] : no cough [SOB on Exertion] : no shortness of breath during exertion [Dysuria] : no dysuria [Incontinence] : no incontinence [Joint Pain] : no joint pain [Joint Stiffness] : no joint stiffness [Muscle Pain] : no muscle pain [Dizziness] : no dizziness [Skin Rash] : no skin rash [Difficulty Walking] : no difficulty walking [Anxiety] : no anxiety [Depression] : no depression [Muscle Weakness] : no muscle weakness [Easy Bruising] : no tendency for easy bruising [Negative] : Gastrointestinal [de-identified] : No headaches, no numbness

## 2024-08-07 NOTE — BEGINNING OF VISIT
[0] : 2) Feeling down, depressed, or hopeless: Not at all (0) [PHQ-2 Negative] : PHQ-2 Negative [[id number]] : 0 [Pain Scale: ___] : On a scale of 1-10, today the patient's pain is a(n) [unfilled]. [Future Reassessment of Pain Scale] : Future reassessment of pain scale [Never] : Never

## 2024-08-07 NOTE — ASSESSMENT
[FreeTextEntry1] : Hortencia Noyola is seen via telehealth services today.  He was diagnosed with recurrent seminoma in July 2021, 3 years after his orchiectomy and he is now 1-1/2 years after his relapse and somewhat more than 1 year after his chemotherapy.  He had 3 cycles of BEP administered at [facility].    He is currently doing well.  I last saw him 1.5 years ago.  He is currently almost 3 years from the end of his chemotherapy.  There is no report of tinnitus or numbness.  He did describe some Raynaud's-like symptoms in the past during cold weather, but this did not happen this past winter.  On physical examination, he appears well. His blood pressure was 122/78.  His performance status is 0.  His oxygen saturation was 99%.  The physical examination is unremarkable.  Today's white blood cell count was 7.1 with a hemoglobin value of 14.6 g.  The platelet count was 201,000.  The chemistry panel is pending as are the tumor markers.  The testosterone level was also pending as well.  We reviewed the NCCN guidelines for stage IIb seminoma.  Visits in years 3 4 and 5 are every 6 months.  A CT scan of the abdomen and pelvis is performed during year 3.  His last CT scan was performed in August 2023, and therefore he is due for imaging at this time.  All questions were answered to the best of my ability and to his apparent satisfaction.  He will be seen once again in 6 months' time.

## 2024-08-07 NOTE — BEGINNING OF VISIT
[0] : 2) Feeling down, depressed, or hopeless: Not at all (0) [PHQ-2 Negative] : PHQ-2 Negative [[id number]] : 0 [Pain Scale: ___] : On a scale of 1-10, today the patient's pain is a(n) [unfilled]. [Future Reassessment of Pain Scale] : Future reassessment of pain scale [Never] : Never

## 2024-08-07 NOTE — PHYSICAL EXAM
[Fully active, able to carry on all pre-disease performance without restriction] : Status 0 - Fully active, able to carry on all pre-disease performance without restriction [Normal] : grossly intact [de-identified] : No edema noted [de-identified] : Right testicle normal to palpation.  Left scrotal sac has a prosthesis.

## 2024-08-07 NOTE — PHYSICAL EXAM
[Fully active, able to carry on all pre-disease performance without restriction] : Status 0 - Fully active, able to carry on all pre-disease performance without restriction [Normal] : grossly intact [de-identified] : No edema noted [de-identified] : Right testicle normal to palpation.  Left scrotal sac has a prosthesis.

## 2024-08-08 DIAGNOSIS — C62.90 MALIGNANT NEOPLASM OF UNSPECIFIED TESTIS, UNSPECIFIED WHETHER DESCENDED OR UNDESCENDED: ICD-10-CM

## 2025-06-09 NOTE — PHYSICAL EXAM
Last office visit: 12/3/24    Next office visit: none scheduled    Last refill: 4/4/24       [Fully active, able to carry on all pre-disease performance without restriction] : Status 0 - Fully active, able to carry on all pre-disease performance without restriction [Normal] : well developed, well nourished, in no acute distress